# Patient Record
Sex: FEMALE | Race: WHITE | NOT HISPANIC OR LATINO | Employment: UNEMPLOYED | URBAN - METROPOLITAN AREA
[De-identification: names, ages, dates, MRNs, and addresses within clinical notes are randomized per-mention and may not be internally consistent; named-entity substitution may affect disease eponyms.]

---

## 2017-04-11 ENCOUNTER — LAB CONVERSION - ENCOUNTER (OUTPATIENT)
Dept: OTHER | Facility: OTHER | Age: 57
End: 2017-04-11

## 2017-04-11 LAB
CHOLEST SERPL-MCNC: 133 MG/DL (ref 125–200)
CHOLEST/HDLC SERPL: 3.2 (CALC)
HDLC SERPL-MCNC: 42 MG/DL
LDL CHOLESTEROL (HISTORICAL): 64 MG/DL (CALC)
NON-HDL-CHOL (CHOL-HDL) (HISTORICAL): 91 MG/DL (CALC)
TRIGL SERPL-MCNC: 133 MG/DL

## 2017-04-12 ENCOUNTER — ALLSCRIPTS OFFICE VISIT (OUTPATIENT)
Dept: OTHER | Facility: OTHER | Age: 57
End: 2017-04-12

## 2017-05-23 ENCOUNTER — ALLSCRIPTS OFFICE VISIT (OUTPATIENT)
Dept: OTHER | Facility: OTHER | Age: 57
End: 2017-05-23

## 2017-05-23 DIAGNOSIS — I10 ESSENTIAL (PRIMARY) HYPERTENSION: ICD-10-CM

## 2017-05-23 DIAGNOSIS — E78.5 HYPERLIPIDEMIA: ICD-10-CM

## 2017-05-23 DIAGNOSIS — Z86.79 PERSONAL HISTORY OF OTHER DISEASES OF THE CIRCULATORY SYSTEM: ICD-10-CM

## 2017-06-06 ENCOUNTER — ALLSCRIPTS OFFICE VISIT (OUTPATIENT)
Dept: OTHER | Facility: OTHER | Age: 57
End: 2017-06-06

## 2017-06-06 LAB — S PYO AG THROAT QL: POSITIVE

## 2017-06-10 ENCOUNTER — LAB CONVERSION - ENCOUNTER (OUTPATIENT)
Dept: OTHER | Facility: OTHER | Age: 57
End: 2017-06-10

## 2017-06-10 LAB — CULTURE RESULT (HISTORICAL): NORMAL

## 2017-06-12 ENCOUNTER — GENERIC CONVERSION - ENCOUNTER (OUTPATIENT)
Dept: OTHER | Facility: OTHER | Age: 57
End: 2017-06-12

## 2017-06-13 ENCOUNTER — LAB CONVERSION - ENCOUNTER (OUTPATIENT)
Dept: OTHER | Facility: OTHER | Age: 57
End: 2017-06-13

## 2017-10-03 DIAGNOSIS — I25.10 ATHEROSCLEROTIC HEART DISEASE OF NATIVE CORONARY ARTERY WITHOUT ANGINA PECTORIS: ICD-10-CM

## 2017-10-03 DIAGNOSIS — Z12.31 ENCOUNTER FOR SCREENING MAMMOGRAM FOR MALIGNANT NEOPLASM OF BREAST: ICD-10-CM

## 2017-10-03 DIAGNOSIS — E78.5 HYPERLIPIDEMIA: ICD-10-CM

## 2017-10-03 DIAGNOSIS — J44.9 CHRONIC OBSTRUCTIVE PULMONARY DISEASE (HCC): ICD-10-CM

## 2017-10-03 DIAGNOSIS — K82.4 CHOLESTEROLOSIS OF GALLBLADDER: ICD-10-CM

## 2017-10-11 ENCOUNTER — TRANSCRIBE ORDERS (OUTPATIENT)
Dept: ADMINISTRATIVE | Facility: HOSPITAL | Age: 57
End: 2017-10-11

## 2017-10-11 DIAGNOSIS — J44.9 OBSTRUCTIVE CHRONIC BRONCHITIS WITHOUT EXACERBATION (HCC): Primary | ICD-10-CM

## 2017-10-17 ENCOUNTER — HOSPITAL ENCOUNTER (OUTPATIENT)
Dept: RADIOLOGY | Facility: HOSPITAL | Age: 57
Discharge: HOME | End: 2017-10-17
Payer: COMMERCIAL

## 2017-10-17 DIAGNOSIS — J44.9 CHRONIC OBSTRUCTIVE PULMONARY DISEASE (HCC): ICD-10-CM

## 2017-10-17 PROCEDURE — 71250 CT THORAX DX C-: CPT

## 2017-11-01 ENCOUNTER — LAB CONVERSION - ENCOUNTER (OUTPATIENT)
Dept: OTHER | Facility: OTHER | Age: 57
End: 2017-11-01

## 2017-11-01 LAB
A/G RATIO (HISTORICAL): 1.6 (CALC) (ref 1–2.5)
ALBUMIN SERPL BCP-MCNC: 3.9 G/DL (ref 3.6–5.1)
ALP SERPL-CCNC: 142 U/L (ref 33–130)
ALT SERPL W P-5'-P-CCNC: 43 U/L (ref 6–29)
AST SERPL W P-5'-P-CCNC: 31 U/L (ref 10–35)
BASOPHILS # BLD AUTO: 0.7 %
BASOPHILS # BLD AUTO: 51 CELLS/UL (ref 0–200)
BILIRUB SERPL-MCNC: 0.4 MG/DL (ref 0.2–1.2)
BUN SERPL-MCNC: 14 MG/DL (ref 7–25)
BUN/CREA RATIO (HISTORICAL): ABNORMAL (CALC) (ref 6–22)
CALCIUM SERPL-MCNC: 8.8 MG/DL (ref 8.6–10.4)
CHLORIDE SERPL-SCNC: 103 MMOL/L (ref 98–110)
CHOLEST SERPL-MCNC: 163 MG/DL
CHOLEST/HDLC SERPL: 4.2 (CALC)
CO2 SERPL-SCNC: 28 MMOL/L (ref 20–31)
CREAT SERPL-MCNC: 0.92 MG/DL (ref 0.5–1.05)
DEPRECATED RDW RBC AUTO: 12.6 % (ref 11–15)
EGFR AFRICAN AMERICAN (HISTORICAL): 80 ML/MIN/1.73M2
EGFR-AMERICAN CALC (HISTORICAL): 69 ML/MIN/1.73M2
EOSINOPHIL # BLD AUTO: 183 CELLS/UL (ref 15–500)
EOSINOPHIL # BLD AUTO: 2.5 %
GAMMA GLOBULIN (HISTORICAL): 2.5 G/DL (CALC) (ref 1.9–3.7)
GLUCOSE (HISTORICAL): 94 MG/DL (ref 65–99)
HCT VFR BLD AUTO: 46.8 % (ref 35–45)
HDLC SERPL-MCNC: 39 MG/DL
HGB BLD-MCNC: 15.9 G/DL (ref 11.7–15.5)
LDL CHOLESTEROL (HISTORICAL): 97 MG/DL (CALC)
LYMPHOCYTES # BLD AUTO: 1993 CELLS/UL (ref 850–3900)
LYMPHOCYTES # BLD AUTO: 27.3 %
MCH RBC QN AUTO: 30.1 PG (ref 27–33)
MCHC RBC AUTO-ENTMCNC: 34 G/DL (ref 32–36)
MCV RBC AUTO: 88.5 FL (ref 80–100)
MONOCYTES # BLD AUTO: 621 CELLS/UL (ref 200–950)
MONOCYTES (HISTORICAL): 8.5 %
NEUTROPHILS # BLD AUTO: 4453 CELLS/UL (ref 1500–7800)
NEUTROPHILS # BLD AUTO: 61 %
NON-HDL-CHOL (CHOL-HDL) (HISTORICAL): 124 MG/DL (CALC)
PLATELET # BLD AUTO: 177 THOUSAND/UL (ref 140–400)
PMV BLD AUTO: 13.1 FL (ref 7.5–12.5)
POTASSIUM SERPL-SCNC: 4 MMOL/L (ref 3.5–5.3)
RBC # BLD AUTO: 5.29 MILLION/UL (ref 3.8–5.1)
SODIUM SERPL-SCNC: 138 MMOL/L (ref 135–146)
TOTAL PROTEIN (HISTORICAL): 6.4 G/DL (ref 6.1–8.1)
TRIGL SERPL-MCNC: 167 MG/DL
TSH SERPL DL<=0.05 MIU/L-ACNC: 3.09 MIU/L (ref 0.4–4.5)
WBC # BLD AUTO: 7.3 THOUSAND/UL (ref 3.8–10.8)

## 2017-11-02 ENCOUNTER — GENERIC CONVERSION - ENCOUNTER (OUTPATIENT)
Dept: OTHER | Facility: OTHER | Age: 57
End: 2017-11-02

## 2017-11-07 ENCOUNTER — HOSPITAL ENCOUNTER (OUTPATIENT)
Dept: RADIOLOGY | Facility: HOSPITAL | Age: 57
Discharge: HOME | End: 2017-11-07
Payer: COMMERCIAL

## 2017-11-07 DIAGNOSIS — K82.4 CHOLESTEROLOSIS OF GALLBLADDER: ICD-10-CM

## 2017-11-07 PROCEDURE — 76705 ECHO EXAM OF ABDOMEN: CPT

## 2017-11-10 ENCOUNTER — GENERIC CONVERSION - ENCOUNTER (OUTPATIENT)
Dept: OTHER | Facility: OTHER | Age: 57
End: 2017-11-10

## 2017-11-15 ENCOUNTER — HOSPITAL ENCOUNTER (OUTPATIENT)
Dept: RADIOLOGY | Facility: HOSPITAL | Age: 57
Discharge: HOME | End: 2017-11-15
Payer: COMMERCIAL

## 2017-11-15 DIAGNOSIS — Z12.31 ENCOUNTER FOR SCREENING MAMMOGRAM FOR MALIGNANT NEOPLASM OF BREAST: ICD-10-CM

## 2017-11-15 PROCEDURE — G0202 SCR MAMMO BI INCL CAD: HCPCS

## 2017-11-30 ENCOUNTER — GENERIC CONVERSION - ENCOUNTER (OUTPATIENT)
Dept: OTHER | Facility: OTHER | Age: 57
End: 2017-11-30

## 2017-12-06 ENCOUNTER — TRANSCRIBE ORDERS (OUTPATIENT)
Dept: ADMINISTRATIVE | Facility: HOSPITAL | Age: 57
End: 2017-12-06

## 2017-12-06 ENCOUNTER — ALLSCRIPTS OFFICE VISIT (OUTPATIENT)
Dept: OTHER | Facility: OTHER | Age: 57
End: 2017-12-06

## 2017-12-06 DIAGNOSIS — J43.9 EMPHYSEMATOUS BLEB (HCC): Primary | ICD-10-CM

## 2017-12-06 DIAGNOSIS — R91.1 COIN LESION: ICD-10-CM

## 2017-12-12 ENCOUNTER — GENERIC CONVERSION - ENCOUNTER (OUTPATIENT)
Dept: PULMONOLOGY | Facility: HOSPITAL | Age: 57
End: 2017-12-12

## 2017-12-12 ENCOUNTER — HOSPITAL ENCOUNTER (OUTPATIENT)
Dept: PULMONOLOGY | Facility: HOSPITAL | Age: 57
Discharge: HOME | End: 2017-12-12
Attending: INTERNAL MEDICINE
Payer: COMMERCIAL

## 2017-12-12 DIAGNOSIS — J43.9 EMPHYSEMATOUS BLEB (HCC): ICD-10-CM

## 2017-12-12 PROCEDURE — 94760 N-INVAS EAR/PLS OXIMETRY 1: CPT

## 2017-12-12 PROCEDURE — 94726 PLETHYSMOGRAPHY LUNG VOLUMES: CPT

## 2017-12-12 PROCEDURE — 94060 EVALUATION OF WHEEZING: CPT

## 2017-12-12 PROCEDURE — 94729 DIFFUSING CAPACITY: CPT

## 2017-12-12 RX ORDER — ALBUTEROL SULFATE 2.5 MG/3ML
2.5 SOLUTION RESPIRATORY (INHALATION) ONCE
Status: COMPLETED | OUTPATIENT
Start: 2017-12-12 | End: 2017-12-12

## 2017-12-12 RX ADMIN — ALBUTEROL SULFATE 2.5 MG: 2.5 SOLUTION RESPIRATORY (INHALATION) at 10:12

## 2017-12-15 DIAGNOSIS — E78.5 HYPERLIPIDEMIA: ICD-10-CM

## 2018-01-03 ENCOUNTER — GENERIC CONVERSION - ENCOUNTER (OUTPATIENT)
Dept: FAMILY MEDICINE CLINIC | Facility: CLINIC | Age: 58
End: 2018-01-03

## 2018-01-10 NOTE — RESULT NOTES
Discussion/Summary   throat culture was neg     Verified Results  (Q) CULTURE, AEROBIC AND ANAEROBIC W/GRAM STAIN [id number] 12:00AM Rajiv Erich     Test Name Result Flag Reference   CULTURE, AEROBIC BACTERIA      CULTURE, AEROBIC BACTERIA         MICRO NUMBER:      [id number]    TEST STATUS:       FINAL    SPECIMEN SOURCE:   THROAT    SPECIMEN QUALITY:  ADEQUATE    RESULT:            No oropharyngeal pathogens recovered  CULTURE, AEROBIC AND ANAEROBIC W/GRAM STAIN      CULTURE, ANAEROBIC BACTERIA W/GRAM STAIN         MICRO NUMBER:      [id number]    TEST STATUS:       PRELIMINARY    SPECIMEN SOURCE:   THROAT    SPECIMEN QUALITY:  ADEQUATE    GRAM STAIN:        No white blood cells seen                       Moderate Gram negative bacilli    RESULT:            No anaerobes isolated

## 2018-01-12 VITALS
SYSTOLIC BLOOD PRESSURE: 110 MMHG | DIASTOLIC BLOOD PRESSURE: 68 MMHG | HEART RATE: 77 BPM | BODY MASS INDEX: 28.95 KG/M2 | WEIGHT: 157.31 LBS | HEIGHT: 62 IN

## 2018-01-13 VITALS
WEIGHT: 154.13 LBS | BODY MASS INDEX: 28.36 KG/M2 | HEIGHT: 62 IN | HEART RATE: 76 BPM | DIASTOLIC BLOOD PRESSURE: 72 MMHG | SYSTOLIC BLOOD PRESSURE: 130 MMHG | TEMPERATURE: 97.3 F

## 2018-01-15 NOTE — RESULT NOTES
Discussion/Summary   Recent ultrasound showed no significant changes of gallbladder compared to testing from CT scan in 2015  No evidence of any gallstones or gallbladder disease     Verified Results  US RIGHT UPPER QUADRANT [id number] 09:14AM Marene Stairs Order Number: [id number]    - Patient Instructions: To schedule this appointment, please contact Central Scheduling at [phone]  Test Name Result Flag Reference   US RIGHT UPPER QUADRANT (Report)     RIGHT UPPER QUADRANT ULTRASOUND     INDICATION: Right upper quadrant pain  Follow-up gallbladder polyps  COMPARISON: Ultrasound 7/26/2012  CT scan 5/5/2015  TECHNIQUE:  Real-time ultrasound of the right upper quadrant was performed with a curvilinear transducer with both volumetric sweeps and still imaging techniques  FINDINGS:     PANCREAS: Visualized portions of the pancreas are within normal limits  AORTA AND IVC: Visualized portions are normal for patient age  LIVER:   Size: Within normal range  The liver measures 16 cm in the midclavicular line  Contour: Surface contour is smooth  Parenchyma: Echogenicity and echotexture are within normal limits  No evidence of suspicious mass  Limited imaging of the main portal vein shows it to be patent and hepatopetal       BILIARY:   The gallbladder is normal in caliber  No wall thickening or pericholecystic fluid  No stones or sludge identified  There are 2 unchanged tiny polyps measuring 7 mm and 3 mm  No sonographic Pinto's sign  No intrahepatic biliary dilatation  CBD measures 3 mm  No choledocholithiasis  KIDNEY:    Right kidney measures 10 6 cm  Within normal limits  ASCITES:  None  IMPRESSION:     Stable tiny subcentimeter gallbladder polyps         Workstation performed: [id number]     Signed by:   Evgeny Wright MD   11/10/17

## 2018-01-16 NOTE — PROGRESS NOTES
Assessment    1  Chronic obstructive pulmonary disease (496) (J44 9)   2  Vertigo (780 4) (R42)   3  Arteriosclerotic heart disease (414 00) (I25 10)   4  Hypertension (401 9) (I10)   5  DDD (degenerative disc disease), lumbar (722 52) (M51 36)   6  Depression with anxiety (300 4) (F41 8)    Plan  Arteriosclerotic heart disease    · 1 - Leia SPANN, Clara Frances (Cardiology) Co-Management  *  Status: Active  Requested for:  12Apr2017  Care Summary provided  : Yes  Arteriosclerotic heart disease, Chronic obstructive pulmonary disease    · (1) CBC/PLT/DIFF; Status:Active; Requested TGN:[id number];    · (1) COMPREHENSIVE METABOLIC PANEL; Status:Active; Requested for:03Oct2017;    · (1) LIPID PANEL FASTING W DIRECT LDL REFLEX; Status:Active; Requested  HER:[id number]; Arteriosclerotic heart disease, Chronic obstructive pulmonary disease, Hyperlipidemia    · (1) TSH WITH FT4 REFLEX; Status:Active; Requested for:03Oct2017;   Chronic obstructive pulmonary disease    · Nicotine 14 MG/24HR Transdermal Patch 24 Hour; APPLY 1 PATCH DAILY AS  DIRECTED   · Nicotine 21 MG/24HR Transdermal Patch 24 Hour; APPLY 1 PATCH DAILY AS  DIRECTED   · Nicotine 7 MG/24HR Transdermal Patch 24 Hour; APPLY 1 PATCH DAILY AS  DIRECTED   · * CT CHEST WO CONTRAST; Status:Need Information - Financial Authorization; Requested for:12Apr2017;    · Complete PFT; Status:Hold For - Scheduling; Requested for:12Apr2017;   Vertigo    · 3 - Anaid Sales DO  (Otolaryngology) Co-Management  *  Status: Hold For -  Scheduling  Requested for: 12Apr2017  are Referring to a non- Preferred Provider : Quality  Care Summary provided  : Yes    Discussion/Summary    Follow-up chronic medical conditions  1  Coronary artery disease  Hypertension  Hyperlipidemia  Patient feels well, no chest pain or dyspnea on exertion  She is past due cardiology follow-up  I advised her to schedule was [address] [facility]   Blood pressure is well controlled, LDL is at goal, continue Lipitor, Zetia, flaxseed oil capsules  2  COPD  Tobacco use  Patient is motivated to quit  Wellbutrin works well  She is interested in starting nicotinic replacement patches  Prescriptions provided  I strongly advised her to proceed with CT chest and PFTs, as ordered at last office visit  3  Chronic low back pain  Patient uses stretching exercises and Flexeril as needed  4  Chronic vertigo  Recurrent, transient  Prior workup is unremarkable  Patient admits to decreased hearing  I advised to schedule an evaluation with ENT  She is agreeable  5  Health maintenance-patient is past due colonoscopy, I advised her to contact office of Dr Jurado to set up 6  Depression  Anxiety  Well controlled, continue Wellbutrin  Blood work and follow-up in 6 months    continue   t/c wellbutrin to 300 mg if needed  micha - colonosocpy   The patient was counseled regarding instructions for management, risk factor reductions, impressions, risks and benefits of treatment options, importance of compliance with treatment  total time of encounter was 40 minutes and 25 minutes was spent counseling  Possible side effects of new medications were reviewed with the patient/guardian today  The treatment plan was reviewed with the patient/guardian  The patient/guardian understands and agrees with the treatment plan      Chief Complaint  Pt is here for a 4 month f/u appt  Pt offers no complaints  Pt also had recent BW done  All meds/allergies reviewed with pt  History of Present Illness  Follow-up chronic medical conditions  Patient remains on medications for coronary artery disease, hyperlipidemia, COPD, chronic low back pain  Results of recent blood work discussed  Significant improvement of LDL with addition of fatty 3 omega acids  Patient remains on Lipitor anxiety as well  She denies chest pain or palpitations  She still unfortunately smoking, approximately three quarters of a pack daily     Patient has been using Wellbutrin 150 mg daily  Medication works well, patient did notice decreased cravings to smoke, symptoms of depression have improved  NRT worked well in a past , patient used patches with success  She is motivated to quit now  Patient is experiencing recurrent episodes of lightheadedness regardless of position or activity  She had extensive workup including echo, Holter and carotid Dopplers in 2015, all unremarkable   Patient is admitting decreased hearing , but no headaches  Patient uses Flexeril for chronic low back pain  Review of Systems    Constitutional: No fever, no chills, feels well, no tiredness, no recent weight gain or weight loss  Eyes: No complaints of eye pain, no red eyes, no eyesight problems, no discharge, no dry eyes, no itching of eyes  ENT: no complaints of earache, no loss of hearing, no nose bleeds, no nasal discharge, no sore throat, no hoarseness  Cardiovascular: No complaints of slow heart rate, no fast heart rate, no chest pain, no palpitations, no leg claudication, no lower extremity edema  Respiratory: No complaints of shortness of breath, no wheezing, no cough, no SOB on exertion, no orthopnea, no PND  Gastrointestinal: No complaints of abdominal pain, no constipation, no nausea or vomiting, no diarrhea, no bloody stools  Genitourinary: No complaints of dysuria, no incontinence, no pelvic pain, no dysmenorrhea, no vaginal discharge or bleeding  Musculoskeletal: arthralgias, myalgias and lower back pain  Integumentary: No complaints of skin rash or lesions, no itching, no skin wounds, no breast pain or lump  Neurological: dizziness  Psychiatric: Not suicidal, no sleep disturbance, no anxiety or depression, no change in personality, no emotional problems  Endocrine: No complaints of proptosis, no hot flashes, no muscle weakness, no deepening of the voice, no feelings of weakness     Hematologic/Lymphatic: No complaints of swollen glands, no swollen glands in the neck, does not bleed easily, does not bruise easily  Active Problems    1  Abdominal pain (789 00) (R10 9)   2  Acute upper respiratory infection (465 9) (J06 9)   3  Arteriosclerotic heart disease (414 00) (I25 10)   4  Chronic obstructive pulmonary disease (496) (J44 9)   5  DDD (degenerative disc disease), lumbar (722 52) (M51 36)   6  Depression with anxiety (300 4) (F41 8)   7  Encounter for cervical Pap smear with pelvic exam (V76 2,V72 31) (Z01 419)   8  Encounter for screening mammogram for malignant neoplasm of breast (V76 12)   (Z12 31)   9  Esophageal reflux (530 81) (K21 9)   10  Fatigue (780 79) (R53 83)   11  Gallbladder polyp (575 6) (K82 4)   12  History of diverticulitis of colon (V12 79) (Z87 19)   13  Hyperlipidemia (272 4) (E78 5)   14  Hypertension (401 9) (I10)   15  Iliotibial band tendonitis (728 89) (M76 30)   16  Impaired fasting glucose (790 21) (R73 01)   17  Leg weakness, bilateral (729 89) (R29 898)   18  Low back pain (724 2) (M54 5)   19  Lower abdominal pain (789 09) (R10 30)   20  Myofascial pain syndrome, cervical (729 1) (M79 1)   21  Neck pain (723 1) (M54 2)   22  Need for influenza vaccination (V04 81) (Z23)   23  Osteopenia (733 90) (M85 80)   24  Piriformis syndrome of right side (355 0) (G57 01)   25  Sciatica (724 3) (M54 30)   26  Syncope, near (780 2) (R55)   27  Upper respiratory infection (465 9) (J06 9)   28  Vitamin D deficiency (268 9) (E55 9)    Past Medical History    1  History of chest pain (V13 89) (Z87 898)   2  History of constipation (V12 79) (Z87 19)   3  History of diverticulitis of colon (V12 79) (Z87 19)   4  History of hypercholesterolemia (V12 29) (Z86 39)   5  History of migraine (V12 49) (Z86 69)   6  History of pulmonary emphysema (V12 69) (Z87 09)    The active problems and past medical history were reviewed and updated today  Surgical History    1  History of Complete Colonoscopy   2   History of Diagnostic Esophagogastroduodenoscopy   3  History of Shoulder Surgery   4  History of Total Abdominal Hysterectomy With Removal Of Both Ovaries    The surgical history was reviewed and updated today  Family History  Father    1  Family history of Dyslipidemia   2  Family history of Lung Cancer (V16 1)  Sister    3  Family history of Diabetes Mellitus (V18 0)  Family History    4  Family history of hypertension (V17 49) (Z82 49)    The family history was reviewed and updated today  Social History    · Current every day smoker (305 1) (F17 200)   · Marital History - Currently    · No alcohol use   · No drug use   · Working Full Time  The social history was reviewed and updated today  Current Meds   1  Advil TABS; Therapy: (Recorded:50Njw2870) to Recorded   2  Albuterol Sulfate (2 5 MG/3ML) 0 083% Inhalation Nebulization Solution; USE ONE VIAL   IN NEBULIZER EVERY 4 TO 6 HOURS AS NEEDED FOR  COUGH    ANDWHEEZE;   Therapy: 89KYI3864 to (Evaluate:54Ohg3439)  Requested for: 47FYK9194; Last   Rx:10Nov2014 Ordered   3  Anoro Ellipta 62 5-25 MCG/INH Inhalation Aerosol Powder Breath Activated; USE ONE (1)   PUFFS ONCE DAILY; Therapy: 49MIR1402 to (Evaluate:12Jun2017)  Requested for: 11UYC6593; Last   Rx:13Cyo4260 Ordered   4  Aspirin 81 MG TABS; Therapy: (Recorded:71Orv1095) to Recorded   5  Atorvastatin Calcium 40 MG Oral Tablet; take 1 tablet every day; Therapy: 91IGZ6851 to (Evaluate:52Okd0343)  Requested for: 01TDF9104; Last   Rx:31Mar2017 Ordered   6  BuPROPion HCl ER (XL) 150 MG Oral Tablet Extended Release 24 Hour; take 1 tablet   every day; Therapy: 76TNR9324 to (Evaluate:34Gqo8315)  Requested for: 76FRK3362; Last   Rx:23Nop9976 Ordered   7  Cyclobenzaprine HCl - 10 MG Oral Tablet; TAKE 1 TABLET Bedtime PRN muscle   spasms; Therapy: 94DZK1824 to (Evaluate:09Rpx6155)  Requested for: 11BWN9630; Last   Rx:07Mar2016 Ordered   8   Ecotrin Low Strength 81 MG Oral Tablet Delayed Release; TAKE 1 TABLET DAILY; Therapy: (Recorded:37Xwc6264) to Recorded   9  Ezetimibe 10 MG Oral Tablet; take 1 tablet every day; Therapy: 86Yer8963 to (Evaluate:17Mar2018)  Requested for: 22Mar2017; Last   Rx:22Mar2017 Ordered   10  Nitrostat 0 4 MG Sublingual Tablet Sublingual; PLACE 1 TABLET UNDER THE TONGUE    EVERY 5 MINUTES UP TO 3 DOSES AS NEEDED FOR CHEST PAIN     Requested for: 17Aug2016; Last Rx:17Aug2016 Ordered   11  ProAir RespiClick 676 (90 Base) MCG/ACT Inhalation Aerosol Powder Breath Activated;    INHALE 2 PUFFS Every 4 hours PRN Cough/wheeze; Therapy: 33IYC8314 to (Last Rx:17Aug2016) Ordered   12  Verapamil HCl  MG Oral Tablet Extended Release; Take 1 tablet daily; Therapy: 02QCM1947 to (Evaluate:55Cjz3349)  Requested for: 01HZA9534; Last    Rx:31Mar2017 Ordered    The medication list was reviewed and updated today  Allergies    1  Darvocet A500 TABS    Vitals  Vital Signs    Recorded: 12Apr2017 08:43AM   Temperature 97 7 F   Heart Rate 80   Systolic 505   Diastolic 72   Height 5 ft 2 in   Weight 157 lb    BMI Calculated 28 72   BSA Calculated 1 73     Physical Exam    Constitutional   General appearance: No acute distress, well appearing and well nourished  Pulmonary   Respiratory effort: No increased work of breathing or signs of respiratory distress  Auscultation of lungs: Clear to auscultation  Cardiovascular   Auscultation of heart: Normal rate and rhythm, normal S1 and S2, without murmurs  Examination of extremities for edema and/or varicosities: Normal     Carotid pulses: Normal     Musculoskeletal   Gait and station: Normal     Neurologic   Cranial nerves: Cranial nerves 2-12 intact  Psychiatric   Orientation to person, place, and time: Normal     Mood and affect: Normal          Health Management  Encounter for cervical Pap smear with pelvic exam   (every) THINPREP PAP; every 1 year; Last 81Zri5331; Next Due: 05Sep2007;  Overdue  Health Maintenance   COLONOSCOPY; every 5 years; Last 31WCD1088; Next Due: 01Jun2016; Overdue    Future Appointments    Date/Time Provider Specialty Site   10/11/2017 08:30 AM JOSE Hurst  [address]   05/23/2017 01:40 PM Sanjeev Mora MD Cardiology [address]     Signatures   Electronically signed by :  JOSE Coelho ; Apr 15 2017  8:42AM EST                       (Author)

## 2018-01-18 ENCOUNTER — ALLSCRIPTS OFFICE VISIT (OUTPATIENT)
Dept: OTHER | Facility: OTHER | Age: 58
End: 2018-01-18

## 2018-01-18 DIAGNOSIS — I25.10 ATHEROSCLEROTIC HEART DISEASE OF NATIVE CORONARY ARTERY WITHOUT ANGINA PECTORIS: ICD-10-CM

## 2018-01-19 ENCOUNTER — GENERIC CONVERSION - ENCOUNTER (OUTPATIENT)
Dept: OTHER | Facility: OTHER | Age: 58
End: 2018-01-19

## 2018-01-19 LAB
A/G RATIO (HISTORICAL): 1.7 (CALC) (ref 1–2.5)
ALBUMIN SERPL BCP-MCNC: 4.4 G/DL (ref 3.6–5.1)
ALP SERPL-CCNC: 116 U/L (ref 33–130)
ALT SERPL W P-5'-P-CCNC: 19 U/L (ref 6–29)
AST SERPL W P-5'-P-CCNC: 18 U/L (ref 10–35)
BILIRUB SERPL-MCNC: 0.4 MG/DL (ref 0.2–1.2)
BUN SERPL-MCNC: 13 MG/DL (ref 7–25)
BUN/CREA RATIO (HISTORICAL): NORMAL (CALC) (ref 6–22)
CALCIUM SERPL-MCNC: 9.3 MG/DL (ref 8.6–10.4)
CHLORIDE SERPL-SCNC: 106 MMOL/L (ref 98–110)
CO2 SERPL-SCNC: 24 MMOL/L (ref 20–31)
CREAT SERPL-MCNC: 0.81 MG/DL (ref 0.5–1.05)
EGFR AFRICAN AMERICAN (HISTORICAL): 93 ML/MIN/1.73M2
EGFR-AMERICAN CALC (HISTORICAL): 80 ML/MIN/1.73M2
GAMMA GLOBULIN (HISTORICAL): 2.6 G/DL (CALC) (ref 1.9–3.7)
GLUCOSE (HISTORICAL): 80 MG/DL (ref 65–99)
POTASSIUM SERPL-SCNC: 3.8 MMOL/L (ref 3.5–5.3)
SODIUM SERPL-SCNC: 140 MMOL/L (ref 135–146)
TOTAL PROTEIN (HISTORICAL): 7 G/DL (ref 6.1–8.1)

## 2018-01-20 NOTE — PROGRESS NOTES
Assessment   1  History of Complete Colonoscopy   · 6/2011- Dr Jurado - due in 5 years        COLONOSCOPY NOVEMBER 2017, DIVERTICULOSIS, 5 mm polyp; DUE IN 5 YEARS,        Dr Jewel Daniels   2  Arteriosclerotic heart disease (414 00) (I25 10)   3  Pulmonary emphysema, unspecified emphysema type (492 8) (J43 9)   4  Hyperlipidemia (272 4) (E78 5)   5  Hypertension (401 9) (I10)   6  Esophageal reflux (530 81) (K21 9)   · EGD 11/2017-ectopic gastric mucosa and upper esophagus, no other abnormalities;        Carlos Grant    Discussion/Summary      Patient presents for follow-up of chronic medical conditions  She has been feeling better overall  controlled, continue same medication regimen, verapamil 240 mg daily  Lipitor and Zetia  Will check CMP today  artery disease-patient is asymptomatic, she remains under care of [facility], she is on baby aspirin daily  Emphysema  She was evaluated by [facility]ology and will be following up with CT chest in April of 2018  She did successfully quit tobacco and I commended her on this significant lifestyle change  She remains on Wellbutrin  mg daily which is helping with cravings  reflux disease  Her symptoms have improved, she had extensive GI workup including right upper quadrant ultrasound revealing stable tiny gallbladder polyps, EGD and colonoscopy  Will continue Pepcid 40 mg daily  4-5 months  The patient was counseled regarding diagnostic results,-- instructions for management,-- patient and family education,-- impressions,-- risks and benefits of treatment options,-- importance of compliance with treatment  total time of encounter was 25 minutes-- and-- 15 minutes was spent counseling  Possible side effects of new medications were reviewed with the patient/guardian today  The treatment plan was reviewed with the patient/guardian   The patient/guardian understands and agrees with the treatment plan      Chief Complaint   Patient is here for a followup  All medications were reviewed and updated with the patient  History of Present Illness   Patient presents for follow-up of chronic medical conditions has successfully quit tobacco a month ago WORK UP     US RUQ : GB polyp, sub cm; ,tiny    GI follow-up on consult-patient had EGD and colonoscopy - small 5 mm: polyp    no other abnormal findings   Patient uses Pepcid 40 mg daily, symptoms of acid reflux disease and abdominal discomfort have improved significantly, no concerns at present time  was seen by pulmonary -follow-up CT chest will be done in 4/2018 and will follow up with DR Helder Mejiaaw   feels better after quitting smoking   improved energy and decreased symptoms of cough and dyspnea has discontinued on or as it reportedly has caused myalgias and spasms  She is currently using Spiriva 2 puffs daily and tolerates this medication better  Patient remains on medications for hypertension, hyperlipidemia as directed  Wellbutrin  mg daily works well and did help with symptoms of cravings  Patient prefers to stay on the same dose of medication as it works well  Review of Systems        Constitutional: No fever, no chills, feels well, no tiredness, no recent weight gain or weight loss  Eyes: No complaints of eye pain, no red eyes, no eyesight problems, no discharge, no dry eyes, no itching of eyes  ENT: no complaints of earache, no loss of hearing, no nose bleeds, no nasal discharge, no sore throat, no hoarseness  Cardiovascular: No complaints of slow heart rate, no fast heart rate, no chest pain, no palpitations, no leg claudication, no lower extremity edema  Respiratory: No complaints of shortness of breath, no wheezing, no cough, no SOB on exertion, no orthopnea, no PND  Gastrointestinal: No complaints of abdominal pain, no constipation, no nausea or vomiting, no diarrhea, no bloody stools        Genitourinary: No complaints of dysuria, no incontinence, no pelvic pain, no dysmenorrhea, no vaginal discharge or bleeding  Musculoskeletal: as noted in HPI  Integumentary: No complaints of skin rash or lesions, no itching, no skin wounds, no breast pain or lump  Neurological: No complaints of headache, no confusion, no convulsions, no numbness, no dizziness or fainting, no tingling, no limb weakness, no difficulty walking  Psychiatric: Not suicidal, no sleep disturbance, no anxiety or depression, no change in personality, no emotional problems  Endocrine: No complaints of proptosis, no hot flashes, no muscle weakness, no deepening of the voice, no feelings of weakness  Hematologic/Lymphatic: No complaints of swollen glands, no swollen glands in the neck, does not bleed easily, does not bruise easily  Active Problems   1  Abdominal pain (789 00) (R10 9)   2  Arteriosclerotic heart disease (414 00) (I25 10)   3  DDD (degenerative disc disease), lumbar (722 52) (M51 36)   4  Depression with anxiety (300 4) (F41 8)   5  Diverticulosis of colon (562 10) (K57 30)   6  Esophageal reflux (530 81) (K21 9)   7  Gallbladder polyp (575 6) (K82 4)   8  History of Prinzmetal angina (V12 59) (Z86 79)   9  Hyperlipidemia (272 4) (E78 5)   10  Hypertension (401 9) (I10)   11  Low back pain (724 2) (M54 5)   12  Myofascial pain syndrome, cervical (729 1) (M79 1)   13  Osteopenia (733 90) (M85 80)   14  Pulmonary emphysema, unspecified emphysema type (492 8) (J43 9)   15  Solitary pulmonary nodule (793 11) (R91 1)   16  Vitamin D deficiency (268 9) (E55 9)    Past Medical History   1  History of constipation (V12 79) (Z87 19)   2  History of hypercholesterolemia (V12 29) (Z86 39)   3  History of migraine (V12 49) (Z86 69)   4  History of Prinzmetal angina (V12 59) (Z86 79)   5  History of sciatica (V12 49) (Z86 69)   6  History of vertigo (V12 49) (Z87 898)   7   History of Iliotibial band tendonitis (728 89) (M76 30)     The active problems and past medical history were reviewed and updated today  Surgical History   1  History of Complete Colonoscopy   2  History of Diagnostic Esophagogastroduodenoscopy   3  History of Flexor Tendon Repair (Each)   4  History of Shoulder Surgery   5  History of Total Abdominal Hysterectomy With Removal Of Both Ovaries     The surgical history was reviewed and updated today  Family History   Mother    1  Family history of lung cancer (V16 1) (Z80 1)  Father    2  Family history of Dyslipidemia  Sister    3  Family history of Diabetes Mellitus (V18 0)  Family History    4  Family history of hypertension (V17 49) (Z82 49)     The family history was reviewed and updated today  Social History    · Current every day smoker (305 1) (F17 200)   · Marital History - Currently    · No alcohol use   · No drug use   · Working Full Time  The social history was reviewed and updated today  Current Meds    1  Albuterol Sulfate (2 5 MG/3ML) 0 083% Inhalation Nebulization Solution; USE ONE VIAL     IN NEBULIZER EVERY 4 TO 6 HOURS AS NEEDED FOR  COUGH      ANDWHEEZE;     Therapy: 26UTV3847 to (Evaluate:39Isd4671)  Requested for: 86JQM5720; Last     Rx:10Nov2014 Ordered   2  BuPROPion HCl ER (XL) 150 MG Oral Tablet Extended Release 24 Hour; take 1 tablet     every day; Therapy: 82IYD6847 to (22 131634)  Requested for: 41PDU8299; Last     Rx:02Nov2017 Ordered   3  Cyclobenzaprine HCl - 10 MG Oral Tablet; TAKE 1 TABLET Bedtime PRN muscle     spasms; Therapy: 63YUH7489 to (Cecil Wong)  Requested for: 46PRW7477; Last     Rx:02Nov2017 Ordered   4  Ecotrin Low Strength 81 MG Oral Tablet Delayed Release; TAKE 1 TABLET DAILY; Therapy: (Recorded:18Apr2015) to Recorded   5  Ezetimibe 10 MG Oral Tablet; take 1 tablet every day; Therapy: 62Eov0880 to (22 131634)  Requested for: 21WPV9979; Last     Rx:02Nov2017 Ordered   6  Famotidine 40 MG Oral Tablet; TAKE 1 TABLET AT BEDTIME;      Therapy: 74KJG8688 to (Vaishnavi Madrid)  Requested for: 42NCJ6512; Last     Rx:02Nov2017 Ordered   7  Flax Seed Oil CAPS; Therapy: (Recorded:84Jeo8103) to Recorded   8  Nitrostat 0 4 MG Sublingual Tablet Sublingual; PLACE 1 TABLET UNDER THE TONGUE     EVERY 5 MINUTES UP TO 3 DOSES AS NEEDED FOR CHEST PAIN      Requested for: 17Aug2016; Last Rx:17Aug2016 Ordered   9  ProAir RespiClick 372 (90 Base) MCG/ACT Inhalation Aerosol Powder Breath Activated;     INHALE 2 PUFFS Every 4 hours PRN Cough/wheeze; Therapy: 40DJB0391 to (Last Rx:17Aug2016) Ordered   10  Rosuvastatin Calcium 20 MG Oral Tablet; Take 1 tablet daily; Therapy: 18FUC8367 to (Evaluate:28Oct2018)  Requested for: 08BWM7094; Last      Rx:02Nov2017 Ordered   11  Spiriva Respimat 2 5 MCG/ACT Inhalation Aerosol Solution; INHALE 2 PUFFS ONCE      DAILY; Therapy: 78PZP2298 to (Last Rx:70Ndx7790)  Requested for: 26QUT2559 Ordered   12  Verapamil HCl  MG Oral Tablet Extended Release; Take 1 tablet daily; Therapy: 68BTI8257 to (03 17 74 30 53)  Requested for: 45ROQ0298; Last      Rx:02Nov2017 Ordered   13  Vitamin D3 2000 UNIT Oral Capsule; Therapy: (Recorded:45Pxx9847) to Recorded     The medication list was reviewed and updated today  Allergies   1  Darvocet A500 TABS    Vitals   Vital Signs    Recorded: 38PMO8488 09:30AM   Temperature 96 8 F   Heart Rate 72   Respiration 16   Systolic 596   Diastolic 74   Height 5 ft 2 in   Weight 160 lb 2 oz   BMI Calculated 29 29   BSA Calculated 1 74     Physical Exam        Constitutional      General appearance: No acute distress, well appearing and well nourished  Pulmonary      Respiratory effort: No increased work of breathing or signs of respiratory distress  Auscultation of lungs: Clear to auscultation  Cardiovascular      Auscultation of heart: Normal rate and rhythm, normal S1 and S2, without murmurs         Examination of extremities for edema and/or varicosities: Normal  Carotid pulses: Normal        Musculoskeletal      Gait and station: Normal        Neurologic      Cranial nerves: Cranial nerves 2-12 intact  Psychiatric      Orientation to person, place, and time: Normal        Mood and affect: Normal           Results/Data   * MAMMO SCREENING BILATERAL W CAD 81DDK1108 01:00PM Yann Bautista Order Number: [id number]       - Patient Instructions: To schedule this appointment, please contact Central Scheduling at [phone]  Do not wear any perfume, powder, lotion or deodorant on breast or underarm area  Please bring your doctors order, referral (if needed) and insurance information with you on the day of the test  Failure to bring this information may result in this test being rescheduled  Arrive 15 minutes prior to your appointment time to register  On the day of your test, please bring any prior mammogram or breast studies with you that were not performed at a [facility]  Failure to bring prior exams may result in your test needing to be rescheduled  Test Name Result Flag Reference   MAMMO SCREENING BILATERAL W CAD (Report)     Patient History:      Patient is postmenopausal       Family history of breast cancer at age 48 in maternal       grandmother  Took estrogen beginning at age 33  Patient is an every day smoker  Patient's BMI is 26 9  Reason for exam: screening, asymptomatic  Screening           Mammo Screening Bilateral W CAD: November 15, 2017 - Check In #:       [de-identified]      Bilateral MLO and CC view(s) were taken  XCCL view(s) were taken      of the right breast            Technologist: RT Leslie(ANIBAL)(M)      Prior study comparison: August 9, 2016, mammo screening bilateral      W CAD performed at [address] [place] [address]  April 23, 2015, bilateral digital screening mammogram performed at [address]            There are scattered fibroglandular densities  Bilateral digital       mammography is performed  No dominant soft tissue mass,       architectural distortion or suspicious calcifications are noted  The skin and nipple contours are within normal limits  Bilateral nodules are unchanged  No evidence of malignancy  No       significant changes when compared to prior studies  1  No evidence of malignancy  2  No significant change when compared with the prior study  ACR BI-RADSï¾® Assessments: BiRad:2 - Benign           Recommendation:      Routine screening mammogram of both breasts in 1 year  Analyzed by CAD           The patient is scheduled in a reminder system for screening       mammography  8-10% of cancers will be missed on mammography  Management of a       palpable abnormality must be based on clinical grounds  Patients      will be notified of their results via letter from our facility  Accredited by [employer] of Radiology and FDA  Transcription Location: [address] [place] [address]: [id number]           Risk Value(s):      Tyrer-Cuzick 10 Year: 1 800%, Tyrer-Cuzick Lifetime: 4 900%,       Myriad Table: 1 5%, SARAH 5 Year: 0 9%, NCI Lifetime: 5 1%      Signed by:      Bharath Sawant MD      11/16/17      US RIGHT UPPER QUADRANT 82IJN6262 09:14AM Demetrio Moran Order Number: [id number]       - Patient Instructions: To schedule this appointment, please contact Central Scheduling at [phone]  Test Name Result Flag Reference   US RIGHT UPPER QUADRANT (Report)     RIGHT UPPER QUADRANT ULTRASOUND           INDICATION: Right upper quadrant pain  Follow-up gallbladder polyps  COMPARISON: Ultrasound 7/26/2012  CT scan 5/5/2015  TECHNIQUE:  Real-time ultrasound of the right upper quadrant was performed with a curvilinear transducer with both volumetric sweeps and still imaging techniques             FINDINGS:           PANCREAS: Visualized portions of the pancreas are within normal limits  AORTA AND IVC: Visualized portions are normal for patient age  LIVER:      Size: Within normal range  The liver measures 16 cm in the midclavicular line  Contour: Surface contour is smooth  Parenchyma: Echogenicity and echotexture are within normal limits  No evidence of suspicious mass  Limited imaging of the main portal vein shows it to be patent and hepatopetal             BILIARY:      The gallbladder is normal in caliber  No wall thickening or pericholecystic fluid  No stones or sludge identified  There are 2 unchanged tiny polyps measuring 7 mm and 3 mm  No sonographic Pinto's sign  No intrahepatic biliary dilatation  CBD measures 3 mm  No choledocholithiasis  KIDNEY:       Right kidney measures 10 6 cm  Within normal limits  ASCITES:  None  IMPRESSION:           Stable tiny subcentimeter gallbladder polyps  Workstation performed: [id number]           Signed by:      Magi Guadalupe MD      11/10/17      Health Management   History of Encounter for cervical Pap smear with pelvic exam   (every) THINPREP PAP; every 1 year; Last 13Zqt4229; Next Due: 99Bvx2182; Overdue  Health Maintenance   COLONOSCOPY; every 5 years; Last 99XRR8938; Next Due: 45AJE9328; Active    Future Appointments      Date/Time Provider Specialty Site   04/19/2018 10:40 AM Brenda Benitez DO Pulmonary Medicine [facility] Marck Watters     Signatures    Electronically signed by :  JOSE Myrick ; Jan 19 2018  8:56PM EST                       (Author)

## 2018-01-22 VITALS
BODY MASS INDEX: 28.89 KG/M2 | TEMPERATURE: 97.7 F | HEART RATE: 80 BPM | WEIGHT: 157 LBS | DIASTOLIC BLOOD PRESSURE: 72 MMHG | SYSTOLIC BLOOD PRESSURE: 124 MMHG | HEIGHT: 62 IN

## 2018-01-22 VITALS
HEIGHT: 62 IN | TEMPERATURE: 97.6 F | HEART RATE: 72 BPM | DIASTOLIC BLOOD PRESSURE: 70 MMHG | SYSTOLIC BLOOD PRESSURE: 112 MMHG | RESPIRATION RATE: 16 BRPM | WEIGHT: 152 LBS | BODY MASS INDEX: 27.97 KG/M2

## 2018-01-23 VITALS
RESPIRATION RATE: 16 BRPM | HEART RATE: 78 BPM | TEMPERATURE: 97.2 F | DIASTOLIC BLOOD PRESSURE: 88 MMHG | HEIGHT: 62 IN | OXYGEN SATURATION: 92 % | BODY MASS INDEX: 27.97 KG/M2 | WEIGHT: 152 LBS | SYSTOLIC BLOOD PRESSURE: 120 MMHG

## 2018-01-23 VITALS
SYSTOLIC BLOOD PRESSURE: 122 MMHG | WEIGHT: 160.13 LBS | DIASTOLIC BLOOD PRESSURE: 74 MMHG | HEIGHT: 62 IN | BODY MASS INDEX: 29.47 KG/M2 | TEMPERATURE: 96.8 F | HEART RATE: 72 BPM | RESPIRATION RATE: 16 BRPM

## 2018-01-23 NOTE — RESULT NOTES
Verified Results  (1) COMPREHENSIVE METABOLIC PANEL 29BGA5436 17:52WU Maximo Tidwell   REPORT COMMENT:  FASTING:YES     Test Name Result Flag Reference   GLUCOSE 80 mg/dL  65-99   Fasting reference interval   UREA NITROGEN (BUN) 13 mg/dL  7-25   CREATININE 0 81 mg/dL  0 50-1 05   For patients >52years of age, the reference limit  for Creatinine is approximately 13% higher for people  identified as -American  eGFR NON-AFR  AMERICAN 80 mL/min/1 73m2  > OR = 60   eGFR AFRICAN AMERICAN 93 mL/min/1 73m2  > OR = 60   BUN/CREATININE RATIO   3-82   NOT APPLICABLE (calc)   SODIUM 140 mmol/L  135-146   POTASSIUM 3 8 mmol/L  3 5-5 3   CHLORIDE 106 mmol/L     CARBON DIOXIDE 24 mmol/L  20-31   CALCIUM 9 3 mg/dL  8 6-10 4   PROTEIN, TOTAL 7 0 g/dL  6 1-8 1   ALBUMIN 4 4 g/dL  3 6-5 1   GLOBULIN 2 6 g/dL (calc)  1 9-3 7   ALBUMIN/GLOBULIN RATIO 1 7 (calc)  1 0-2 5   BILIRUBIN, TOTAL 0 4 mg/dL  0 2-1 2   ALKALINE PHOSPHATASE 116 U/L     AST 18 U/L  10-35   ALT 19 U/L  6-29       Signatures   Electronically signed by :  JOSE Hebert ; Jan 19 2018  1:47PM EST                       (Author)

## 2018-04-04 ENCOUNTER — HOSPITAL ENCOUNTER (OUTPATIENT)
Dept: RADIOLOGY | Facility: HOSPITAL | Age: 58
Discharge: HOME | End: 2018-04-04
Attending: INTERNAL MEDICINE
Payer: COMMERCIAL

## 2018-04-04 DIAGNOSIS — R91.1 COIN LESION: ICD-10-CM

## 2018-04-04 PROCEDURE — 71250 CT THORAX DX C-: CPT

## 2018-04-18 RX ORDER — ROSUVASTATIN CALCIUM 20 MG/1
1 TABLET, COATED ORAL DAILY
COMMUNITY
Start: 2017-11-02 | End: 2018-10-14 | Stop reason: SDUPTHER

## 2018-04-18 RX ORDER — VERAPAMIL HYDROCHLORIDE 240 MG/1
1 TABLET, FILM COATED, EXTENDED RELEASE ORAL DAILY
COMMUNITY
Start: 2011-12-23 | End: 2018-11-16 | Stop reason: SDUPTHER

## 2018-04-18 RX ORDER — EZETIMIBE 10 MG/1
1 TABLET ORAL DAILY
COMMUNITY
Start: 2013-09-19 | End: 2018-11-16 | Stop reason: SDUPTHER

## 2018-04-18 RX ORDER — ASPIRIN 81 MG/1
1 TABLET ORAL DAILY
COMMUNITY

## 2018-04-18 RX ORDER — FAMOTIDINE 40 MG/1
1 TABLET, FILM COATED ORAL
COMMUNITY
Start: 2017-11-02 | End: 2021-07-15 | Stop reason: ALTCHOICE

## 2018-04-18 RX ORDER — BUPROPION HYDROCHLORIDE 150 MG/1
1 TABLET ORAL DAILY
COMMUNITY
Start: 2016-08-17 | End: 2018-11-16 | Stop reason: SDUPTHER

## 2018-04-18 RX ORDER — ALBUTEROL SULFATE 2.5 MG/3ML
1 SOLUTION RESPIRATORY (INHALATION)
COMMUNITY
Start: 2014-11-10 | End: 2018-06-27

## 2018-04-18 RX ORDER — ACETAMINOPHEN 160 MG
TABLET,DISINTEGRATING ORAL
COMMUNITY
End: 2018-06-27

## 2018-04-18 RX ORDER — PERPHENAZINE/AMITRIPTYLINE HCL 2 MG-25 MG
TABLET ORAL
COMMUNITY
End: 2019-05-14

## 2018-04-18 RX ORDER — CYCLOBENZAPRINE HCL 10 MG
1 TABLET ORAL
COMMUNITY
Start: 2013-01-24 | End: 2019-03-04 | Stop reason: SDUPTHER

## 2018-04-18 RX ORDER — NITROGLYCERIN 0.4 MG/1
1 TABLET SUBLINGUAL
COMMUNITY
End: 2020-10-26 | Stop reason: SDUPTHER

## 2018-04-19 ENCOUNTER — OFFICE VISIT (OUTPATIENT)
Dept: PULMONOLOGY | Facility: CLINIC | Age: 58
End: 2018-04-19
Payer: COMMERCIAL

## 2018-04-19 VITALS
DIASTOLIC BLOOD PRESSURE: 70 MMHG | HEART RATE: 76 BPM | SYSTOLIC BLOOD PRESSURE: 118 MMHG | RESPIRATION RATE: 16 BRPM | TEMPERATURE: 97.6 F | HEIGHT: 62 IN | OXYGEN SATURATION: 94 % | WEIGHT: 148 LBS | BODY MASS INDEX: 27.23 KG/M2

## 2018-04-19 DIAGNOSIS — J44.9 MODERATE COPD (CHRONIC OBSTRUCTIVE PULMONARY DISEASE) (HCC): Primary | ICD-10-CM

## 2018-04-19 DIAGNOSIS — R91.1 SOLITARY PULMONARY NODULE: ICD-10-CM

## 2018-04-19 DIAGNOSIS — Z72.0 TOBACCO ABUSE: ICD-10-CM

## 2018-04-19 PROCEDURE — 99214 OFFICE O/P EST MOD 30 MIN: CPT | Performed by: INTERNAL MEDICINE

## 2018-04-19 NOTE — ASSESSMENT & PLAN NOTE
Repeat CT chest shows stable 8 mm left upper lobe nodule  This is likely a hamartoma  However due to her extensive smoking history she will need a repeat CT in 12 months

## 2018-04-19 NOTE — ASSESSMENT & PLAN NOTE
She clearly needs to stop smoking  Unfortunately she is intolerant to Chantix  She will consider restarting the patches  Her  is agreeable to smoke outside

## 2018-04-19 NOTE — PROGRESS NOTES
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)  Overall the patient appears to be doing well  She will remain on Spiriva 2 puffs once daily  I did ask her to call her insurance company as it is telling me that this medication is covered however her pharmacy is telling her it will cost 400 dollars a month  She should continue to use ProAir/albuterol nebulizer as needed  Most importantly she needs to stop smoking  She is intolerant to Chantix  She is agreeable to enroll in pulmonary rehabilitation  She is up-to-date on her immunizations  She does not require supplemental oxygen  Solitary pulmonary nodule  Repeat CT chest shows stable 8 mm left upper lobe nodule  This is likely a hamartoma  However due to her extensive smoking history she will need a repeat CT in 12 months  Tobacco abuse  She clearly needs to stop smoking  Unfortunately she is intolerant to Chantix  She will consider restarting the patches  Her  is agreeable to smoke outside  Plan:    Diagnoses and all orders for this visit:    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  ([facility] [address] )  -     Ambulatory Referral to Pulmonary Rehabilitation; Future    Solitary pulmonary nodule  -     CT chest without contrast; Future    Tobacco abuse    Other orders  -     albuterol (2 5 mg/3 mL) 0 083 % nebulizer solution; Inhale 1 vial  -     Discontinue: Umeclidinium-Vilanterol (ANORO ELLIPTA) 62 5-25 MCG/INH AEPB; Inhale  -     buPROPion (WELLBUTRIN XL) 150 mg 24 hr tablet; Take 1 tablet by mouth daily  -     cyclobenzaprine (FLEXERIL) 10 mg tablet; Take 1 tablet by mouth  -     aspirin (ECOTRIN LOW STRENGTH) 81 mg EC tablet; Take 1 tablet by mouth daily  -     ezetimibe (ZETIA) 10 mg tablet; Take 1 tablet by mouth daily  -     famotidine (PEPCID) 40 MG tablet; Take 1 tablet by mouth  -     Flaxseed, Linseed, (FLAX SEED OIL) 1300 MG CAPS;  Take by mouth  -     nitroglycerin (NITROSTAT) 0 4 mg SL tablet; Place 1 tablet under the tongue every 5 (five) minutes as needed  -     Albuterol Sulfate (PROAIR RESPICLICK) 900 (90 Base) MCG/ACT AEPB; Inhale 2 puffs every 4 (four) hours as needed  -     Tiotropium Bromide Monohydrate (SPIRIVA RESPIMAT) 2 5 MCG/ACT AERS; Inhale 2 puffs daily  -     rosuvastatin (CRESTOR) 20 MG tablet; Take 1 tablet by mouth daily  -     verapamil (CALAN-SR) 240 mg CR tablet; Take 1 tablet by mouth daily  -     Cholecalciferol (VITAMIN D3) 2000 units capsule; Take by mouth        Follow-up:   6 months      HPI:  She is using spiriva respimat 2 puffs daily - seems to help her  She has a nebulizer - using about 1/month  She rarely uses the [employer]  She was in [place] in March and got a URI that required antibiotics, steroids and nebulizer  She kita't complete recovered but 90% better    She is not getting short of breath unless a couple flights of staris  Some coughing, brings up clear phlegm  Some wheezing  No chest pain  She quit smoking for 1 month but then she went back to smoking Olvin   She is now smoking 1 pack per day  Review of Systems   Constitutional: Negative for unexpected weight change  HENT: Negative for congestion  Respiratory: Positive for shortness of breath  Cardiovascular: Negative for chest pain and leg swelling  Musculoskeletal: Negative for gait problem         The following portions of the patient's history were reviewed and updated as appropriate: allergies, current medications, past family history, past medical history, past social history, past surgical history and problem list     VITALS:  Vitals:    04/19/18 1102   BP: 118/70   Pulse: 76   Resp: 16   Temp: 97 6 °F (36 4 °C)   SpO2: 94%   Weight: 67 1 kg (148 lb)   Height: 5' 2" (1 575 m)       Physical Exam  General:  Patient is awake, alert, non-toxic and in no acute respiratory distress  Neck: No JVD  CV:  Regular, +S1 and S2, No murmurs, gallops or rubs appreciated  Lungs:   Decreased breath sound bilateral without wheeze, rales or rhonchi sLungs:  Abdomen: Soft, +BS, Non-tender, non-distended  Extremities: No clubbing, cyanosis or edema  Neuro: No focal deficits    Diagnostic Testing:    PFTs:  12/12/17:  Spirometry:  FEV1/FVC Ratio is 54%  FEV1 is 57% predicted  FVC is 82% predicted  No change with bronchodilators     Lung volumes: Total lung capacity is 127% predicted  Residual volume is 176% predicted      Diffusing capacity:  42% predicted     Flow volume loop:  Consistent with obstruction      CBC:  Lab Results   Component Value Date    WBC 7 3 10/31/2017    HGB 15 9 (H) 10/31/2017    HCT 46 8 (H) 10/31/2017    MCV 88 5 10/31/2017     10/31/2017         BMP:   Lab Results   Component Value Date     01/18/2018    K 3 8 01/18/2018     01/18/2018    CO2 24 01/18/2018    BUN 13 01/18/2018    CREATININE 0 81 01/18/2018    CALCIUM 9 3 01/18/2018    AST 18 01/18/2018    ALT 19 01/18/2018    ALKPHOS 116 01/18/2018    PROT 7 0 01/18/2018    BILITOT 0 4 01/18/2018         Imaging studies:  I have personally reviewed pertinent films in PACS  4/4/18: Ct Chest  IMPRESSION:     Stable 8 mm left upper lobe pulmonary nodule with central low density, favoring hamartoma  This pulmonary nodule has been stable for greater than 5 months  A repeat CT chest in 12 months is recommended to assess for continued stability      No new pulmonary nodules      Mild diffuse emphysematous lung changes        Claudis Cooks, DO

## 2018-04-19 NOTE — ASSESSMENT & PLAN NOTE
Overall the patient appears to be doing well  She will remain on Spiriva 2 puffs once daily  I did ask her to call her insurance company as it is telling me that this medication is covered however her pharmacy is telling her it will cost 400 dollars a month  She should continue to use ProAir/albuterol nebulizer as needed  Most importantly she needs to stop smoking  She is intolerant to Chantix  She is agreeable to enroll in pulmonary rehabilitation  She is up-to-date on her immunizations  She does not require supplemental oxygen

## 2018-04-24 ENCOUNTER — DOCUMENTATION (OUTPATIENT)
Dept: PULMONOLOGY | Facility: CLINIC | Age: 58
End: 2018-04-24

## 2018-05-25 ENCOUNTER — OFFICE VISIT (OUTPATIENT)
Dept: FAMILY MEDICINE CLINIC | Facility: CLINIC | Age: 58
End: 2018-05-25
Payer: COMMERCIAL

## 2018-05-25 VITALS
SYSTOLIC BLOOD PRESSURE: 126 MMHG | DIASTOLIC BLOOD PRESSURE: 82 MMHG | RESPIRATION RATE: 14 BRPM | WEIGHT: 157.4 LBS | HEART RATE: 76 BPM | BODY MASS INDEX: 28.97 KG/M2 | TEMPERATURE: 96.5 F | HEIGHT: 62 IN

## 2018-05-25 DIAGNOSIS — H61.21 IMPACTED CERUMEN OF RIGHT EAR: Primary | ICD-10-CM

## 2018-05-25 PROCEDURE — 99213 OFFICE O/P EST LOW 20 MIN: CPT | Performed by: NURSE PRACTITIONER

## 2018-05-25 PROCEDURE — 69210 REMOVE IMPACTED EAR WAX UNI: CPT | Performed by: NURSE PRACTITIONER

## 2018-05-25 NOTE — PROGRESS NOTES
FAMILY PRACTICE OFFICE VISIT       NAME: Christine Antonio  AGE: 60 y o  SEX: female       : 1960        MRN: [id number]    DATE: 2018  TIME: 10:23 AM    Assessment and Plan     Problem List Items Addressed This Visit     None      Visit Diagnoses     Impacted cerumen of right ear    -  Primary            1  Impacted cerumen of right ear       Right ear cerumen impaction successfully cleared with irrigation and curette  Patient tolerated well  Hearing improved  She will call if any further right ear pain  Chief Complaint     Chief Complaint   Patient presents with    Earache     Patient c/o right ear pain, nasal congestion and sinus pressure x's 4 days  History of Present Illness     Patient presents today with right ear pain and decreased hearing in right ear  She has been using OTC drops for removing ear wax and a heating pad with no relief  No fevers or chills  Some nasal congestion, no cough, sore throat, or fatigue  Review of Systems   Review of Systems   Constitutional: Negative for chills, fatigue and fever  HENT: Positive for congestion and ear pain  Negative for postnasal drip, rhinorrhea, sinus pressure, sneezing and sore throat  Respiratory: Negative for cough, chest tightness, shortness of breath and wheezing  Cardiovascular: Negative  Neurological: Negative for dizziness and headaches         Active Problem List     Patient Active Problem List   Diagnosis    Coronary atherosclerosis    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  ([address] )    DDD (degenerative disc disease), lumbar    Depression with anxiety    Esophageal reflux    Gallbladder polyp    Hyperlipidemia    Low back pain    Myofascial pain syndrome, cervical    Osteopenia    Solitary pulmonary nodule    Vitamin D deficiency    Tobacco abuse       Past Medical History:  Past Medical History:   Diagnosis Date    Hypercholesterolemia     Iliotibial band tendonitis     Last Assessed: 6/5/2015    Migraine     Prinzmetal angina (HCC)     Last Assessed: 5/23/2017    Sciatica     Last Assessed: 11/10/2014    Tobacco abuse 4/19/2018    Vertigo     Last Assessed: 4/12/2017       Past Surgical History:  Past Surgical History:   Procedure Laterality Date    COLONOSCOPY  06/2011    Complete: Dr Ebony Turcios - due in 5 years, Colonoscopy Nov 2017, Diverticulosis, 5 mm polyp, Due in 5 years    ESOPHAGOGASTRODUODENOSCOPY      Diagnostic; Last Assessed: 7/11/2014    FLEXOR TENDON REPAIR      left finger    SHOULDER SURGERY      TOTAL ABDOMINAL HYSTERECTOMY W/ BILATERAL SALPINGOOPHORECTOMY      endometriosis; Last Assessed: 5/4/2015       Family History:  Family History   Problem Relation Age of Onset    Lung cancer Mother     Other Father      Dyslipidemia    Diabetes Sister     Hypertension Family        Social History:  Social History     Social History    Marital status: /Civil Union     Spouse name: N/A    Number of children: N/A    Years of education: N/A     Occupational History    Not on file  Social History Main Topics    Smoking status: Current Every Day Smoker     Packs/day: 1 00     Years: 48 00    Smokeless tobacco: Never Used    Alcohol use No    Drug use: No    Sexual activity: Not on file     Other Topics Concern    Not on file     Social History Narrative    Working full time           I have reviewed the patient's medical history in detail; there are no changes to the history as noted in the electronic medical record  Objective     Vitals:    05/25/18 0913   BP: 126/82   Pulse: 76   Resp: 14   Temp: (!) 96 5 °F (35 8 °C)   TempSrc: Tympanic   Weight: 71 4 kg (157 lb 6 4 oz)   Height: 5' 2" (1 575 m)     Wt Readings from Last 3 Encounters:   05/25/18 71 4 kg (157 lb 6 4 oz)   04/19/18 67 1 kg (148 lb)   01/18/18 72 6 kg (160 lb 2 1 oz)     Body mass index is 28 79 kg/m²    Physical Exam   Constitutional: She appears well-developed and well-nourished  No distress  HENT:   Head: Normocephalic and atraumatic  Left Ear: Tympanic membrane and ear canal normal    Nose: Mucosal edema present  Mouth/Throat: Oropharynx is clear and moist    Unable to visualize Right TM due to cerumen impaction  Post successful removal of cerumen, right TM appears normal     Eyes: Conjunctivae are normal    Neck: Normal range of motion  Neck supple  Cardiovascular: Normal rate and regular rhythm  Pulmonary/Chest: Effort normal and breath sounds normal    Musculoskeletal: She exhibits no edema  Lymphadenopathy:     She has no cervical adenopathy  Nursing note and vitals reviewed  Ear cerumen removal  Date/Time: 5/25/2018 10:20 AM  Performed by: DORI Hatch  Authorized by: Boogie IRVIN     Patient location:  Clinic  Other Assisting Provider: No    Consent:     Consent obtained:  Verbal    Consent given by:  Patient    Risks discussed:  Bleeding, infection, TM perforation, incomplete removal, pain and dizziness    Alternatives discussed:  No treatment  Procedure details:     Local anesthetic:  None    Location:  R ear    Procedure type: curette      Procedure type comment:  And irrigation  Post-procedure details:     Complication:  None    Hearing quality:  Improved    Patient tolerance of procedure:   Tolerated well, no immediate complications        ALLERGIES:  Allergies   Allergen Reactions    Darvon [Propoxyphene] Itching       Current Medications     Current Outpatient Prescriptions   Medication Sig Dispense Refill    albuterol (2 5 mg/3 mL) 0 083 % nebulizer solution Inhale 1 vial      Albuterol Sulfate (PROAIR RESPICLICK) 740 (90 Base) MCG/ACT AEPB Inhale 2 puffs every 4 (four) hours as needed      aspirin (ECOTRIN LOW STRENGTH) 81 mg EC tablet Take 1 tablet by mouth daily      buPROPion (WELLBUTRIN XL) 150 mg 24 hr tablet Take 1 tablet by mouth daily      Cholecalciferol (VITAMIN D3) 2000 units capsule Take by mouth      cyclobenzaprine (FLEXERIL) 10 mg tablet Take 1 tablet by mouth      ezetimibe (ZETIA) 10 mg tablet Take 1 tablet by mouth daily      famotidine (PEPCID) 40 MG tablet Take 1 tablet by mouth      Flaxseed, Linseed, (FLAX SEED OIL) 1300 MG CAPS Take by mouth      nitroglycerin (NITROSTAT) 0 4 mg SL tablet Place 1 tablet under the tongue every 5 (five) minutes as needed      rosuvastatin (CRESTOR) 20 MG tablet Take 1 tablet by mouth daily      Tiotropium Bromide Monohydrate (SPIRIVA RESPIMAT) 2 5 MCG/ACT AERS Inhale 2 puffs daily      verapamil (CALAN-SR) 240 mg CR tablet Take 1 tablet by mouth daily       No current facility-administered medications for this visit            Health Maintenance     Health Maintenance   Topic Date Due    HIV SCREENING  1960    Hepatitis C Screening  1960    Depression Screening PHQ-9  1960    PAP SMEAR  1960    DTaP,Tdap,and Td Vaccines (1 - Tdap) 05/17/1980    Lung Cancer Screening  05/17/2014    INFLUENZA VACCINE  09/01/2018    COLONOSCOPY  11/30/2022    PNEUMOCOCCAL POLYSACCHARIDE VACCINE AGE 2-64 HIGH RISK  Completed     Immunization History   Administered Date(s) Administered    Influenza 08/20/2015, 11/02/2017    Influenza Quadrivalent Preservative Free 3 years and older IM 08/17/2016, 11/02/2017    Influenza TIV (IM) 1960, 09/01/2011, 01/24/2013    Pneumococcal Polysaccharide PPV23 11/15/2006, 11/02/2017       LUCIE Enriquez

## 2018-06-27 ENCOUNTER — OFFICE VISIT (OUTPATIENT)
Dept: FAMILY MEDICINE CLINIC | Facility: CLINIC | Age: 58
End: 2018-06-27
Payer: COMMERCIAL

## 2018-06-27 VITALS
BODY MASS INDEX: 28.93 KG/M2 | HEART RATE: 76 BPM | WEIGHT: 157.2 LBS | SYSTOLIC BLOOD PRESSURE: 122 MMHG | DIASTOLIC BLOOD PRESSURE: 68 MMHG | RESPIRATION RATE: 14 BRPM | TEMPERATURE: 96.6 F | HEIGHT: 62 IN

## 2018-06-27 DIAGNOSIS — R91.1 SOLITARY PULMONARY NODULE: ICD-10-CM

## 2018-06-27 DIAGNOSIS — J01.90 ACUTE SINUSITIS, RECURRENCE NOT SPECIFIED, UNSPECIFIED LOCATION: ICD-10-CM

## 2018-06-27 DIAGNOSIS — I25.10 ATHEROSCLEROSIS OF CORONARY ARTERY OF NATIVE HEART WITHOUT ANGINA PECTORIS, UNSPECIFIED VESSEL OR LESION TYPE: Primary | ICD-10-CM

## 2018-06-27 DIAGNOSIS — J44.9 MODERATE COPD (CHRONIC OBSTRUCTIVE PULMONARY DISEASE) (HCC): ICD-10-CM

## 2018-06-27 DIAGNOSIS — E78.5 HYPERLIPIDEMIA, UNSPECIFIED HYPERLIPIDEMIA TYPE: ICD-10-CM

## 2018-06-27 DIAGNOSIS — Z72.0 TOBACCO ABUSE: ICD-10-CM

## 2018-06-27 DIAGNOSIS — M77.8 RIGHT SHOULDER TENDINITIS: ICD-10-CM

## 2018-06-27 DIAGNOSIS — F41.8 DEPRESSION WITH ANXIETY: ICD-10-CM

## 2018-06-27 PROCEDURE — 99215 OFFICE O/P EST HI 40 MIN: CPT | Performed by: FAMILY MEDICINE

## 2018-06-27 RX ORDER — ALBUTEROL SULFATE 90 UG/1
2 AEROSOL, METERED RESPIRATORY (INHALATION) EVERY 4 HOURS PRN
Qty: 1 INHALER | Refills: 3 | Status: SHIPPED | OUTPATIENT
Start: 2018-06-27 | End: 2020-10-26 | Stop reason: SDUPTHER

## 2018-06-27 RX ORDER — METHYLPREDNISOLONE 4 MG/1
TABLET ORAL
Qty: 21 TABLET | Refills: 0 | Status: SHIPPED | OUTPATIENT
Start: 2018-06-27 | End: 2018-09-06 | Stop reason: ALTCHOICE

## 2018-06-27 RX ORDER — AMOXICILLIN 875 MG/1
875 TABLET, COATED ORAL 2 TIMES DAILY
Qty: 20 TABLET | Refills: 0 | Status: SHIPPED | OUTPATIENT
Start: 2018-06-27 | End: 2018-07-07

## 2018-06-27 NOTE — PROGRESS NOTES
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)    Relevant Medications    albuterol (PROVENTIL HFA,VENTOLIN HFA) 90 mcg/act inhaler    Methylprednisolone 4 MG TBPK    Depression with anxiety    Hyperlipidemia    Relevant Orders    Lipid panel    Solitary pulmonary nodule    Tobacco abuse      Other Visit Diagnoses     Right shoulder tendinitis        Relevant Medications    Methylprednisolone 4 MG TBPK    Acute sinusitis, recurrence not specified, unspecified location        Relevant Medications    Methylprednisolone 4 MG TBPK    amoxicillin (AMOXIL) 875 mg tablet       Patient presents for follow-up of chronic medical conditions  Hypertension  Hyperlipidemia  Non occlusive coronary artery disease by cardiac catherization in   Patient is asymptomatic  Patient remains on verapamil 240 mg once a day, Crestor 20 mg once a day, Zetia 10 mg daily  and aspirin  Will proceed with blood work as outlined above  She is under surveillance of [facility],Dr Dupree;  2 year follow-up  Acid reflux disease  Symptoms are well controlled on Pepcid 40 mg once a day  COPD  Left upper lobe 8 mm pulmonary nodule  Dr Rousseau follows  Recent CT 2018 :Stable 8 mm left upper lobe pulmonary nodule with central low density, favoring hamartoma  This pulmonary nodule has been stable for greater than 5 months    A repeat CT chest in 12 months is recommended to assess for continued stability    No new pulmonary nodules    Mild diffuse emphysematous lung changes  Tobacco use  I had long discussion with patient urging her to quit ASAP  She understands importance of tobacco cessation and will strongly consider  She remains on Wellbutrin 150 mg once a day which should facilitate with smoking cessation  Symptoms of COPD are well controlled on Spiriva  Sinusitis  Acute  Will start patient on amoxicillin 875 b i d  for 10 days along with Medrol Dosepak  Right shoulder tendinitis  Symptoms are rather mild  I am hopeful that patient will notice symptomatic improvement after 6 days of Medrol Dosepak  If her shoulder pain will be persisting-patient will contact me will consider ortho evaluation and PT  Health maintenance:  Patient is up-to-date with mammography and colonoscopy  Will follow up regarding blood work results over the phone  Routine follow-up 4 months  There are no Patient Instructions on file for this visit  Chief Complaint     Chief Complaint   Patient presents with    Follow-up     Patient is here for a followup   Headache     Patient c/o headache and sinus pressure x's 1 day  History of Present Illness     Patient presents for follow-up of chronic medical conditions  She is due for routine blood work  She remains on medications for hyperlipidemia, COPD, hypertension, depression  She is compliant with all medications and uses them as directed  She was recently evaluated by [facility] pulmonology for surveillance of pulmonary nodule of 8 mm  CT chest performed in April 2018 revealed no change in nodule size  Patient will be re-evaluated again in 1 year  She denies chest pain, palpitations or dyspnea  She is smoking, we had long discussion about importance of cessation  Patient is well aware of significant health risks    Patient is complaining of painful right shoulder for 3 weeks, no injury or fall, painful range of motion at times  Patient is also complaining of sinus headache, postnasal drip, sore throat and swollen right submandibular gland within past 24 hours  She took ibuprofen and Claritin early today in an effort to alleviate her discomfort  Headache    Associated symptoms include sinus pressure and a sore throat  Pertinent negatives include no dizziness  Review of Systems   Review of Systems   Constitutional: Negative  HENT: Positive for congestion, postnasal drip, sinus pressure and sore throat  Eyes: Negative  Respiratory: Negative  Cardiovascular: Negative  Gastrointestinal: Negative  Endocrine: Negative  Genitourinary: Negative  Musculoskeletal: Positive for arthralgias  Right shoulder pain for 3 weeks   Allergic/Immunologic: Positive for environmental allergies  Neurological: Positive for headaches (Right-sided sinus headache, started 1 day ago  )  Negative for dizziness  Hematological: Negative  Psychiatric/Behavioral: Negative          Active Problem List     Patient Active Problem List   Diagnosis    Coronary atherosclerosis    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  ([address] )    DDD (degenerative disc disease), lumbar    Depression with anxiety    Esophageal reflux    Gallbladder polyp    Hyperlipidemia    Low back pain    Myofascial pain syndrome, cervical    Osteopenia    Solitary pulmonary nodule    Vitamin D deficiency    Tobacco abuse       Past Medical History:  Past Medical History:   Diagnosis Date    Hypercholesterolemia     Iliotibial band tendonitis     Last Assessed: 6/5/2015    Migraine     Prinzmetal angina (HCC)     Last Assessed: 5/23/2017    Sciatica     Last Assessed: 11/10/2014    Tobacco abuse 4/19/2018    Vertigo     Last Assessed: 4/12/2017       Past Surgical History:  Past Surgical History:   Procedure Laterality Date    COLONOSCOPY  06/2011    Complete: Dr Hayes Mouse - due in 5 years, Colonoscopy Nov 2017, Diverticulosis, 5 mm polyp, Due in 5 years    ESOPHAGOGASTRODUODENOSCOPY      Diagnostic; Last Assessed: 7/11/2014    FLEXOR TENDON REPAIR      left finger    SHOULDER SURGERY      TOTAL ABDOMINAL HYSTERECTOMY W/ BILATERAL SALPINGOOPHORECTOMY      endometriosis; Last Assessed: 5/4/2015       Family History:  Family History   Problem Relation Age of Onset    Lung cancer Mother     Other Father         Dyslipidemia    Diabetes Sister     Hypertension Family        Social History:  Social History     Social History    Marital status: /Civil Union     Spouse name: N/A    Number of children: N/A    Years of education: N/A     Occupational History    Not on file  Social History Main Topics    Smoking status: Current Every Day Smoker     Packs/day: 1 00     Years: 48 00    Smokeless tobacco: Never Used    Alcohol use No    Drug use: No    Sexual activity: Not on file     Other Topics Concern    Not on file     Social History Narrative    Working full time           Objective     Vitals:    06/27/18 1146   BP: 122/68   Pulse:    Resp:    Temp:      Vitals:    06/27/18 1125 06/27/18 1146   BP: (!) 168/102 122/68   Pulse: 76    Resp: 14    Temp: (!) 96 6 °F (35 9 °C)    TempSrc: Tympanic    Weight: 71 3 kg (157 lb 3 2 oz)    Height: 5' 2" (1 575 m)        Wt Readings from Last 3 Encounters:   06/27/18 71 3 kg (157 lb 3 2 oz)   05/25/18 71 4 kg (157 lb 6 4 oz)   04/19/18 67 1 kg (148 lb)       Physical Exam   Constitutional: She is oriented to person, place, and time  She appears well-developed and well-nourished  HENT:   Head: Normocephalic and atraumatic  Nasal congestion bilaterally  Tympanic membranes are clear bilaterally, right TM is slightly retracted  Oropharynx:  Erythema, no exudates, tonsils grade 1-2  Painful bilateral cervical submandibular lymphadenopathy right more than left    Right-sided maxillary and ethmoid sinus pressure on exam   Eyes: Conjunctivae are normal    Neck: Neck supple  Carotid bruit is not present  No thyromegaly present  Cardiovascular: Normal rate, regular rhythm and normal heart sounds  Blood pressure is normal on my recheck x2 122/68 and 122/78 left and right arm   Pulmonary/Chest: Effort normal and breath sounds normal  No respiratory distress  She has no wheezes  She has no rales  Musculoskeletal:   Right shoulder:  Painful range of motion with ex tension and abduction  Patient has pretty good range of motion up to 120-150 degrees  Neurological: She is alert and oriented to person, place, and time  No cranial nerve deficit  Psychiatric: She has a normal mood and affect  Her behavior is normal    Nursing note and vitals reviewed        Pertinent Laboratory/Diagnostic Studies:  Lab Results   Component Value Date    BUN 13 01/18/2018    CREATININE 0 81 01/18/2018    CALCIUM 9 3 01/18/2018     01/18/2018    K 3 8 01/18/2018    CO2 24 01/18/2018     01/18/2018     Lab Results   Component Value Date    ALT 19 01/18/2018    AST 18 01/18/2018    ALKPHOS 116 01/18/2018    BILITOT 0 4 01/18/2018       Lab Results   Component Value Date    WBC 7 3 10/31/2017    HGB 15 9 (H) 10/31/2017    HCT 46 8 (H) 10/31/2017    MCV 88 5 10/31/2017     10/31/2017       No results found for: TSH    Lab Results   Component Value Date    CHOL 163 10/31/2017     Lab Results   Component Value Date    TRIG 167 (H) 10/31/2017     Lab Results   Component Value Date    HDL 39 (L) 10/31/2017     No results found for: [facility]  Lab Results   Component Value Date    HGBA1C 5 7 (H) 11/05/2012       Results for orders placed or performed in visit on 01/18/18   COMPREHENSIVE METABOLIC PANEL (HISTORICAL)   Result Value Ref Range    Glucose 80 65 - 99 mg/dL    BUN 13 7 - 25 mg/dL    Creatinine 0 81 0 50 - 1 05 mg/dL    EGFR-AMERICAN CALC 80 > OR = 60 mL/min/1 73m2    eGFR  93 > OR = 60 mL/min/1 73m2    BUN/CREA Ratio NOT APPLICABLE 6 - 22 (calc)    Sodium 140 135 - 146 mmol/L    Potassium 3 8 3 5 - 5 3 mmol/L    Chloride 106 98 - 110 mmol/L    CO2 24 20 - 31 mmol/L    Calcium 9 3 8 6 - 10 4 mg/dL    Total Protein 7 0 6 1 - 8 1 g/dL    Albumin 4 4 3 6 - 5 1 g/dL    GAMMA GLOBULIN 2 6 1 9 - 3 7 g/dL (calc)    A/G RATIO 1 7 1 0 - 2 5 (calc)    Total Bilirubin 0 4 0 2 - 1 2 mg/dL    Alkaline Phosphatase 116 33 - 130 U/L    AST 18 10 - 35 U/L    ALT 19 6 - 29 U/L       Orders Placed This Encounter   Procedures    CBC    Comprehensive metabolic panel    Lipid panel    TSH, 3rd generation       ALLERGIES:  Allergies   Allergen Reactions    Darvon [Propoxyphene] Itching       Current Medications     Current Outpatient Prescriptions   Medication Sig Dispense Refill    aspirin (ECOTRIN LOW STRENGTH) 81 mg EC tablet Take 1 tablet by mouth daily      buPROPion (WELLBUTRIN XL) 150 mg 24 hr tablet Take 1 tablet by mouth daily      cyclobenzaprine (FLEXERIL) 10 mg tablet Take 1 tablet by mouth      ezetimibe (ZETIA) 10 mg tablet Take 1 tablet by mouth daily      famotidine (PEPCID) 40 MG tablet Take 1 tablet by mouth      Flaxseed, Linseed, (FLAX SEED OIL) 1300 MG CAPS Take by mouth      nitroglycerin (NITROSTAT) 0 4 mg SL tablet Place 1 tablet under the tongue every 5 (five) minutes as needed      rosuvastatin (CRESTOR) 20 MG tablet Take 1 tablet by mouth daily      Tiotropium Bromide Monohydrate (SPIRIVA RESPIMAT) 2 5 MCG/ACT AERS Inhale 2 puffs daily      verapamil (CALAN-SR) 240 mg CR tablet Take 1 tablet by mouth daily      albuterol (PROVENTIL HFA,VENTOLIN HFA) 90 mcg/act inhaler Inhale 2 puffs every 4 (four) hours as needed for wheezing or shortness of breath (cough) 1 Inhaler 3    amoxicillin (AMOXIL) 875 mg tablet Take 1 tablet (875 mg total) by mouth 2 (two) times a day for 10 days 20 tablet 0    Methylprednisolone 4 MG TBPK Use as directed on package 21 tablet 0     No current facility-administered medications for this visit  Health Maintenance     Health Maintenance   Topic Date Due    HIV SCREENING  1960    Hepatitis C Screening  1960    Depression Screening PHQ-9  1959    PAP SMEAR  1959    CRC Screening: Colonoscopy  1960    DTaP,Tdap,and Td Vaccines (1 - Tdap) 05/17/1980    Lung Cancer Screening  05/17/2014    INFLUENZA VACCINE  09/01/2018    PNEUMOCOCCAL POLYSACCHARIDE VACCINE AGE 2-64 HIGH RISK  Completed     Immunization History   Administered Date(s) Administered    Influenza 08/20/2015, 11/02/2017    Influenza Quadrivalent Preservative Free 3 years and older IM 08/17/2016, 11/02/2017    Influenza TIV (IM) 1960, 09/01/2011, 01/24/2013    Pneumococcal Polysaccharide PPV23 11/15/2006, 11/02/2017       Shelle Halsted, MD

## 2018-08-01 LAB
ALBUMIN SERPL-MCNC: 4.1 G/DL (ref 3.6–5.1)
ALBUMIN/GLOB SERPL: 1.7 (CALC) (ref 1–2.5)
ALP SERPL-CCNC: 103 U/L (ref 33–130)
ALT SERPL-CCNC: 21 U/L (ref 6–29)
AST SERPL-CCNC: 17 U/L (ref 10–35)
BASOPHILS # BLD AUTO: 59 CELLS/UL (ref 0–200)
BASOPHILS NFR BLD AUTO: 0.7 %
BILIRUB SERPL-MCNC: 0.3 MG/DL (ref 0.2–1.2)
BUN SERPL-MCNC: 14 MG/DL (ref 7–25)
BUN/CREAT SERPL: NORMAL (CALC) (ref 6–22)
CALCIUM SERPL-MCNC: 9.3 MG/DL (ref 8.6–10.4)
CHLORIDE SERPL-SCNC: 104 MMOL/L (ref 98–110)
CHOLEST SERPL-MCNC: 134 MG/DL
CHOLEST/HDLC SERPL: 3 (CALC)
CO2 SERPL-SCNC: 28 MMOL/L (ref 20–31)
CREAT SERPL-MCNC: 0.81 MG/DL (ref 0.5–1.05)
EOSINOPHIL # BLD AUTO: 168 CELLS/UL (ref 15–500)
EOSINOPHIL NFR BLD AUTO: 2 %
ERYTHROCYTE [DISTWIDTH] IN BLOOD BY AUTOMATED COUNT: 12.6 % (ref 11–15)
GLOBULIN SER CALC-MCNC: 2.4 G/DL (CALC) (ref 1.9–3.7)
GLUCOSE SERPL-MCNC: 98 MG/DL (ref 65–99)
HCT VFR BLD AUTO: 48.6 % (ref 35–45)
HDLC SERPL-MCNC: 45 MG/DL
HGB BLD-MCNC: 16.5 G/DL (ref 11.7–15.5)
LDLC SERPL CALC-MCNC: 68 MG/DL (CALC)
LYMPHOCYTES # BLD AUTO: 2512 CELLS/UL (ref 850–3900)
LYMPHOCYTES NFR BLD AUTO: 29.9 %
MCH RBC QN AUTO: 30.6 PG (ref 27–33)
MCHC RBC AUTO-ENTMCNC: 34 G/DL (ref 32–36)
MCV RBC AUTO: 90 FL (ref 80–100)
MONOCYTES # BLD AUTO: 680 CELLS/UL (ref 200–950)
MONOCYTES NFR BLD AUTO: 8.1 %
NEUTROPHILS # BLD AUTO: 4981 CELLS/UL (ref 1500–7800)
NEUTROPHILS NFR BLD AUTO: 59.3 %
NONHDLC SERPL-MCNC: 89 MG/DL (CALC)
PLATELET # BLD AUTO: 186 THOUSAND/UL (ref 140–400)
PMV BLD REES-ECKER: 12.7 FL (ref 7.5–12.5)
POTASSIUM SERPL-SCNC: 4 MMOL/L (ref 3.5–5.3)
PROT SERPL-MCNC: 6.5 G/DL (ref 6.1–8.1)
RBC # BLD AUTO: 5.4 MILLION/UL (ref 3.8–5.1)
SL AMB EGFR AFRICAN AMERICAN: 92 ML/MIN/1.73M2
SL AMB EGFR NON AFRICAN AMERICAN: 79 ML/MIN/1.73M2
SODIUM SERPL-SCNC: 140 MMOL/L (ref 135–146)
TRIGL SERPL-MCNC: 128 MG/DL
TSH SERPL-ACNC: 3.65 MIU/L (ref 0.4–4.5)
WBC # BLD AUTO: 8.4 THOUSAND/UL (ref 3.8–10.8)

## 2018-08-03 ENCOUNTER — TELEPHONE (OUTPATIENT)
Dept: FAMILY MEDICINE CLINIC | Facility: CLINIC | Age: 58
End: 2018-08-03

## 2018-09-06 ENCOUNTER — OFFICE VISIT (OUTPATIENT)
Dept: FAMILY MEDICINE CLINIC | Facility: CLINIC | Age: 58
End: 2018-09-06
Payer: COMMERCIAL

## 2018-09-06 ENCOUNTER — HOSPITAL ENCOUNTER (OUTPATIENT)
Dept: CT IMAGING | Facility: HOSPITAL | Age: 58
Discharge: HOME | End: 2018-09-06
Payer: COMMERCIAL

## 2018-09-06 VITALS
HEIGHT: 62 IN | SYSTOLIC BLOOD PRESSURE: 128 MMHG | RESPIRATION RATE: 14 BRPM | DIASTOLIC BLOOD PRESSURE: 78 MMHG | HEART RATE: 78 BPM | TEMPERATURE: 97.7 F | WEIGHT: 154.4 LBS | BODY MASS INDEX: 28.41 KG/M2

## 2018-09-06 DIAGNOSIS — K57.92 ACUTE DIVERTICULITIS: ICD-10-CM

## 2018-09-06 DIAGNOSIS — K57.92 ACUTE DIVERTICULITIS: Primary | ICD-10-CM

## 2018-09-06 LAB
SL AMB  POCT GLUCOSE, UA: ABNORMAL
SL AMB LEUKOCYTE ESTERASE,UA: ABNORMAL
SL AMB POCT BILIRUBIN,UA: ABNORMAL
SL AMB POCT BLOOD,UA: ABNORMAL
SL AMB POCT CLARITY,UA: CLEAR
SL AMB POCT COLOR,UA: YELLOW
SL AMB POCT KETONES,UA: ABNORMAL
SL AMB POCT NITRITE,UA: ABNORMAL
SL AMB POCT PH,UA: 5
SL AMB POCT SPECIFIC GRAVITY,UA: 1
SL AMB POCT URINE PROTEIN: ABNORMAL
SL AMB POCT UROBILINOGEN: 0.2

## 2018-09-06 PROCEDURE — 99214 OFFICE O/P EST MOD 30 MIN: CPT | Performed by: FAMILY MEDICINE

## 2018-09-06 PROCEDURE — 74177 CT ABD & PELVIS W/CONTRAST: CPT

## 2018-09-06 PROCEDURE — 3008F BODY MASS INDEX DOCD: CPT | Performed by: FAMILY MEDICINE

## 2018-09-06 PROCEDURE — 81002 URINALYSIS NONAUTO W/O SCOPE: CPT | Performed by: FAMILY MEDICINE

## 2018-09-06 RX ORDER — METRONIDAZOLE 500 MG/1
500 TABLET ORAL 3 TIMES DAILY
Qty: 21 TABLET | Refills: 0 | Status: SHIPPED | OUTPATIENT
Start: 2018-09-06 | End: 2018-09-13

## 2018-09-06 RX ORDER — CIPROFLOXACIN 500 MG/1
500 TABLET, FILM COATED ORAL EVERY 12 HOURS SCHEDULED
Qty: 14 TABLET | Refills: 0 | Status: SHIPPED | OUTPATIENT
Start: 2018-09-06 | End: 2018-09-13

## 2018-09-06 RX ADMIN — IOHEXOL 100 ML: 350 INJECTION, SOLUTION INTRAVENOUS at 14:40

## 2018-09-06 RX ADMIN — IOHEXOL 50 ML: 240 INJECTION, SOLUTION INTRATHECAL; INTRAVASCULAR; INTRAVENOUS; ORAL at 14:40

## 2018-09-06 NOTE — PROGRESS NOTES
FAMILY PRACTICE OFFICE VISIT       NAME: John Madison  AGE: 60 y o  SEX: female       : 1960        MRN: [id number]        Assessment and Plan     Problem List Items Addressed This Visit     None      Visit Diagnoses     Acute diverticulitis    -  Primary    Relevant Medications    ciprofloxacin (CIPRO) 500 mg tablet    metroNIDAZOLE (FLAGYL) 500 mg tablet    Other Relevant Orders    CT abdomen pelvis w contrast    POCT urine dip       Patient presents for evaluation of abdominal pain  Her symptoms started 4 days ago and associated with nausea, belching, diarrhea, and lower abdominal pain  Past medical history of gallbladder polyps and diverticulosis  Patient is up-to-date with colonoscopy with the last study performed in 2017  She appears ill and fatigued on exam today  She admits to persistent nausea and decreased appetite  Exam reveals significant left lower quadrant tenderness  I am concerned about possibility of diverticulitis  Will start patient off clear liquid diet and combination of Cipro and Flagyl  She will proceed with stat CT abdomen and pelvis for further detail in  Patient will contact me within next 1-2 days if her symptoms are not improving significantly  We discussed dietary advancements slowly as tolerated  Urinalysis is clear    Patient Instructions     Diverticulitis   AMBULATORY CARE:   Diverticulitis  is a condition that causes small pockets along your intestine called diverticula to become inflamed or infected  This is caused by hard bowel movement, food, or bacteria that get stuck in the pockets         Signs and symptoms include the following:   · Pain in the lower left side of your abdomen    · Fever and chills    · Nausea or vomiting     · Constipation or diarrhea     · An urge to urinate or have a bowel movement more often than usual     · Bloody bowel movements    · Bloating and gas  Seek care immediately if:   · You have bowel movement or foul-smelling discharge leaking from your vagina or in your urine  · You have severe diarrhea  · You urinate less than usual or not at all  · You are not able to have a bowel movement  · You cannot stop vomiting  · You have cramps or severe abdominal pain and a fever  · You have new or increased blood in your bowel movements  Contact your healthcare provider if:   · You have pain when you urinate  · Your symptoms get worse or do not go away  · You have questions or concerns about your condition or care  Treatment:  Mild diverticulitis can be treated at home  You may need to rest and follow a clear liquid diet until your diverticulitis gets better  You will be admitted to the hospital if you have severe diverticulitis  You may need any of the following:  · Antibiotics  help treat or prevent a bacterial infection  · Prescription pain medicine  may be given  Ask your healthcare provider how to take this medicine safely  Some prescription pain medicines contain acetaminophen  Do not take other medicines that contain acetaminophen without talking to your healthcare provider  Too much acetaminophen may cause liver damage  Prescription pain medicine may cause constipation  Ask your healthcare provider how to prevent or treat constipation  · An IV  may be used to give you liquids and nutrition  You may not be able to eat or drink anything until your healthcare provider says it is okay  · Drainage  may be done to reduce inflammation or treat infection  Your healthcare provider may insert a small tube through an incision in your abdomen to drain pus from infected diverticula  · Surgery  may be needed if there is a hole in your bowel or a large amount of swelling  A healthcare provider will remove the infected or inflamed areas of your colon  Clear liquid diet:  A clear liquid diet includes any liquids that you can see through   Examples include water, ginger-savita, cranberry or apple juice, frozen fruit ice, or broth  Stay on a clear liquid diet until your symptoms are gone, or as directed  Follow up with your healthcare provider as directed: You may need to return for a colonoscopy  When your symptoms are gone, you may need a low-fat, high-fiber diet to prevent diverticulitis from developing again  Your healthcare provider or dietitian can help you create meal plans  Write down your questions so you remember to ask them during your visits  © 2017 [employer]  Information is for End User's use only and may not be sold, redistributed or otherwise used for commercial purposes  All illustrations and images included in CareNotes® are the copyrighted property of [employer] D [employer] M [employer] Inc  or [employer]  The above information is an  only  It is not intended as medical advice for individual conditions or treatments  Talk to your doctor, nurse or pharmacist before following any medical regimen to see if it is safe and effective for you  Chief Complaint     Chief Complaint   Patient presents with    Abdominal Pain     Patient is here for abdominal pain and diarrhea x 4 days  History of Present Illness     Abdominal pain, no fever/ occ  Chills   Abdominal pain is localized in the lower abdomen and epigastric area  Patient is concerned about recurrences of gallstones  Her symptoms started on 9/3/18  Patient complains of nausea but denies vomiting  Lose BMs-  Diarrhea x once   gas, belching   No bright red blood per rectum, no melena, last bowel movement yesterday  Decreased appetite  Bladder / lower abdomina pressure, no dysuria    Occ  Lightheadedness  No recent travel  Symptoms started day after see food party on Sunday, September 2nd  Colonoscopy 11/2017 -  Moderate diverticulosis   Dr Taylor, due in 5 years          Abdominal Pain   This is a new problem  The current episode started in the past 7 days   The onset quality is gradual  The problem occurs constantly  The problem has been gradually worsening  The pain is located in the LLQ, epigastric region and suprapubic region  The pain is moderate  The quality of the pain is colicky and cramping  The abdominal pain does not radiate  Associated symptoms include anorexia, diarrhea and nausea  Pertinent negatives include no constipation, dysuria, fever, frequency, hematochezia, hematuria, melena or vomiting  Nothing aggravates the pain  The pain is relieved by nothing  Review of Systems   Review of Systems   Constitutional: Positive for activity change, appetite change, chills and fatigue  Negative for fever  HENT: Negative  Respiratory: Negative  Cardiovascular: Negative  Gastrointestinal: Positive for abdominal pain, anorexia, diarrhea and nausea  Negative for constipation, hematochezia, melena and vomiting  Endocrine: Negative  Genitourinary: Negative for dysuria, frequency and hematuria  Musculoskeletal: Negative  Neurological: Positive for light-headedness  Hematological: Negative  Psychiatric/Behavioral: Negative          Active Problem List     Patient Active Problem List   Diagnosis    Coronary atherosclerosis    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 [address]     Last Assessed: 5/23/2017    Sciatica     Last Assessed: 11/10/2014    Tobacco abuse 4/19/2018    Vertigo     Last Assessed: 4/12/2017       Past Surgical History:  Past Surgical History:   Procedure Laterality Date    COLONOSCOPY  06/2011    Complete: Dr Bee Bender - due in 5 years, Colonoscopy Nov 2017, Diverticulosis, 5 mm polyp, Due in 5 years    ESOPHAGOGASTRODUODENOSCOPY      Diagnostic; Last Assessed: 7/11/2014    FLEXOR TENDON REPAIR      left finger    SHOULDER SURGERY      TOTAL ABDOMINAL HYSTERECTOMY W/ BILATERAL SALPINGOOPHORECTOMY      endometriosis; Last Assessed: 5/4/2015       Family History:  Family History   Problem Relation Age of Onset    Lung cancer Mother     Other Father         Dyslipidemia    Diabetes Sister     Hypertension Family        Social History:  Social History     Social History    Marital status: /Civil Union     Spouse name: N/A    Number of children: N/A    Years of education: N/A     Occupational History    Not on file  Social History Main Topics    Smoking status: Current Every Day Smoker     Packs/day: 1 00     Years: 48 00    Smokeless tobacco: Never Used    Alcohol use No    Drug use: No    Sexual activity: Not on file     Other Topics Concern    Not on file     Social History Narrative    Working full time           Objective     Vitals:    09/06/18 1032   BP: 128/78   Pulse: 78   Resp: 14   Temp: 97 7 °F (36 5 °C)     Wt Readings from Last 3 Encounters:   09/06/18 70 kg (154 lb 6 4 oz)   06/27/18 71 3 kg (157 lb 3 2 oz)   05/25/18 71 4 kg (157 lb 6 4 oz)       Physical Exam   Constitutional: She is oriented to person, place, and time  She appears well-developed and well-nourished  HENT:   Head: Normocephalic and atraumatic  Mucous membranes are dry   Eyes: Conjunctivae are normal    Neck: Neck supple  Carotid bruit is not present  No thyromegaly present  Cardiovascular: Normal rate, regular rhythm and normal heart sounds  No murmur heard  Pulmonary/Chest: Effort normal and breath sounds normal  No respiratory distress  She has no wheezes  Abdominal: Normal appearance and bowel sounds are normal  She exhibits no abdominal bruit   There is tenderness in the right lower quadrant, suprapubic area and left lower quadrant  There is rebound (Left lower quadrant area)  There is no rigidity, no guarding and no CVA tenderness  Musculoskeletal: Normal range of motion  Neurological: She is alert and oriented to person, place, and time  No cranial nerve deficit  Psychiatric: She has a normal mood and affect  Her behavior is normal    Nursing note and vitals reviewed        Pertinent Laboratory/Diagnostic Studies:  Lab Results   Component Value Date    BUN 14 07/31/2018    CREATININE 0 81 07/31/2018    CALCIUM 9 3 07/31/2018     01/18/2018    K 3 8 01/18/2018    CO2 28 07/31/2018     07/31/2018     Lab Results   Component Value Date    ALT 19 01/18/2018    AST 18 01/18/2018    ALKPHOS 103 07/31/2018    BILITOT 0 4 01/18/2018       Lab Results   Component Value Date    WBC 8 4 07/31/2018    HGB 16 5 (H) 07/31/2018    HCT 48 6 (H) 07/31/2018    MCV 90 0 07/31/2018     07/31/2018       Lab Results   Component Value Date    TSH 3 65 07/31/2018       Lab Results   Component Value Date    CHOL 163 10/31/2017     Lab Results   Component Value Date    TRIG 128 07/31/2018     Lab Results   Component Value Date    HDL 45 (L) 07/31/2018     No results found for: [facility]  Lab Results   Component Value Date    HGBA1C 5 7 (H) 11/05/2012       Results for orders placed or performed in visit on 07/31/18   Lipid panel   Result Value Ref Range    Total Cholesterol 134 <200 mg/dL    HDL 45 (L) >50 mg/dL    Triglycerides 128 <150 mg/dL    LDL Direct 68 mg/dL (calc)    Chol HDLC Ratio 3 0 <5 0 (calc)    Non-HDL Cholesterol 89 <130 mg/dL (calc)   Comprehensive metabolic panel   Result Value Ref Range    Glucose 98 65 - 99 mg/dL    BUN 14 7 - 25 mg/dL    Creatinine 0 81 0 50 - 1 05 mg/dL    eGFR Non  79 > OR = 60 mL/min/1 73m2    SL AMB EGFR  92 > OR = 60 mL/min/1 73m2    SL AMB BUN/CREATININE RATIO NOT APPLICABLE 6 - 22 (calc) Sodium 140 135 - 146 mmol/L    SL AMB POTASSIUM 4 0 3 5 - 5 3 mmol/L    Chloride 104 98 - 110 mmol/L    CO2 28 20 - 31 mmol/L    SL AMB CALCIUM 9 3 8 6 - 10 4 mg/dL    SL AMB PROTEIN, TOTAL 6 5 6 1 - 8 1 g/dL    Albumin 4 1 3 6 - 5 1 g/dL    Globulin 2 4 1 9 - 3 7 g/dL (calc)    SL AMB ALBUMIN/GLOBULIN RATIO 1 7 1 0 - 2 5 (calc)    TOTAL BILIRUBIN 0 3 0 2 - 1 2 mg/dL    Alkaline Phosphatase 103 33 - 130 U/L    SL AMB AST 17 10 - 35 U/L    SL AMB ALT 21 6 - 29 U/L   CBC and differential   Result Value Ref Range    White Blood Cell Count 8 4 3 8 - 10 8 Thousand/uL    Red Blood Cell Count 5 40 (H) 3 80 - 5 10 Million/uL    Hemoglobin 16 5 (H) 11 7 - 15 5 g/dL    HCT 48 6 (H) 35 0 - 45 0 %    MCV 90 0 80 0 - 100 0 fL    MCH 30 6 27 0 - 33 0 pg    MCHC 34 0 32 0 - 36 0 g/dL    RDW 12 6 11 0 - 15 0 %    Platelet Count 196 077 - 400 Thousand/uL    SL AMB MPV 12 7 (H) 7 5 - 12 5 fL    Neutrophils (Absolute) 4,981 1,500 - 7,800 cells/uL    Lymphocytes (Absolute) 2,512 850 - 3,900 cells/uL    Monocytes (Absolute) 680 200 - 950 cells/uL    Eosinophils (Absolute) 168 15 - 500 cells/uL    Basophils (Absolute) 59 0 - 200 cells/uL    Neutrophils 59 3 %    Lymphocytes 29 9 %    Monocytes 8 1 %    Eosinophils 2 0 %    Basophils Relative 0 7 %   TSH, 3rd generation   Result Value Ref Range    TSH 3 65 0 40 - 4 50 mIU/L       Orders Placed This Encounter   Procedures    CT abdomen pelvis w contrast    POCT urine dip       ALLERGIES:  Allergies   Allergen Reactions    Darvon [Propoxyphene] Itching       Current Medications     Current Outpatient Prescriptions   Medication Sig Dispense Refill    albuterol (PROVENTIL HFA,VENTOLIN HFA) 90 mcg/act inhaler Inhale 2 puffs every 4 (four) hours as needed for wheezing or shortness of breath (cough) 1 Inhaler 3    aspirin (ECOTRIN LOW STRENGTH) 81 mg EC tablet Take 1 tablet by mouth daily      buPROPion (WELLBUTRIN XL) 150 mg 24 hr tablet Take 1 tablet by mouth daily      cyclobenzaprine (FLEXERIL) 10 mg tablet Take 1 tablet by mouth      ezetimibe (ZETIA) 10 mg tablet Take 1 tablet by mouth daily      famotidine (PEPCID) 40 MG tablet Take 1 tablet by mouth      Flaxseed, Linseed, (FLAX SEED OIL) 1300 MG CAPS Take by mouth      nitroglycerin (NITROSTAT) 0 4 mg SL tablet Place 1 tablet under the tongue every 5 (five) minutes as needed      rosuvastatin (CRESTOR) 20 MG tablet Take 1 tablet by mouth daily      Tiotropium Bromide Monohydrate (SPIRIVA RESPIMAT) 2 5 MCG/ACT AERS Inhale 2 puffs daily      verapamil (CALAN-SR) 240 mg CR tablet Take 1 tablet by mouth daily      ciprofloxacin (CIPRO) 500 mg tablet Take 1 tablet (500 mg total) by mouth every 12 (twelve) hours for 7 days 14 tablet 0    metroNIDAZOLE (FLAGYL) 500 mg tablet Take 1 tablet (500 mg total) by mouth 3 (three) times a day for 7 days 21 tablet 0     No current facility-administered medications for this visit            Health Maintenance     Health Maintenance   Topic Date Due    PAP SMEAR  05/17/1980    Lung Cancer Screening  05/17/2014    INFLUENZA VACCINE  09/05/2019 (Originally 9/1/2018)    DTaP,Tdap,and Td Vaccines (1 - Tdap) 09/05/2019 (Originally 5/17/1980)    MAMMOGRAM  11/15/2018    Depression Screening PHQ  09/06/2019    CRC Screening: Colonoscopy  11/30/2022    Pneumococcal PPSV23 Medium Risk Adult  Completed     Immunization History   Administered Date(s) Administered    Influenza 08/20/2015, 11/02/2017    Influenza Quadrivalent Preservative Free 3 years and older IM 08/17/2016, 11/02/2017    Influenza TIV (IM) 1960, 09/01/2011, 01/24/2013    Pneumococcal Polysaccharide PPV23 11/15/2006, 11/02/2017       Scarlet Cunningham MD

## 2018-09-06 NOTE — PATIENT INSTRUCTIONS
Diverticulitis   AMBULATORY CARE:   Diverticulitis  is a condition that causes small pockets along your intestine called diverticula to become inflamed or infected  This is caused by hard bowel movement, food, or bacteria that get stuck in the pockets  Signs and symptoms include the following:   · Pain in the lower left side of your abdomen    · Fever and chills    · Nausea or vomiting     · Constipation or diarrhea     · An urge to urinate or have a bowel movement more often than usual     · Bloody bowel movements    · Bloating and gas  Seek care immediately if:   · You have bowel movement or foul-smelling discharge leaking from your vagina or in your urine  · You have severe diarrhea  · You urinate less than usual or not at all  · You are not able to have a bowel movement  · You cannot stop vomiting  · You have cramps or severe abdominal pain and a fever  · You have new or increased blood in your bowel movements  Contact your healthcare provider if:   · You have pain when you urinate  · Your symptoms get worse or do not go away  · You have questions or concerns about your condition or care  Treatment:  Mild diverticulitis can be treated at home  You may need to rest and follow a clear liquid diet until your diverticulitis gets better  You will be admitted to the hospital if you have severe diverticulitis  You may need any of the following:  · Antibiotics  help treat or prevent a bacterial infection  · Prescription pain medicine  may be given  Ask your healthcare provider how to take this medicine safely  Some prescription pain medicines contain acetaminophen  Do not take other medicines that contain acetaminophen without talking to your healthcare provider  Too much acetaminophen may cause liver damage  Prescription pain medicine may cause constipation  Ask your healthcare provider how to prevent or treat constipation       · An IV  may be used to give you liquids and nutrition  You may not be able to eat or drink anything until your healthcare provider says it is okay  · Drainage  may be done to reduce inflammation or treat infection  Your healthcare provider may insert a small tube through an incision in your abdomen to drain pus from infected diverticula  · Surgery  may be needed if there is a hole in your bowel or a large amount of swelling  A healthcare provider will remove the infected or inflamed areas of your colon  Clear liquid diet:  A clear liquid diet includes any liquids that you can see through  Examples include water, ginger-savita, cranberry or apple juice, frozen fruit ice, or broth  Stay on a clear liquid diet until your symptoms are gone, or as directed  Follow up with your healthcare provider as directed: You may need to return for a colonoscopy  When your symptoms are gone, you may need a low-fat, high-fiber diet to prevent diverticulitis from developing again  Your healthcare provider or dietitian can help you create meal plans  Write down your questions so you remember to ask them during your visits  © 2017 [address] [facility] Information is for End User's use only and may not be sold, redistributed or otherwise used for commercial purposes  All illustrations and images included in CareNotes® are the copyrighted property of [employer] M [employer] Inc  or [employer]  The above information is an  only  It is not intended as medical advice for individual conditions or treatments  Talk to your doctor, nurse or pharmacist before following any medical regimen to see if it is safe and effective for you

## 2018-09-08 ENCOUNTER — TELEPHONE (OUTPATIENT)
Dept: FAMILY MEDICINE CLINIC | Facility: CLINIC | Age: 58
End: 2018-09-08

## 2018-10-14 DIAGNOSIS — I25.10 CORONARY ARTERY DISEASE INVOLVING NATIVE HEART WITHOUT ANGINA PECTORIS, UNSPECIFIED VESSEL OR LESION TYPE: Primary | ICD-10-CM

## 2018-10-14 RX ORDER — ROSUVASTATIN CALCIUM 20 MG/1
TABLET, COATED ORAL
Qty: 90 TABLET | Refills: 3 | Status: SHIPPED | OUTPATIENT
Start: 2018-10-14 | End: 2019-05-14

## 2018-10-30 ENCOUNTER — OFFICE VISIT (OUTPATIENT)
Dept: FAMILY MEDICINE CLINIC | Facility: CLINIC | Age: 58
End: 2018-10-30
Payer: COMMERCIAL

## 2018-10-30 VITALS
HEART RATE: 80 BPM | BODY MASS INDEX: 27.9 KG/M2 | SYSTOLIC BLOOD PRESSURE: 130 MMHG | TEMPERATURE: 96.9 F | DIASTOLIC BLOOD PRESSURE: 70 MMHG | HEIGHT: 62 IN | WEIGHT: 151.6 LBS | RESPIRATION RATE: 14 BRPM

## 2018-10-30 DIAGNOSIS — Z23 NEED FOR INFLUENZA VACCINATION: ICD-10-CM

## 2018-10-30 DIAGNOSIS — F41.8 DEPRESSION WITH ANXIETY: ICD-10-CM

## 2018-10-30 DIAGNOSIS — M75.41 IMPINGEMENT SYNDROME OF RIGHT SHOULDER: ICD-10-CM

## 2018-10-30 DIAGNOSIS — R13.12 OROPHARYNGEAL DYSPHAGIA: ICD-10-CM

## 2018-10-30 DIAGNOSIS — I25.10 ATHEROSCLEROSIS OF CORONARY ARTERY OF NATIVE HEART WITHOUT ANGINA PECTORIS, UNSPECIFIED VESSEL OR LESION TYPE: ICD-10-CM

## 2018-10-30 DIAGNOSIS — E78.5 HYPERLIPIDEMIA, UNSPECIFIED HYPERLIPIDEMIA TYPE: ICD-10-CM

## 2018-10-30 DIAGNOSIS — K21.00 GASTROESOPHAGEAL REFLUX DISEASE WITH ESOPHAGITIS: ICD-10-CM

## 2018-10-30 DIAGNOSIS — J44.9 MODERATE COPD (CHRONIC OBSTRUCTIVE PULMONARY DISEASE) (HCC): Primary | ICD-10-CM

## 2018-10-30 DIAGNOSIS — Z12.39 SCREENING FOR BREAST CANCER: ICD-10-CM

## 2018-10-30 PROCEDURE — 3008F BODY MASS INDEX DOCD: CPT | Performed by: FAMILY MEDICINE

## 2018-10-30 PROCEDURE — 99215 OFFICE O/P EST HI 40 MIN: CPT | Performed by: FAMILY MEDICINE

## 2018-10-30 PROCEDURE — 90682 RIV4 VACC RECOMBINANT DNA IM: CPT

## 2018-10-30 PROCEDURE — 90471 IMMUNIZATION ADMIN: CPT

## 2018-10-30 RX ORDER — PANTOPRAZOLE SODIUM 40 MG/1
40 TABLET, DELAYED RELEASE ORAL DAILY
Qty: 30 TABLET | Refills: 5 | Status: SHIPPED | OUTPATIENT
Start: 2018-10-30 | End: 2019-02-21 | Stop reason: SDUPTHER

## 2018-10-30 RX ORDER — METHYLPREDNISOLONE 4 MG/1
TABLET ORAL
Qty: 21 TABLET | Refills: 0 | Status: SHIPPED | OUTPATIENT
Start: 2018-10-30 | End: 2019-03-04

## 2018-10-30 NOTE — PROGRESS NOTES
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) - Primary     PFTs:17:FEV1/FVC Ratio is 54%  FEV1 is 57% predicted  FVC is 82% predicted  No change with bronchodilators  Total lung capacity is 127% predicted  Residual volume is 176% predicted  Diffusing capacity:42% predicted  CT chest 1018:  8 mm left upper lobe nodule, [facility]'s pulmonology follows, neck study is scheduled in 2019  I had long discussion with patient today about importance of tobacco cessation and significant health risks  Patient smokes approximately 12-14 cigarettes daily  Patient remains on Spiriva, symptoms of chronic dyspnea are well controlled  Relevant Medications    Methylprednisolone 4 MG TBPK    Depression with anxiety     Continue Wellbutrin  mg daily, medication works well         Esophageal reflux     EGD 2017-ectopic gastric mucosa and upper esophagus, no other abnormalities; Dr Christian  Patient currently uses Pepcid 40 mg daily and has been experiencing symptoms of oropharyngeal dysphagia  Will restart Protonix 40 mg once a day for 3-4 weeks  If symptoms will not resolve-patient will proceed with further evaluation by ENT           Relevant Medications    pantoprazole (PROTONIX) 40 mg tablet    Other Relevant Orders    CBC    Comprehensive metabolic panel    Hyperlipidemia     Continue Crestor 20 mg daily  LDL 68 2018         Relevant Orders    Lipid panel    TSH, 3rd generation      Other Visit Diagnoses     Screening for breast cancer        Relevant Orders    Mammo screening bilateral w cad    Oropharyngeal dysphagia        Relevant Orders    Ambulatory Referral to Otolaryngology    Impingement syndrome of right shoulder        Relevant Medications    Methylprednisolone 4 MG TBPK    Other Relevant Orders    XR shoulder 2+ vw right    XR acromioclavicular joints bilateral w wo weights    Need for influenza vaccination        Relevant Orders    influenza vaccine, [id number], quadrivalent, recombinant, PF, 0 5 mL, for patients 18-49 yr with comorbidities (FLUBLOK) (Completed)       Patient presents for follow-up of chronic medical conditions  Assessment and plan as outlined above  Flu vaccine was administered today  New onset of oropharyngeal dysphagia  Patient is up-to-date with GI endoscopy, symptoms of reflux are well controlled with Pepcid  Will add PPI, short-term, if patient will notice improvement-then will use Protonix for a few months and then wean off slowly  If patient does not notice improvement-she will need further evaluation by ENT  Right shoulder injury, pain  Exam is consistent with impingement syndrome  I am concerned about possibility of rotator cuff sprain/tear  Will start patient on Medrol Dosepak  I advised her to discontinue ibuprofen  X-ray orders as outlined above  If symptoms will persist-she will need further evaluation by Orthopedic surgery  Routine blood work orders as outlined above, due in January  Follow up pending labs, diagnostic results, updates and in 6 months  Emphasized importance of tobacco cessation in this patient with multiple risk factors including coronary artery disease, COPD and known pulmonary nodule      I have spent 40 minutes with Patient  today in which greater than 50% of this time was spent in counseling/coordination of care regarding Diagnostic results, Prognosis, Risks and benefits of tx options, Intructions for management, Patient and family education, Importance of tx compliance, Risk factor reductions and Impressions  Patient Instructions   Please start pantoprazole 1 tablet once a day for acid reflux symptoms  If you feeling better in 1 months then try to wean off this medication by using it every other day then every 2 days etc   Please continue on Pepcid at night  If his swallowing is not improving in a few weeks:  Please call me and I will refer you to [facility] [address] and Throat doctor for further evaluation  Routine blood work in January of 2019    Will start Medrol Dosepak for right shoulder pain, please discontinue Advil  Please proceed with x-rays, I will call you with results  If you pain will not improve in a few weeks-will need evaluation by Orthopedic surgery        Chief Complaint     Chief Complaint   Patient presents with    Follow-up     Patient is here for a follow up visit  History of Present Illness     Patient presents for follow-up of chronic medical conditions  She remains on medications for hypertension, hyperlipidemia, coronary artery disease,  D  Most recent blood work performed on July 31st discussed with patient, all normal, LDL is at goal at 76  Unfortunately, she is still smoking, approximately 2/3 of pack per day  Patient remains on Spiriva as directed  Patient is complaining of Intermittent s/o  Of oropharyngeal dysphagia    No neck pain    Throat feels dry all the time, patient uses Biotene spray   She denies symptoms of belching or burping  No chest pressure or pain  Patient has been using Pepcid 40 mg daily for symptoms of chronic acid reflux  last EGD : 11/2017-  Reflux esophagitis-  No Guevara's   last colonoscopy  11/30/2017;  Dr Colvin    COPD:  Patient remains under care of [facility] pulmonology, DR America Pineda- pending CT chest  4/2019   CAD:  Patient denies any exertional symptoms, no chest pain, palpitations or dizziness, [facility]- Dr Dupree-  Due in spring 2019      Patient fell  Few weeks ago at home  And hit right upper back and shoulder  She is complaining of significantly decreased range of motion and pain of right shoulder ever since  Patient is right handed, she has been using ibuprofen 200 mg every 4 hr on a as needed basis to alleviate her discomfort  She feels slightly better than few weeks ago but is still experiencing daily significant discomfort  Increased right shoulder pain at night if patient is trying to turn on the right side  Review of Systems   Review of Systems   Constitutional: Negative  HENT: Negative  Eyes: Negative  Respiratory: Positive for shortness of breath (Chronic) and wheezing  Cardiovascular: Negative  Gastrointestinal: Negative for abdominal pain, blood in stool and constipation  Oropharyngeal dysphagia, no abdominal pain   Endocrine: Negative  Genitourinary: Negative  Musculoskeletal: Positive for arthralgias  Neck pain:  right shoulder pain  Skin: Negative  Allergic/Immunologic: Negative  Neurological: Negative  Hematological: Negative  Psychiatric/Behavioral: Negative          Active Problem List     Patient Active Problem List   Diagnosis    Coronary atherosclerosis    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  ([address] )    DDD (degenerative disc disease), lumbar    Depression with anxiety    Esophageal reflux    Gallbladder polyp    Hyperlipidemia    Low back pain    Myofascial pain syndrome, cervical    Osteopenia    Solitary pulmonary nodule    Vitamin D deficiency    Tobacco abuse       Past Medical History:  Past Medical History:   Diagnosis Date    Hypercholesterolemia     Iliotibial band tendonitis     Last Assessed: 6/5/2015    Migraine     Prinzmetal angina ([facility] [address] )     Last Assessed: 5/23/2017    Sciatica     Last Assessed: 11/10/2014    Tobacco abuse 4/19/2018    Vertigo     Last Assessed: 4/12/2017       Past Surgical History:  Past Surgical History:   Procedure Laterality Date    COLONOSCOPY 06/2011    Complete: Dr Shannon Early - due in 5 years, Colonoscopy Nov 2017, Diverticulosis, 5 mm polyp, Due in 5 years    ESOPHAGOGASTRODUODENOSCOPY      Diagnostic; Last Assessed: 7/11/2014    FLEXOR TENDON REPAIR      left finger    SHOULDER SURGERY      TOTAL ABDOMINAL HYSTERECTOMY W/ BILATERAL SALPINGOOPHORECTOMY      endometriosis; Last Assessed: 5/4/2015       Family History:  Family History   Problem Relation Age of Onset    Lung cancer Mother     Other Father         Dyslipidemia    Diabetes Sister     Hypertension Family        Social History:  Social History     Social History    Marital status: /Civil Union     Spouse name: N/A    Number of children: N/A    Years of education: N/A     Occupational History    Not on file  Social History Main Topics    Smoking status: Current Every Day Smoker     Packs/day: 1 00     Years: 48 00    Smokeless tobacco: Never Used    Alcohol use No    Drug use: No    Sexual activity: Not on file     Other Topics Concern    Not on file     Social History Narrative    Working full time         Ready to quit: Yes  Counseling given: Yes        Objective     Vitals:    10/30/18 1059   BP: 130/70   Pulse: 80   Resp: 14   Temp: (!) 96 9 °F (36 1 °C)   TempSrc: Tympanic   Weight: 68 8 kg (151 lb 9 6 oz)   Height: 5' 2" (1 575 m)     Wt Readings from Last 3 Encounters:   10/30/18 68 8 kg (151 lb 9 6 oz)   09/06/18 70 kg (154 lb 6 4 oz)   06/27/18 71 3 kg (157 lb 3 2 oz)       Physical Exam   Constitutional: She is oriented to person, place, and time  She appears well-developed and well-nourished  HENT:   Head: Normocephalic and atraumatic  Eyes: Conjunctivae are normal    Neck: Neck supple  Carotid bruit is not present  No thyromegaly present  Cardiovascular: Normal rate, regular rhythm and normal heart sounds  No murmur heard  Pulmonary/Chest: Effort normal and breath sounds normal  No respiratory distress  She has no wheezes     Abdominal: Bowel sounds are normal  She exhibits no abdominal bruit  Musculoskeletal: She exhibits no edema  Right shoulder:  Significantly decreased range of motion with abduction and extension, limited at 90° with significant pain  Patient is not able to perform above head range of motion on the right side  Neck:  Full range of motion, no symptoms of upper extremity radiculopathy   Neurological: She is alert and oriented to person, place, and time  No cranial nerve deficit  Coordination normal    Psychiatric: She has a normal mood and affect  Her behavior is normal    Nursing note and vitals reviewed        Pertinent Laboratory/Diagnostic Studies:  Lab Results   Component Value Date    BUN 14 07/31/2018    CREATININE 0 81 01/18/2018    CALCIUM 9 3 07/31/2018     01/18/2018    K 4 0 07/31/2018    CO2 28 07/31/2018     07/31/2018     Lab Results   Component Value Date    ALT 19 01/18/2018    AST 18 01/18/2018    ALKPHOS 103 07/31/2018    BILITOT 0 4 01/18/2018       Lab Results   Component Value Date    WBC 8 4 07/31/2018    HGB 16 5 (H) 07/31/2018    HCT 48 6 (H) 07/31/2018    MCV 90 0 07/31/2018     07/31/2018       Lab Results   Component Value Date    TSH 3 65 07/31/2018       Lab Results   Component Value Date    CHOL 163 10/31/2017     Lab Results   Component Value Date    TRIG 128 07/31/2018     Lab Results   Component Value Date    HDL 45 (L) 07/31/2018     No results found for: [address]  Lab Results   Component Value Date    HGBA1C 5 7 (H) 11/05/2012       Results for orders placed or performed in visit on 09/06/18   POCT urine dip   Result Value Ref Range    LEUKOCYTE ESTERASE,UA -     NITRITE,UA -     SL AMB POCT UROBILINOGEN 0 2     SL AMB POCT URINE PROTEIN -      PH,UA 5 0      BLOOD,UA -     SPECIFIC GRAVITY,UA 1 005     KETONES,UA -      BILIRUBIN,UA -     GLUCOSE, UA -      COLOR,UA Yellow      CLARITY,UA Clear        Orders Placed This Encounter   Procedures    Mammo screening bilateral w cad    XR shoulder 2+ vw right    XR acromioclavicular joints bilateral w wo weights    influenza vaccine, [id number], quadrivalent, recombinant, PF, 0 5 mL, for patients 18-49 yr with comorbidities (FLUBLOK)    CBC    Comprehensive metabolic panel    Lipid panel    TSH, 3rd generation    Ambulatory Referral to Otolaryngology       ALLERGIES:  Allergies   Allergen Reactions    Darvon [Propoxyphene] Itching       Current Medications     Current Outpatient Prescriptions   Medication Sig Dispense Refill    albuterol (PROVENTIL HFA,VENTOLIN HFA) 90 mcg/act inhaler Inhale 2 puffs every 4 (four) hours as needed for wheezing or shortness of breath (cough) 1 Inhaler 3    aspirin (ECOTRIN LOW STRENGTH) 81 mg EC tablet Take 1 tablet by mouth daily      buPROPion (WELLBUTRIN XL) 150 mg 24 hr tablet Take 1 tablet by mouth daily      cyclobenzaprine (FLEXERIL) 10 mg tablet Take 1 tablet by mouth      ezetimibe (ZETIA) 10 mg tablet Take 1 tablet by mouth daily      famotidine (PEPCID) 40 MG tablet Take 1 tablet by mouth      Flaxseed, Linseed, (FLAX SEED OIL) 1300 MG CAPS Take by mouth      nitroglycerin (NITROSTAT) 0 4 mg SL tablet Place 1 tablet under the tongue every 5 (five) minutes as needed      rosuvastatin (CRESTOR) 20 MG tablet TAKE 1 TABLET DAILY 90 tablet 3    Tiotropium Bromide Monohydrate (SPIRIVA RESPIMAT) 2 5 MCG/ACT AERS Inhale 2 puffs daily      verapamil (CALAN-SR) 240 mg CR tablet Take 1 tablet by mouth daily      Methylprednisolone 4 MG TBPK Use as directed on package 21 tablet 0    pantoprazole (PROTONIX) 40 mg tablet Take 1 tablet (40 mg total) by mouth daily 30 tablet 5     No current facility-administered medications for this visit            Health Maintenance     Health Maintenance   Topic Date Due    MAMMOGRAM  11/15/2018    DTaP,Tdap,and Td Vaccines (1 - Tdap) 09/05/2019 (Originally 5/17/1980)    Lung Cancer Screening  04/04/2019    Depression Screening PHQ  09/06/2019    CRC Screening: Colonoscopy  11/30/2022    INFLUENZA VACCINE  Completed    Pneumococcal PPSV23 Medium Risk Adult  Completed     Immunization History   Administered Date(s) Administered    Influenza 08/20/2015, 11/02/2017    Influenza Quadrivalent Preservative Free 3 years and older IM 08/17/2016, 11/02/2017    Influenza TIV (IM) 1960, 09/01/2011, 01/24/2013    Influenza, recombinant, quadrivalent,injectable, preservative free 10/30/2018    Pneumococcal Polysaccharide PPV23 11/15/2006, 11/02/2017       Anton Landis MD

## 2018-10-30 NOTE — PATIENT INSTRUCTIONS
Please start pantoprazole 1 tablet once a day for acid reflux symptoms    If you feeling better in 1 months then try to wean off this medication by using it every other day then every 2 days etc   Please continue on Pepcid at night  If his swallowing is not improving in a few weeks:  Please call me and I will refer you to [facility] and Throat doctor for further evaluation  Routine blood work in January of 2019    Will start Medrol Dosepak for right shoulder pain, please discontinue Advil  Please proceed with x-rays, I will call you with results  If you pain will not improve in a few weeks-will need evaluation by Orthopedic surgery

## 2018-11-01 NOTE — ASSESSMENT & PLAN NOTE
EGD 11/2017-ectopic gastric mucosa and upper esophagus, no other abnormalities; Dr Christian  Patient currently uses Pepcid 40 mg daily and has been experiencing symptoms of oropharyngeal dysphagia  Will restart Protonix 40 mg once a day for 3-4 weeks  If symptoms will not resolve-patient will proceed with further evaluation by ENT

## 2018-11-01 NOTE — ASSESSMENT & PLAN NOTE
PFTs:12/12/17:FEV1/FVC Ratio is 54%  FEV1 is 57% predicted  FVC is 82% predicted  No change with bronchodilators  Total lung capacity is 127% predicted  Residual volume is 176% predicted  Diffusing capacity:42% predicted  CT chest April 2 1018:  8 mm left upper lobe nodule, [facility] pulmonology follows, neck study is scheduled in April 2019  I had long discussion with patient today about importance of tobacco cessation and significant health risks  Patient smokes approximately 12-14 cigarettes daily  Patient remains on Spiriva, symptoms of chronic dyspnea are well controlled

## 2018-11-01 NOTE — ASSESSMENT & PLAN NOTE
Non occlusive coronary artery disease by cardiac catherization in 1999    Daily regimen includes verapamil, aspirin 81 mg daily and Crestor 20 mg once a day

## 2018-11-16 DIAGNOSIS — I25.119 CORONARY ARTERY DISEASE INVOLVING NATIVE HEART WITH ANGINA PECTORIS, UNSPECIFIED VESSEL OR LESION TYPE (HCC): Primary | ICD-10-CM

## 2018-11-16 DIAGNOSIS — F33.9 RECURRENT MAJOR DEPRESSIVE DISORDER, REMISSION STATUS UNSPECIFIED (HCC): ICD-10-CM

## 2018-11-16 RX ORDER — BUPROPION HYDROCHLORIDE 150 MG/1
TABLET ORAL
Qty: 90 TABLET | Refills: 3 | Status: SHIPPED | OUTPATIENT
Start: 2018-11-16 | End: 2019-10-02 | Stop reason: SDUPTHER

## 2018-11-16 RX ORDER — EZETIMIBE 10 MG/1
TABLET ORAL
Qty: 90 TABLET | Refills: 3 | Status: SHIPPED | OUTPATIENT
Start: 2018-11-16 | End: 2019-10-02

## 2018-11-16 RX ORDER — VERAPAMIL HYDROCHLORIDE 240 MG/1
TABLET, FILM COATED, EXTENDED RELEASE ORAL
Qty: 90 TABLET | Refills: 3 | Status: SHIPPED | OUTPATIENT
Start: 2018-11-16 | End: 2019-11-21 | Stop reason: SDUPTHER

## 2018-12-27 ENCOUNTER — HOSPITAL ENCOUNTER (OUTPATIENT)
Dept: RADIOLOGY | Facility: HOSPITAL | Age: 58
Discharge: HOME | End: 2018-12-27
Payer: COMMERCIAL

## 2018-12-27 ENCOUNTER — TRANSCRIBE ORDERS (OUTPATIENT)
Dept: RADIOLOGY | Facility: HOSPITAL | Age: 58
End: 2018-12-27

## 2018-12-27 VITALS — HEIGHT: 62 IN | WEIGHT: 151 LBS | BODY MASS INDEX: 27.79 KG/M2

## 2018-12-27 DIAGNOSIS — M75.41 IMPINGEMENT SYNDROME OF RIGHT SHOULDER: ICD-10-CM

## 2018-12-27 DIAGNOSIS — Z12.39 SCREENING FOR BREAST CANCER: ICD-10-CM

## 2018-12-27 PROCEDURE — 73050 X-RAY EXAM OF SHOULDERS: CPT

## 2018-12-27 PROCEDURE — 77067 SCR MAMMO BI INCL CAD: CPT

## 2018-12-27 PROCEDURE — 73030 X-RAY EXAM OF SHOULDER: CPT

## 2018-12-28 ENCOUNTER — TELEPHONE (OUTPATIENT)
Dept: FAMILY MEDICINE CLINIC | Facility: CLINIC | Age: 58
End: 2018-12-28

## 2018-12-28 NOTE — TELEPHONE ENCOUNTER
----- Message from Javid Blancas MD sent at [id number]/95/7158 12:57 PM EST -----  Recent x-rays to his shows slight abnormality of acromioclavicular joint that may be related to injury    If patient is still having shoulder discomfort I would recommend consultation with [address] [place] [address]

## 2019-02-21 DIAGNOSIS — K21.00 GASTROESOPHAGEAL REFLUX DISEASE WITH ESOPHAGITIS: ICD-10-CM

## 2019-02-21 RX ORDER — PANTOPRAZOLE SODIUM 40 MG/1
40 TABLET, DELAYED RELEASE ORAL DAILY
Qty: 90 TABLET | Refills: 1 | Status: SHIPPED | OUTPATIENT
Start: 2019-02-21 | End: 2019-03-04

## 2019-03-04 ENCOUNTER — OFFICE VISIT (OUTPATIENT)
Dept: FAMILY MEDICINE CLINIC | Facility: CLINIC | Age: 59
End: 2019-03-04
Payer: COMMERCIAL

## 2019-03-04 VITALS
DIASTOLIC BLOOD PRESSURE: 70 MMHG | TEMPERATURE: 96.9 F | HEART RATE: 80 BPM | SYSTOLIC BLOOD PRESSURE: 110 MMHG | RESPIRATION RATE: 16 BRPM | BODY MASS INDEX: 29.96 KG/M2 | WEIGHT: 162.8 LBS | HEIGHT: 62 IN

## 2019-03-04 DIAGNOSIS — J01.90 ACUTE SINUSITIS, RECURRENCE NOT SPECIFIED, UNSPECIFIED LOCATION: Primary | ICD-10-CM

## 2019-03-04 DIAGNOSIS — M79.18 MYOFASCIAL PAIN SYNDROME, CERVICAL: ICD-10-CM

## 2019-03-04 PROCEDURE — 3008F BODY MASS INDEX DOCD: CPT | Performed by: FAMILY MEDICINE

## 2019-03-04 PROCEDURE — 99213 OFFICE O/P EST LOW 20 MIN: CPT | Performed by: FAMILY MEDICINE

## 2019-03-04 RX ORDER — AMOXICILLIN AND CLAVULANATE POTASSIUM 875; 125 MG/1; MG/1
1 TABLET, FILM COATED ORAL EVERY 12 HOURS SCHEDULED
Qty: 20 TABLET | Refills: 0 | Status: SHIPPED | OUTPATIENT
Start: 2019-03-04 | End: 2019-03-14

## 2019-03-04 RX ORDER — CYCLOBENZAPRINE HCL 10 MG
TABLET ORAL
Qty: 90 TABLET | Refills: 1 | Status: SHIPPED | OUTPATIENT
Start: 2019-03-04 | End: 2021-07-13 | Stop reason: HOSPADM

## 2019-03-04 RX ORDER — PREDNISONE 20 MG/1
40 TABLET ORAL DAILY
Qty: 10 TABLET | Refills: 0 | Status: SHIPPED | OUTPATIENT
Start: 2019-03-04 | End: 2019-03-09

## 2019-03-04 NOTE — PROGRESS NOTES
FAMILY PRACTICE OFFICE VISIT       NAME: Suen Navarro  AGE: 60 y o  SEX: female       : 1960        MRN: [id number]        Assessment and Plan     Problem List Items Addressed This Visit        Other    Myofascial pain syndrome, cervical    Relevant Medications    cyclobenzaprine (FLEXERIL) 10 mg tablet      Other Visit Diagnoses     Acute sinusitis, recurrence not specified, unspecified location    -  Primary    Relevant Medications    amoxicillin-clavulanate (AUGMENTIN) 875-125 mg per tablet    predniSONE 20 mg tablet       Patient presents for evaluation of acute sinusitis  Will start her on Augmentin 875 mg twice a day for 10 days along with prednisone 40 mg daily  She will use Flonase over-the-counter  I advised her to purchase Afrin nasal spray and use it 30-45 minutes prior to departure today  Patient will use saline nasal spray within next few days as well  Advised rest, fluids, patient will contact me if her symptoms are not improving  There are no Patient Instructions on file for this visit  M*Modal software was used to dictate this note  It may contain errors with dictating incorrect words/spelling  Please contact provider directly with any questions  Chief Complaint     Chief Complaint   Patient presents with   Maximino Shy Like Symptoms     Pt is here for rt earche, sinus and dizziness 3 = days       History of Present Illness     Patient presents for evaluation of persistent sinus pressure, postnasal drip, dry painful sore throat, right-sided facial pressure  Her symptoms started 3-4 days ago  She is complaining of persistent throat dryness and discomfort  Patient has been using lozenges and Biotene spray  Patient denies fever  She is experiencing persistent right-sided sinus maxillary and frontal pressure, right earache and swollen glands  No sick contacts     Intermittent symptoms of mild dizziness/lightheadedness with moving or turning around, symptoms started 3-4 days ago as well, no headaches  Patient is flying to [place] later today, direct flight  Review of Systems   Review of Systems   Constitutional: Positive for fatigue  Negative for fever  HENT: Positive for congestion, ear pain, postnasal drip, sinus pressure, sinus pain and sore throat  Eyes: Negative  Respiratory: Negative  Cardiovascular: Negative  Gastrointestinal: Negative  Musculoskeletal: Positive for neck pain (Intermittent chronic neck spasms, patient is requesting p r n  Prescription for Flexeril that helps)  Negative for back pain  Neurological: Positive for dizziness  Negative for headaches  Hematological: Negative  Psychiatric/Behavioral: Negative  Active Problem List     Patient Active Problem List   Diagnosis    Coronary atherosclerosis    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  ([facility] [address] )    DDD (degenerative disc disease), lumbar    Depression with anxiety    Esophageal reflux    Gallbladder polyp    Hyperlipidemia    Low back pain    Myofascial pain syndrome, cervical    Osteopenia    Solitary pulmonary nodule    Vitamin D deficiency    Tobacco abuse       Past Medical History:  Past Medical History:   Diagnosis Date    Hypercholesterolemia     Iliotibial band tendonitis     Last Assessed: 6/5/2015    Migraine     Prinzmetal angina ([facility] )     Last Assessed: 5/23/2017    Sciatica     Last Assessed: 11/10/2014    Tobacco abuse 4/19/2018    Vertigo     Last Assessed: 4/12/2017       Past Surgical History:  Past Surgical History:   Procedure Laterality Date    COLONOSCOPY  06/2011    Complete: Dr Wojciech Link - due in 5 years, Colonoscopy Nov 2017, Diverticulosis, 5 mm polyp, Due in 5 years    ESOPHAGOGASTRODUODENOSCOPY      Diagnostic;  Last Assessed: 7/11/2014    FLEXOR TENDON REPAIR      left finger    HYSTERECTOMY      @ [address] 28    SHOULDER SURGERY      TOTAL ABDOMINAL HYSTERECTOMY W/ BILATERAL SALPINGOOPHORECTOMY endometriosis;  Last Assessed: 5/4/2015       Family History:  Family History   Problem Relation Age of Onset    Lung cancer Mother     Other Father         Dyslipidemia    Diabetes Sister     Hypertension Family     Breast cancer Maternal Grandmother 48       Social History:  Social History     Socioeconomic History    Marital status: /Civil Union     Spouse name: Not on file    Number of children: Not on file    Years of education: Not on file    Highest education level: Not on file   Occupational History    Not on file   Social Needs    Financial resource strain: Not on file    Food insecurity:     Worry: Not on file     Inability: Not on file    Transportation needs:     Medical: Not on file     Non-medical: Not on file   Tobacco Use    Smoking status: Current Every Day Smoker     Packs/day: 1 00     Years: 48 00     Pack years: 48 00    Smokeless tobacco: Never Used   Substance and Sexual Activity    Alcohol use: No    Drug use: No    Sexual activity: Not on file   Lifestyle    Physical activity:     Days per week: Not on file     Minutes per session: Not on file    Stress: Not on file   Relationships    Social connections:     Talks on phone: Not on file     Gets together: Not on file     Attends [employer] service: Not on file     Active member of club or organization: Not on file     Attends meetings of clubs or organizations: Not on file     Relationship status: Not on file    Intimate partner violence:     Fear of current or ex partner: Not on file     Emotionally abused: Not on file     Physically abused: Not on file     Forced sexual activity: Not on file   Other Topics Concern    Not on file   Social History Narrative    Working full time       Objective     Vitals:    03/04/19 1324   BP: 110/70   Pulse: 80   Resp: 16   Temp: (!) 96 9 °F (36 1 °C)   TempSrc: Tympanic   Weight: 73 8 kg (162 lb 12 8 oz)   Height: 5' 2 01" (1 575 m)     Wt Readings from Last 3 Encounters: 03/04/19 73 8 kg (162 lb 12 8 oz)   12/27/18 68 5 kg (151 lb)   10/30/18 68 8 kg (151 lb 9 6 oz)       Physical Exam   Constitutional: She is oriented to person, place, and time  She appears well-developed and well-nourished  HENT:   Head: Normocephalic and atraumatic  Right Ear: Tympanic membrane normal    Left Ear: Tympanic membrane normal    Nose: Mucosal edema present  Mouth/Throat: Posterior oropharyngeal erythema present  No oropharyngeal exudate (Bright oropharyngeal erythema)  Reproducible right frontal and right maxillary pressure on exam   Eyes: Conjunctivae are normal    Neck: Normal range of motion  Neck supple  Cardiovascular: Normal rate, regular rhythm and normal heart sounds  No murmur heard  Pulmonary/Chest: Effort normal and breath sounds normal  No respiratory distress  She has no wheezes  She has no rales  Lymphadenopathy:     She has cervical adenopathy ( bilateral submandibular, tender)  Neurological: She is alert and oriented to person, place, and time  She has normal reflexes  No cranial nerve deficit  Psychiatric: She has a normal mood and affect  Her behavior is normal    Nursing note and vitals reviewed        Pertinent Laboratory/Diagnostic Studies:  Lab Results   Component Value Date    BUN 14 07/31/2018    CREATININE 0 81 01/18/2018    CALCIUM 9 3 07/31/2018     01/18/2018    K 4 0 07/31/2018    CO2 28 07/31/2018     07/31/2018     Lab Results   Component Value Date    ALT 21 07/31/2018    AST 17 07/31/2018    ALKPHOS 103 07/31/2018    BILITOT 0 4 01/18/2018       Lab Results   Component Value Date    WBC 8 4 07/31/2018    HGB 16 5 (H) 07/31/2018    HCT 48 6 (H) 07/31/2018    MCV 90 0 07/31/2018     07/31/2018       Lab Results   Component Value Date    TSH 3 65 07/31/2018       Lab Results   Component Value Date    CHOL 163 10/31/2017     Lab Results   Component Value Date    TRIG 128 07/31/2018     Lab Results   Component Value Date    HDL 45 (L) 07/31/2018     No results found for: Rita Devries  Lab Results   Component Value Date    HGBA1C 5 7 (H) 11/05/2012       Results for orders placed or performed in visit on 09/06/18   POCT urine dip   Result Value Ref Range    LEUKOCYTE ESTERASE,UA -     NITRITE,UA -     SL AMB POCT UROBILINOGEN 0 2     POCT URINE PROTEIN -      PH,UA 5 0     BLOOD,UA -     SPECIFIC GRAVITY,UA 1 005     KETONES,UA -     BILIRUBIN,UA -     GLUCOSE, UA -      COLOR,UA Yellow     CLARITY,UA Clear        No orders of the defined types were placed in this encounter        ALLERGIES:  Allergies   Allergen Reactions    Darvon [Propoxyphene] Itching       Current Medications     Current Outpatient Medications   Medication Sig Dispense Refill    albuterol (PROVENTIL HFA,VENTOLIN HFA) 90 mcg/act inhaler Inhale 2 puffs every 4 (four) hours as needed for wheezing or shortness of breath (cough) 1 Inhaler 3    aspirin (ECOTRIN LOW STRENGTH) 81 mg EC tablet Take 1 tablet by mouth daily      buPROPion (WELLBUTRIN XL) 150 mg 24 hr tablet TAKE 1 TABLET EVERY DAY 90 tablet 3    cyclobenzaprine (FLEXERIL) 10 mg tablet Take 1 tablet at bedtime as needed for muscle spasm 90 tablet 1    ezetimibe (ZETIA) 10 mg tablet TAKE 1 TABLET EVERY DAY 90 tablet 3    famotidine (PEPCID) 40 MG tablet Take 1 tablet by mouth      nitroglycerin (NITROSTAT) 0 4 mg SL tablet Place 1 tablet under the tongue every 5 (five) minutes as needed      rosuvastatin (CRESTOR) 20 MG tablet TAKE 1 TABLET DAILY 90 tablet 3    Tiotropium Bromide Monohydrate (SPIRIVA RESPIMAT) 2 5 MCG/ACT AERS Inhale 2 puffs daily      verapamil (CALAN-SR) 240 mg CR tablet TAKE 1 TABLET DAILY 90 tablet 3    amoxicillin-clavulanate (AUGMENTIN) 875-125 mg per tablet Take 1 tablet by mouth every 12 (twelve) hours for 10 days 20 tablet 0    Flaxseed, Linseed, (FLAX SEED OIL) 1300 MG CAPS Take by mouth      predniSONE 20 mg tablet Take 2 tablets (40 mg total) by mouth daily for 5 days 10 tablet 0 No current facility-administered medications for this visit            [facility]     Health Maintenance   Topic Date Due    Hepatitis C Screening  1960    BMI: Followup Plan  05/17/1977    DTaP,Tdap,and Td Vaccines (1 - Tdap) 09/05/2019 (Originally 5/17/1980)    MAMMOGRAM  12/27/2019    BMI: Adult  03/04/2020    CRC Screening: Colonoscopy  11/30/2022    INFLUENZA VACCINE  Completed    Pneumococcal PPSV23 Medium Risk Adult  Completed    HEPATITIS B VACCINES  Aged Out     Immunization History   Administered Date(s) Administered    INFLUENZA 08/20/2015, 11/02/2017    Influenza Quadrivalent Preservative Free 3 years and older IM 08/17/2016, 11/02/2017    Influenza TIV (IM) 1960, 09/01/2011, 01/24/2013    Influenza, recombinant, quadrivalent,injectable, preservative free 10/30/2018    Pneumococcal Polysaccharide PPV23 11/15/2006, 11/02/2017       Darius Garcia MD

## 2019-03-21 LAB
ALBUMIN SERPL-MCNC: 4.1 G/DL (ref 3.6–5.1)
ALBUMIN/GLOB SERPL: 1.6 (CALC) (ref 1–2.5)
ALP SERPL-CCNC: 100 U/L (ref 33–130)
ALT SERPL-CCNC: 19 U/L (ref 6–29)
AST SERPL-CCNC: 16 U/L (ref 10–35)
BASOPHILS # BLD AUTO: 42 CELLS/UL (ref 0–200)
BASOPHILS NFR BLD AUTO: 0.5 %
BILIRUB SERPL-MCNC: 0.4 MG/DL (ref 0.2–1.2)
BUN SERPL-MCNC: 11 MG/DL (ref 7–25)
BUN/CREAT SERPL: ABNORMAL (CALC) (ref 6–22)
CALCIUM SERPL-MCNC: 9.2 MG/DL (ref 8.6–10.4)
CHLORIDE SERPL-SCNC: 105 MMOL/L (ref 98–110)
CHOLEST SERPL-MCNC: 139 MG/DL
CHOLEST/HDLC SERPL: 3.4 (CALC)
CO2 SERPL-SCNC: 28 MMOL/L (ref 20–32)
CREAT SERPL-MCNC: 0.85 MG/DL (ref 0.5–1.05)
EOSINOPHIL # BLD AUTO: 176 CELLS/UL (ref 15–500)
EOSINOPHIL NFR BLD AUTO: 2.1 %
ERYTHROCYTE [DISTWIDTH] IN BLOOD BY AUTOMATED COUNT: 12.6 % (ref 11–15)
GLOBULIN SER CALC-MCNC: 2.6 G/DL (CALC) (ref 1.9–3.7)
GLUCOSE SERPL-MCNC: 108 MG/DL (ref 65–99)
HCT VFR BLD AUTO: 47.3 % (ref 35–45)
HDLC SERPL-MCNC: 41 MG/DL
HGB BLD-MCNC: 16.1 G/DL (ref 11.7–15.5)
LDLC SERPL CALC-MCNC: 75 MG/DL (CALC)
LYMPHOCYTES # BLD AUTO: 1991 CELLS/UL (ref 850–3900)
LYMPHOCYTES NFR BLD AUTO: 23.7 %
MCH RBC QN AUTO: 30.3 PG (ref 27–33)
MCHC RBC AUTO-ENTMCNC: 34 G/DL (ref 32–36)
MCV RBC AUTO: 88.9 FL (ref 80–100)
MONOCYTES # BLD AUTO: 596 CELLS/UL (ref 200–950)
MONOCYTES NFR BLD AUTO: 7.1 %
NEUTROPHILS # BLD AUTO: 5594 CELLS/UL (ref 1500–7800)
NEUTROPHILS NFR BLD AUTO: 66.6 %
NONHDLC SERPL-MCNC: 98 MG/DL (CALC)
PLATELET # BLD AUTO: 173 THOUSAND/UL (ref 140–400)
PMV BLD REES-ECKER: 12.5 FL (ref 7.5–12.5)
POTASSIUM SERPL-SCNC: 4 MMOL/L (ref 3.5–5.3)
PROT SERPL-MCNC: 6.7 G/DL (ref 6.1–8.1)
RBC # BLD AUTO: 5.32 MILLION/UL (ref 3.8–5.1)
SL AMB EGFR AFRICAN AMERICAN: 87 ML/MIN/1.73M2
SL AMB EGFR NON AFRICAN AMERICAN: 75 ML/MIN/1.73M2
SODIUM SERPL-SCNC: 140 MMOL/L (ref 135–146)
TRIGL SERPL-MCNC: 148 MG/DL
TSH SERPL-ACNC: 1.6 MIU/L (ref 0.4–4.5)
WBC # BLD AUTO: 8.4 THOUSAND/UL (ref 3.8–10.8)

## 2019-03-29 ENCOUNTER — TELEPHONE (OUTPATIENT)
Dept: FAMILY MEDICINE CLINIC | Facility: CLINIC | Age: 59
End: 2019-03-29

## 2019-03-29 ENCOUNTER — OFFICE VISIT (OUTPATIENT)
Dept: FAMILY MEDICINE CLINIC | Facility: CLINIC | Age: 59
End: 2019-03-29
Payer: COMMERCIAL

## 2019-03-29 VITALS
BODY MASS INDEX: 30.18 KG/M2 | SYSTOLIC BLOOD PRESSURE: 130 MMHG | HEART RATE: 80 BPM | WEIGHT: 164 LBS | DIASTOLIC BLOOD PRESSURE: 70 MMHG | HEIGHT: 62 IN | RESPIRATION RATE: 16 BRPM | TEMPERATURE: 98.3 F

## 2019-03-29 DIAGNOSIS — J44.9 MODERATE COPD (CHRONIC OBSTRUCTIVE PULMONARY DISEASE) (HCC): ICD-10-CM

## 2019-03-29 DIAGNOSIS — K21.00 GASTROESOPHAGEAL REFLUX DISEASE WITH ESOPHAGITIS: ICD-10-CM

## 2019-03-29 DIAGNOSIS — M17.11 PRIMARY OSTEOARTHRITIS OF RIGHT KNEE: ICD-10-CM

## 2019-03-29 DIAGNOSIS — I25.10 ATHEROSCLEROSIS OF CORONARY ARTERY OF NATIVE HEART WITHOUT ANGINA PECTORIS, UNSPECIFIED VESSEL OR LESION TYPE: ICD-10-CM

## 2019-03-29 DIAGNOSIS — J32.9 RECURRENT SINUSITIS: Primary | ICD-10-CM

## 2019-03-29 DIAGNOSIS — F41.8 DEPRESSION WITH ANXIETY: ICD-10-CM

## 2019-03-29 PROCEDURE — 3008F BODY MASS INDEX DOCD: CPT | Performed by: FAMILY MEDICINE

## 2019-03-29 PROCEDURE — 99214 OFFICE O/P EST MOD 30 MIN: CPT | Performed by: FAMILY MEDICINE

## 2019-03-29 RX ORDER — CEFPROZIL 500 MG/1
500 TABLET, FILM COATED ORAL 2 TIMES DAILY
Qty: 14 TABLET | Refills: 0 | Status: SHIPPED | OUTPATIENT
Start: 2019-03-29 | End: 2019-04-05

## 2019-03-29 RX ORDER — METHYLPREDNISOLONE 4 MG/1
TABLET ORAL
Qty: 21 EACH | Refills: 0 | Status: SHIPPED | OUTPATIENT
Start: 2019-03-29 | End: 2019-05-14

## 2019-03-29 NOTE — PROGRESS NOTES
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  ([address] )     Patient remains on Spiriva  She denies symptoms of dyspnea or colored mucus production  Patient remains under care of [facility] pulmonology  Pending CT chest in 2019 for surveillance of 8 mm left upper lobe lung nodule         Relevant Medications    methylPREDNISolone 4 MG tablet therapy pack       Cardiovascular and Mediastinum    Coronary atherosclerosis     Continue regimen of aspirin, Zetia and Crestor  LDL is 75  Patient remains on verapamil  mg daily for hypertension  Other    Depression with anxiety     Continue Wellbutrin  mg daily           Other Visit Diagnoses     Recurrent sinusitis    -  Primary    Relevant Medications    cefprosil (CEFZIL) 500 MG tablet    methylPREDNISolone 4 MG tablet therapy pack    Other Relevant Orders    CT sinus wo contrast    Ambulatory Referral to Otolaryngology    Primary osteoarthritis of right knee        Relevant Orders    Ambulatory referral to Orthopedic Surgery    XR knee 3 vw right non injury       Patient presents for follow-up of chronic medical conditions and evaluation of recurrent sinusitis  Will treat current symptoms with Cefzil and Medrol Dosepak  I advised patient to proceed with CT sinuses and schedule evaluation with ENT  COPD:  Patient remains on Spiriva, pending CT chest in April  She is following up with [facility] pulmonology  Patient is still unfortunately smoking, trying to quit    She will continue same daily medications for chronic medical conditions as outlined above  Referral to [facility] Orthopedic surgery for evaluation of right knee DJD  Follow-up 3 months and pending diagnostic results  There are no Patient Instructions on file for this visit  [employer] software was used to dictate this note  It may contain errors with dictating incorrect words/spelling  Please contact provider directly with any questions  Chief Complaint     Chief Complaint   Patient presents with    Cough    Sinusitis    Sore Throat    Nasal Congestion    Dizziness       History of Present Illness     Patient presents for evaluation of recurrent sinus symptoms and follow-up of chronic medical conditions  Results of recent blood work discussed  Patient remains on medications for hypertension, hyperlipidemia, acid reflux disease  Recent blood work reveals normal CBC, CMP, TSH and lipid panel  LDL was 75, fasting blood sugar is 108, slightly above patient's baseline  She admits to poor dietary habits and weight gain within past few months  Patient was seen for evaluation of sinus infection few weeks ago  At that time she presented with significant sinus pressure  Patient was treated with Augmentin and and prednisone  She spent few weeks and [place] afterwards and report improvement/resolution of her symptoms  She returned back from [place] in mid March and has noted recurrence of sinus symptoms few days after arrival   Currently patient is complaining of dry throat, sinus pressure which is worse on the right side  Intermittent symptoms of lightheadedness, chronic coarseness  Cough is minimal   No chest tightness, fever or shortness of breath  Patient denies headaches  She is blowing some mucus which is white in color  No fever  Symptoms of acid reflux are well controlled on Protonix 40 mg daily   Intermittent pain and swelling in right knee  Pain is worse with ambulation and at night  No injury or fall        Cough   Associated symptoms include a sore throat  Sinusitis   Associated symptoms include congestion, coughing and a sore throat  Sore Throat    Associated symptoms include congestion and coughing  Dizziness   Associated symptoms include arthralgias ( right knee pain), congestion, coughing and a sore throat  Review of Systems   Review of Systems   Constitutional: Negative  HENT: Positive for congestion, sinus pain, sore throat and voice change  Eyes: Negative  Respiratory: Positive for cough  Cardiovascular: Negative  Gastrointestinal: Negative  Endocrine: Negative  Genitourinary: Negative  Musculoskeletal: Positive for arthralgias ( right knee pain) and back pain (Chronic back spasms, no new complaints today)  Skin: Negative  Allergic/Immunologic: Negative  Neurological: Positive for dizziness  Hematological: Negative  Psychiatric/Behavioral: Negative  Active Problem List     Patient Active Problem List   Diagnosis    Coronary atherosclerosis    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  [facility]    DDD (degenerative disc disease), lumbar    Depression with anxiety    Esophageal reflux    Gallbladder polyp    Hyperlipidemia    Low back pain    Myofascial pain syndrome, cervical    Osteopenia    Solitary pulmonary nodule    Vitamin D deficiency    Tobacco abuse       Past Medical History:  Past Medical History:   Diagnosis Date    Hypercholesterolemia     Iliotibial band tendonitis     Last Assessed: 6/5/2015    Migraine     Prinzmetal angina [facility]     Last Assessed: 5/23/2017    Sciatica     Last Assessed: 11/10/2014    Tobacco abuse 4/19/2018    Vertigo     Last Assessed: 4/12/2017       Past Surgical History:  Past Surgical History:   Procedure Laterality Date    COLONOSCOPY  06/2011    Complete: Dr Julian Faye - due in 5 years, Colonoscopy Nov 2017, Diverticulosis, 5 mm polyp, Due in 5 years    ESOPHAGOGASTRODUODENOSCOPY      Diagnostic;  Last Assessed: 7/11/2014    FLEXOR TENDON REPAIR      left finger    HYSTERECTOMY      @ [address] 33    SHOULDER SURGERY      TOTAL ABDOMINAL HYSTERECTOMY W/ BILATERAL SALPINGOOPHORECTOMY      endometriosis;  Last Assessed: 5/4/2015       Family History:  Family History   Problem Relation Age of Onset    Lung cancer Mother     Other Father         Dyslipidemia    Diabetes Sister     Hypertension Family     Breast cancer Maternal Grandmother 48       Social History:  Social History     Socioeconomic History    Marital status: /Civil Union     Spouse name: Not on file    Number of children: Not on file    Years of education: Not on file    Highest education level: Not on file   Occupational History    Not on file   Social Needs    Financial resource strain: Not on file    Food insecurity:     Worry: Not on file     Inability: Not on file    Transportation needs:     Medical: Not on file     Non-medical: Not on file   Tobacco Use    Smoking status: Current Every Day Smoker     Packs/day: 1 00     Years: 48 00     Pack years: 48 00    Smokeless tobacco: Never Used   Substance and Sexual Activity    Alcohol use: No    Drug use: No    Sexual activity: Not on file   Lifestyle    Physical activity:     Days per week: Not on file     Minutes per session: Not on file    Stress: Not on file   Relationships    Social connections:     Talks on phone: Not on file     Gets together: Not on file     Attends Buddhist service: Not on file     Active member of club or organization: Not on file     Attends meetings of clubs or organizations: Not on file     Relationship status: Not on file    Intimate partner violence:     Fear of current or ex partner: Not on file     Emotionally abused: Not on file     Physically abused: Not on file     Forced sexual activity: Not on file   Other Topics Concern    Not on file   Social History Narrative    Working full time       Objective     Vitals:    03/29/19 1437   BP: 130/70   BP Location: Left arm   Patient Position: Sitting   Cuff Size: Adult   Pulse: 80   Resp: 16   Temp: 98 3 °F (36 8 °C)   TempSrc: Tympanic   Weight: 74 4 kg (164 lb)   Height: 5' 2" (1 575 m)     Wt Readings from Last 3 Encounters:   03/29/19 74 4 kg (164 lb)   03/04/19 73 8 kg (162 lb 12 8 oz)   12/27/18 68 5 kg (151 lb)       Physical Exam   Constitutional: She is oriented to person, place, and time  She appears well-developed and well-nourished  HENT:   Head: Normocephalic and atraumatic  Right Ear: Tympanic membrane normal    Left Ear: Tympanic membrane normal    Nose: Mucosal edema present  Mouth/Throat: Posterior oropharyngeal erythema present  No oropharyngeal exudate  Bilateral ethmoid and maxillary sinus pressure  Erythema of oropharynx, copious postnasal drip   Eyes: Conjunctivae are normal    Neck: Neck supple  Cardiovascular: Normal rate, regular rhythm and normal heart sounds  Pulmonary/Chest: Effort normal and breath sounds normal  No respiratory distress  She has no wheezes  She has no rales  Musculoskeletal:   Right knee:  Mild edema, mild warmth, no discoloration  No calf tenderness or edema  Tenderness along lateral surface and with patellar manipulation  Lymphadenopathy:     She has cervical adenopathy (Submandibular)  Neurological: She is alert and oriented to person, place, and time  She has normal reflexes  No cranial nerve deficit  Psychiatric: She has a normal mood and affect  Her behavior is normal    Nursing note and vitals reviewed        Pertinent Laboratory/Diagnostic Studies:  Lab Results   Component Value Date    BUN 11 03/20/2019    CREATININE 0 81 01/18/2018    CALCIUM 9 2 03/20/2019     01/18/2018    K 4 0 03/20/2019    CO2 28 03/20/2019     03/20/2019     Lab Results   Component Value Date    ALT 19 03/20/2019    AST 16 03/20/2019    ALKPHOS 100 03/20/2019    BILITOT 0 4 01/18/2018       Lab Results   Component Value Date    WBC 8 4 03/20/2019    HGB 16 1 (H) 03/20/2019    HCT 47 3 (H) 03/20/2019    MCV 88 9 03/20/2019     03/20/2019       Lab Results   Component Value Date    TSH 1 60 03/20/2019       Lab Results   Component Value Date    CHOL 163 10/31/2017     Lab Results   Component Value Date    TRIG 148 03/20/2019     Lab Results   Component Value Date    HDL 41 (L) 03/20/2019     No results found for: [facility]  Lab Results   Component Value Date    HGBA1C 5 7 (H) 11/05/2012       Results for orders placed or performed in visit on 03/20/19   Lipid panel   Result Value Ref Range    Total Cholesterol 139 <200 mg/dL    HDL 41 (L) >50 mg/dL    Triglycerides 148 <150 mg/dL    LDL Direct 75 mg/dL (calc)    Chol HDLC Ratio 3 4 <5 0 (calc)    Non-HDL Cholesterol 98 <130 mg/dL (calc)   Comprehensive metabolic panel   Result Value Ref Range    Glucose, Random 108 (H) 65 - 99 mg/dL    BUN 11 7 - 25 mg/dL    Creatinine 0 85 0 50 - 1 05 mg/dL    eGFR Non  75 > OR = 60 mL/min/1 73m2    eGFR  87 > OR = 60 mL/min/1 73m2    SL AMB BUN/CREATININE RATIO NOT APPLICABLE 6 - 22 (calc)    Sodium 140 135 - 146 mmol/L    Potassium 4 0 3 5 - 5 3 mmol/L    Chloride 105 98 - 110 mmol/L    CO2 28 20 - 32 mmol/L    SL AMB CALCIUM 9 2 8 6 - 10 4 mg/dL    Protein, Total 6 7 6 1 - 8 1 g/dL    Albumin 4 1 3 6 - 5 1 g/dL    Globulin 2 6 1 9 - 3 7 g/dL (calc)    Albumin/Globulin Ratio 1 6 1 0 - 2 5 (calc)    TOTAL BILIRUBIN 0 4 0 2 - 1 2 mg/dL    Alkaline Phosphatase 100 33 - 130 U/L    AST 16 10 - 35 U/L    ALT 19 6 - 29 U/L   CBC and differential   Result Value Ref Range    White Blood Cell Count 8 4 3 8 - 10 8 Thousand/uL    Red Blood Cell Count 5 32 (H) 3 80 - 5 10 Million/uL    Hemoglobin 16 1 (H) 11 7 - 15 5 g/dL    HCT 47 3 (H) 35 0 - 45 0 %    MCV 88 9 80 0 - 100 0 fL    MCH 30 3 27 0 - 33 0 pg    MCHC 34 0 32 0 - 36 0 g/dL    RDW 12 6 11 0 - 15 0 %    Platelet Count 389 909 - 400 Thousand/uL    SL AMB MPV 12 5 7 5 - 12 5 fL    Neutrophils (Absolute) 5,594 1,500 - 7,800 cells/uL    Lymphocytes (Absolute) 1,991 850 - 3,900 cells/uL    Monocytes (Absolute) 596 200 - 950 cells/uL    Eosinophils (Absolute) 176 15 - 500 cells/uL    Basophils ABS 42 0 - 200 cells/uL    Neutrophils 66 6 %    Lymphocytes 23 7 %    Monocytes 7 1 %    Eosinophils 2 1 %    Basophils PCT 0 5 %   TSH, 3rd generation   Result Value Ref Range    TSH 1 60 0 40 - 4 50 mIU/L       Orders Placed This Encounter   Procedures    CT sinus wo contrast    XR knee 3 vw right non injury    Ambulatory Referral to Otolaryngology    Ambulatory referral to Orthopedic Surgery       ALLERGIES:  Allergies   Allergen Reactions    Darvon [Propoxyphene] Itching       Current Medications     Current Outpatient Medications   Medication Sig Dispense Refill    albuterol (PROVENTIL HFA,VENTOLIN HFA) 90 mcg/act inhaler Inhale 2 puffs every 4 (four) hours as needed for wheezing or shortness of breath (cough) 1 Inhaler 3    aspirin (ECOTRIN LOW STRENGTH) 81 mg EC tablet Take 1 tablet by mouth daily      buPROPion (WELLBUTRIN XL) 150 mg 24 hr tablet TAKE 1 TABLET EVERY DAY 90 tablet 3    cyclobenzaprine (FLEXERIL) 10 mg tablet Take 1 tablet at bedtime as needed for muscle spasm 90 tablet 1    ezetimibe (ZETIA) 10 mg tablet TAKE 1 TABLET EVERY DAY 90 tablet 3    famotidine (PEPCID) 40 MG tablet Take 1 tablet by mouth      Flaxseed, Linseed, (FLAX SEED OIL) 1300 MG CAPS Take by mouth      nitroglycerin (NITROSTAT) 0 4 mg SL tablet Place 1 tablet under the tongue every 5 (five) minutes as needed      pantoprazole (PROTONIX) 40 mg tablet Take 40 mg by mouth daily      rosuvastatin (CRESTOR) 20 MG tablet TAKE 1 TABLET DAILY 90 tablet 3    Tiotropium Bromide Monohydrate (SPIRIVA RESPIMAT) 2 5 MCG/ACT AERS Inhale 2 puffs daily      verapamil (CALAN-SR) 240 mg CR tablet TAKE 1 TABLET DAILY 90 tablet 3    cefprosil (CEFZIL) 500 MG tablet Take 1 tablet (500 mg total) by mouth 2 (two) times a day for 7 days 14 tablet 0    methylPREDNISolone 4 MG tablet therapy pack Use as directed on package 21 each 0     No current facility-administered medications for this visit            Health Maintenance     Health Maintenance   Topic Date Due    Hepatitis C Screening  1960    BMI: Followup Plan  05/17/1977    DTaP,Tdap,and Td Vaccines (1 - Tdap) 09/05/2019 (Originally 5/17/1980)    MAMMOGRAM  12/27/2019    BMI: Adult  03/29/2020    CRC Screening: Colonoscopy  11/30/2022    INFLUENZA VACCINE  Completed    Pneumococcal PPSV23 Medium Risk Adult  Completed    HEPATITIS B VACCINES  Aged Out     Immunization History   Administered Date(s) Administered    INFLUENZA 08/20/2015, 11/02/2017    Influenza Quadrivalent Preservative Free 3 years and older IM 08/17/2016, 11/02/2017    Influenza TIV (IM) 1960, 09/01/2011, 01/24/2013    Influenza, recombinant, quadrivalent,injectable, preservative free 10/30/2018    Pneumococcal Polysaccharide PPV23 11/15/2006, 11/02/2017       Clem Jones MD

## 2019-03-29 NOTE — TELEPHONE ENCOUNTER
Patient was to call Dr Lala Villeda with this information   The medication is Pantoprazole SOD DR 40 mg

## 2019-03-31 RX ORDER — PANTOPRAZOLE SODIUM 40 MG/1
40 TABLET, DELAYED RELEASE ORAL
COMMUNITY
End: 2020-01-09

## 2019-03-31 NOTE — ASSESSMENT & PLAN NOTE
Patient remains on Spiriva  She denies symptoms of dyspnea or colored mucus production  Patient remains under care of [facility] pulmonology    Pending CT chest in April 2019 for surveillance of 8 mm left upper lobe lung nodule

## 2019-03-31 NOTE — ASSESSMENT & PLAN NOTE
Continue regimen of aspirin, Zetia and Crestor  LDL is 75  Patient remains on verapamil  mg daily for hypertension

## 2019-04-01 ENCOUNTER — HOSPITAL ENCOUNTER (OUTPATIENT)
Dept: RADIOLOGY | Facility: HOSPITAL | Age: 59
Discharge: HOME | End: 2019-04-01
Attending: INTERNAL MEDICINE
Payer: COMMERCIAL

## 2019-04-01 DIAGNOSIS — R91.1 SOLITARY PULMONARY NODULE: ICD-10-CM

## 2019-04-01 PROCEDURE — 71250 CT THORAX DX C-: CPT

## 2019-04-15 ENCOUNTER — HOSPITAL ENCOUNTER (OUTPATIENT)
Dept: CT IMAGING | Facility: HOSPITAL | Age: 59
Discharge: HOME | End: 2019-04-15
Payer: COMMERCIAL

## 2019-04-15 DIAGNOSIS — J32.9 RECURRENT SINUSITIS: ICD-10-CM

## 2019-04-15 PROCEDURE — 70486 CT MAXILLOFACIAL W/O DYE: CPT

## 2019-04-18 ENCOUNTER — TELEPHONE (OUTPATIENT)
Dept: FAMILY MEDICINE CLINIC | Facility: CLINIC | Age: 59
End: 2019-04-18

## 2019-04-23 PROBLEM — R06.83 SNORING: Status: ACTIVE | Noted: 2019-04-23

## 2019-04-23 PROBLEM — J38.3 VOCAL CORD LEUKOPLAKIA: Status: ACTIVE | Noted: 2019-04-23

## 2019-04-23 PROBLEM — R44.8 FACIAL PRESSURE: Status: ACTIVE | Noted: 2019-04-23

## 2019-04-23 PROBLEM — R09.81 NASAL CONGESTION: Status: ACTIVE | Noted: 2019-04-23

## 2019-04-23 PROBLEM — H90.3 SENSORINEURAL HEARING LOSS (SNHL), BILATERAL: Status: ACTIVE | Noted: 2019-04-23

## 2019-04-23 PROBLEM — R13.12 OROPHARYNGEAL DYSPHAGIA: Status: ACTIVE | Noted: 2019-04-23

## 2019-04-23 PROBLEM — R49.0 HOARSENESS: Status: ACTIVE | Noted: 2019-04-23

## 2019-04-23 PROBLEM — J38.1 REINKE'S EDEMA OF VOCAL FOLDS: Status: ACTIVE | Noted: 2019-04-23

## 2019-05-08 ENCOUNTER — LAB (OUTPATIENT)
Dept: LAB | Facility: CLINIC | Age: 59
End: 2019-05-08
Payer: COMMERCIAL

## 2019-05-08 ENCOUNTER — TRANSCRIBE ORDERS (OUTPATIENT)
Dept: LAB | Facility: CLINIC | Age: 59
End: 2019-05-08

## 2019-05-08 DIAGNOSIS — R13.12 OROPHARYNGEAL DYSPHAGIA: Primary | ICD-10-CM

## 2019-05-08 DIAGNOSIS — R13.12 OROPHARYNGEAL DYSPHAGIA: ICD-10-CM

## 2019-05-08 LAB
ANION GAP SERPL CALCULATED.3IONS-SCNC: 9 MMOL/L (ref 4–13)
ATRIAL RATE: 75 BPM
BASOPHILS # BLD AUTO: 0.03 THOUSANDS/ΜL (ref 0–0.1)
BASOPHILS NFR BLD AUTO: 0 % (ref 0–1)
BUN SERPL-MCNC: 14 MG/DL (ref 5–25)
CALCIUM SERPL-MCNC: 9.4 MG/DL (ref 8.3–10.1)
CHLORIDE SERPL-SCNC: 105 MMOL/L (ref 100–108)
CO2 SERPL-SCNC: 27 MMOL/L (ref 21–32)
CREAT SERPL-MCNC: 0.84 MG/DL (ref 0.6–1.3)
EOSINOPHIL # BLD AUTO: 0.11 THOUSAND/ΜL (ref 0–0.61)
EOSINOPHIL NFR BLD AUTO: 2 % (ref 0–6)
ERYTHROCYTE [DISTWIDTH] IN BLOOD BY AUTOMATED COUNT: 12.6 % (ref 11.6–15.1)
GFR SERPL CREATININE-BSD FRML MDRD: 76 ML/MIN/1.73SQ M
GLUCOSE SERPL-MCNC: 94 MG/DL (ref 65–140)
HCT VFR BLD AUTO: 47.6 % (ref 34.8–46.1)
HGB BLD-MCNC: 16.1 G/DL (ref 11.5–15.4)
IMM GRANULOCYTES # BLD AUTO: 0.03 THOUSAND/UL (ref 0–0.2)
IMM GRANULOCYTES NFR BLD AUTO: 0 % (ref 0–2)
LYMPHOCYTES # BLD AUTO: 1.89 THOUSANDS/ΜL (ref 0.6–4.47)
LYMPHOCYTES NFR BLD AUTO: 25 % (ref 14–44)
MCH RBC QN AUTO: 30.1 PG (ref 26.8–34.3)
MCHC RBC AUTO-ENTMCNC: 33.8 G/DL (ref 31.4–37.4)
MCV RBC AUTO: 89 FL (ref 82–98)
MONOCYTES # BLD AUTO: 0.6 THOUSAND/ΜL (ref 0.17–1.22)
MONOCYTES NFR BLD AUTO: 8 % (ref 4–12)
NEUTROPHILS # BLD AUTO: 4.86 THOUSANDS/ΜL (ref 1.85–7.62)
NEUTS SEG NFR BLD AUTO: 65 % (ref 43–75)
NRBC BLD AUTO-RTO: 0 /100 WBCS
P AXIS: 60 DEGREES
PLATELET # BLD AUTO: 176 THOUSANDS/UL (ref 149–390)
PMV BLD AUTO: 13.4 FL (ref 8.9–12.7)
POTASSIUM SERPL-SCNC: 4.2 MMOL/L (ref 3.5–5.3)
PR INTERVAL: 162 MS
QRS AXIS: 17 DEGREES
QRSD INTERVAL: 94 MS
QT INTERVAL: 392 MS
QTC INTERVAL: 437 MS
RBC # BLD AUTO: 5.34 MILLION/UL (ref 3.81–5.12)
SODIUM SERPL-SCNC: 141 MMOL/L (ref 136–145)
T WAVE AXIS: 76 DEGREES
VENTRICULAR RATE: 75 BPM
WBC # BLD AUTO: 7.52 THOUSAND/UL (ref 4.31–10.16)

## 2019-05-08 PROCEDURE — 36415 COLL VENOUS BLD VENIPUNCTURE: CPT

## 2019-05-08 PROCEDURE — 93010 ELECTROCARDIOGRAM REPORT: CPT | Performed by: INTERNAL MEDICINE

## 2019-05-08 PROCEDURE — 85025 COMPLETE CBC W/AUTO DIFF WBC: CPT

## 2019-05-08 PROCEDURE — 93005 ELECTROCARDIOGRAM TRACING: CPT

## 2019-05-08 PROCEDURE — 80048 BASIC METABOLIC PNL TOTAL CA: CPT

## 2019-05-14 RX ORDER — ROSUVASTATIN CALCIUM 20 MG/1
20 TABLET, COATED ORAL DAILY
COMMUNITY
End: 2019-10-16 | Stop reason: SDUPTHER

## 2019-05-15 ENCOUNTER — ANESTHESIA EVENT (OUTPATIENT)
Dept: PERIOP | Facility: HOSPITAL | Age: 59
End: 2019-05-15
Payer: COMMERCIAL

## 2019-05-16 ENCOUNTER — ANESTHESIA (OUTPATIENT)
Dept: PERIOP | Facility: HOSPITAL | Age: 59
End: 2019-05-16
Payer: COMMERCIAL

## 2019-05-16 ENCOUNTER — HOSPITAL ENCOUNTER (OUTPATIENT)
Facility: HOSPITAL | Age: 59
Setting detail: OUTPATIENT SURGERY
Discharge: HOME | End: 2019-05-16
Attending: OTOLARYNGOLOGY | Admitting: OTOLARYNGOLOGY
Payer: COMMERCIAL

## 2019-05-16 VITALS
BODY MASS INDEX: 29.26 KG/M2 | RESPIRATION RATE: 18 BRPM | WEIGHT: 159 LBS | DIASTOLIC BLOOD PRESSURE: 73 MMHG | SYSTOLIC BLOOD PRESSURE: 115 MMHG | TEMPERATURE: 97.6 F | HEIGHT: 62 IN | HEART RATE: 80 BPM | OXYGEN SATURATION: 94 %

## 2019-05-16 DIAGNOSIS — R13.12 OROPHARYNGEAL DYSPHAGIA: ICD-10-CM

## 2019-05-16 PROCEDURE — 31622 DX BRONCHOSCOPE/WASH: CPT | Performed by: OTOLARYNGOLOGY

## 2019-05-16 PROCEDURE — 31535 LARYNGOSCOPY W/BIOPSY: CPT | Performed by: OTOLARYNGOLOGY

## 2019-05-16 PROCEDURE — 88305 TISSUE EXAM BY PATHOLOGIST: CPT | Performed by: PATHOLOGY

## 2019-05-16 RX ORDER — PROPOFOL 10 MG/ML
INJECTION, EMULSION INTRAVENOUS AS NEEDED
Status: DISCONTINUED | OUTPATIENT
Start: 2019-05-16 | End: 2019-05-16 | Stop reason: SURG

## 2019-05-16 RX ORDER — OXYCODONE HCL 5 MG/5 ML
5 SOLUTION, ORAL ORAL ONCE
Status: DISCONTINUED | OUTPATIENT
Start: 2019-05-16 | End: 2019-05-16 | Stop reason: HOSPADM

## 2019-05-16 RX ORDER — LIDOCAINE HYDROCHLORIDE 10 MG/ML
0.5 INJECTION, SOLUTION EPIDURAL; INFILTRATION; INTRACAUDAL; PERINEURAL ONCE AS NEEDED
Status: DISCONTINUED | OUTPATIENT
Start: 2019-05-16 | End: 2019-05-16 | Stop reason: HOSPADM

## 2019-05-16 RX ORDER — MAGNESIUM HYDROXIDE 1200 MG/15ML
LIQUID ORAL AS NEEDED
Status: DISCONTINUED | OUTPATIENT
Start: 2019-05-16 | End: 2019-05-16 | Stop reason: HOSPADM

## 2019-05-16 RX ORDER — SODIUM CHLORIDE, SODIUM LACTATE, POTASSIUM CHLORIDE, CALCIUM CHLORIDE 600; 310; 30; 20 MG/100ML; MG/100ML; MG/100ML; MG/100ML
125 INJECTION, SOLUTION INTRAVENOUS CONTINUOUS
Status: DISCONTINUED | OUTPATIENT
Start: 2019-05-16 | End: 2019-05-16 | Stop reason: HOSPADM

## 2019-05-16 RX ORDER — MIDAZOLAM HYDROCHLORIDE 1 MG/ML
INJECTION INTRAMUSCULAR; INTRAVENOUS AS NEEDED
Status: DISCONTINUED | OUTPATIENT
Start: 2019-05-16 | End: 2019-05-16 | Stop reason: SURG

## 2019-05-16 RX ORDER — NEOSTIGMINE METHYLSULFATE 1 MG/ML
INJECTION INTRAVENOUS AS NEEDED
Status: DISCONTINUED | OUTPATIENT
Start: 2019-05-16 | End: 2019-05-16 | Stop reason: SURG

## 2019-05-16 RX ORDER — GLYCOPYRROLATE 0.2 MG/ML
INJECTION INTRAMUSCULAR; INTRAVENOUS AS NEEDED
Status: DISCONTINUED | OUTPATIENT
Start: 2019-05-16 | End: 2019-05-16 | Stop reason: SURG

## 2019-05-16 RX ORDER — ONDANSETRON 2 MG/ML
4 INJECTION INTRAMUSCULAR; INTRAVENOUS EVERY 4 HOURS PRN
Status: DISCONTINUED | OUTPATIENT
Start: 2019-05-16 | End: 2019-05-16 | Stop reason: HOSPADM

## 2019-05-16 RX ORDER — MEPERIDINE HYDROCHLORIDE 25 MG/ML
12.5 INJECTION INTRAMUSCULAR; INTRAVENOUS; SUBCUTANEOUS
Status: DISCONTINUED | OUTPATIENT
Start: 2019-05-16 | End: 2019-05-16 | Stop reason: HOSPADM

## 2019-05-16 RX ORDER — FENTANYL CITRATE 50 UG/ML
INJECTION, SOLUTION INTRAMUSCULAR; INTRAVENOUS AS NEEDED
Status: DISCONTINUED | OUTPATIENT
Start: 2019-05-16 | End: 2019-05-16 | Stop reason: SURG

## 2019-05-16 RX ORDER — DEXAMETHASONE SODIUM PHOSPHATE 4 MG/ML
INJECTION, SOLUTION INTRA-ARTICULAR; INTRALESIONAL; INTRAMUSCULAR; INTRAVENOUS; SOFT TISSUE AS NEEDED
Status: DISCONTINUED | OUTPATIENT
Start: 2019-05-16 | End: 2019-05-16 | Stop reason: SURG

## 2019-05-16 RX ORDER — ACETAMINOPHEN 160 MG/5ML
650 SUSPENSION, ORAL (FINAL DOSE FORM) ORAL ONCE
Status: DISCONTINUED | OUTPATIENT
Start: 2019-05-16 | End: 2019-05-16 | Stop reason: HOSPADM

## 2019-05-16 RX ORDER — OXYMETAZOLINE HYDROCHLORIDE 0.05 G/100ML
SPRAY NASAL AS NEEDED
Status: DISCONTINUED | OUTPATIENT
Start: 2019-05-16 | End: 2019-05-16 | Stop reason: HOSPADM

## 2019-05-16 RX ORDER — FENTANYL CITRATE/PF 50 MCG/ML
25 SYRINGE (ML) INJECTION
Status: DISCONTINUED | OUTPATIENT
Start: 2019-05-16 | End: 2019-05-16 | Stop reason: HOSPADM

## 2019-05-16 RX ORDER — ONDANSETRON 2 MG/ML
INJECTION INTRAMUSCULAR; INTRAVENOUS AS NEEDED
Status: DISCONTINUED | OUTPATIENT
Start: 2019-05-16 | End: 2019-05-16 | Stop reason: SURG

## 2019-05-16 RX ORDER — ROCURONIUM BROMIDE 10 MG/ML
INJECTION, SOLUTION INTRAVENOUS AS NEEDED
Status: DISCONTINUED | OUTPATIENT
Start: 2019-05-16 | End: 2019-05-16 | Stop reason: SURG

## 2019-05-16 RX ORDER — HYDROMORPHONE HCL/PF 1 MG/ML
0.2 SYRINGE (ML) INJECTION
Status: DISCONTINUED | OUTPATIENT
Start: 2019-05-16 | End: 2019-05-16 | Stop reason: HOSPADM

## 2019-05-16 RX ADMIN — ROCURONIUM BROMIDE 30 MG: 10 INJECTION, SOLUTION INTRAVENOUS at 09:29

## 2019-05-16 RX ADMIN — FENTANYL CITRATE 50 MCG: 50 INJECTION INTRAMUSCULAR; INTRAVENOUS at 09:29

## 2019-05-16 RX ADMIN — SODIUM CHLORIDE, SODIUM LACTATE, POTASSIUM CHLORIDE, AND CALCIUM CHLORIDE: .6; .31; .03; .02 INJECTION, SOLUTION INTRAVENOUS at 09:16

## 2019-05-16 RX ADMIN — ONDANSETRON 4 MG: 2 INJECTION INTRAMUSCULAR; INTRAVENOUS at 09:19

## 2019-05-16 RX ADMIN — LIDOCAINE HYDROCHLORIDE 70 MG: 20 INJECTION, SOLUTION INTRAVENOUS at 09:29

## 2019-05-16 RX ADMIN — SUGAMMADEX 200 MG: 100 INJECTION, SOLUTION INTRAVENOUS at 10:06

## 2019-05-16 RX ADMIN — MIDAZOLAM HYDROCHLORIDE 2 MG: 1 INJECTION, SOLUTION INTRAMUSCULAR; INTRAVENOUS at 09:19

## 2019-05-16 RX ADMIN — FENTANYL CITRATE 25 MCG: 50 INJECTION, SOLUTION INTRAMUSCULAR; INTRAVENOUS at 10:25

## 2019-05-16 RX ADMIN — GLYCOPYRROLATE 0.6 MG: 0.2 INJECTION, SOLUTION INTRAMUSCULAR; INTRAVENOUS at 09:56

## 2019-05-16 RX ADMIN — DEXAMETHASONE SODIUM PHOSPHATE 8 MG: 4 INJECTION, SOLUTION INTRAMUSCULAR; INTRAVENOUS at 09:38

## 2019-05-16 RX ADMIN — NEOSTIGMINE METHYLSULFATE 4 MG: 1 INJECTION INTRAVENOUS at 09:56

## 2019-05-16 RX ADMIN — FENTANYL CITRATE 25 MCG: 50 INJECTION, SOLUTION INTRAMUSCULAR; INTRAVENOUS at 10:18

## 2019-05-16 RX ADMIN — PROPOFOL 180 MG: 10 INJECTION, EMULSION INTRAVENOUS at 09:29

## 2019-05-24 ENCOUNTER — OFFICE VISIT (OUTPATIENT)
Dept: PULMONOLOGY | Facility: CLINIC | Age: 59
End: 2019-05-24
Payer: COMMERCIAL

## 2019-05-24 VITALS
DIASTOLIC BLOOD PRESSURE: 76 MMHG | SYSTOLIC BLOOD PRESSURE: 138 MMHG | BODY MASS INDEX: 29.08 KG/M2 | HEIGHT: 62 IN | TEMPERATURE: 97.7 F | HEART RATE: 84 BPM | OXYGEN SATURATION: 92 % | WEIGHT: 158 LBS

## 2019-05-24 DIAGNOSIS — J44.9 MODERATE COPD (CHRONIC OBSTRUCTIVE PULMONARY DISEASE) (HCC): Primary | ICD-10-CM

## 2019-05-24 DIAGNOSIS — R91.1 SOLITARY PULMONARY NODULE: ICD-10-CM

## 2019-05-24 DIAGNOSIS — R09.81 NASAL CONGESTION: ICD-10-CM

## 2019-05-24 DIAGNOSIS — Z72.0 TOBACCO ABUSE: ICD-10-CM

## 2019-05-24 PROCEDURE — 99214 OFFICE O/P EST MOD 30 MIN: CPT | Performed by: NURSE PRACTITIONER

## 2019-05-24 RX ORDER — NICOTINE 21 MG/24HR
1 PATCH, TRANSDERMAL 24 HOURS TRANSDERMAL EVERY 24 HOURS
Qty: 28 PATCH | Refills: 0 | Status: SHIPPED | OUTPATIENT
Start: 2019-05-24 | End: 2021-04-30 | Stop reason: SDUPTHER

## 2019-05-24 RX ORDER — PREDNISONE 20 MG/1
40 TABLET ORAL DAILY
Qty: 10 TABLET | Refills: 0 | Status: SHIPPED | OUTPATIENT
Start: 2019-05-24 | End: 2019-07-01

## 2019-05-24 RX ORDER — AMOXICILLIN AND CLAVULANATE POTASSIUM 875; 125 MG/1; MG/1
1 TABLET, FILM COATED ORAL EVERY 12 HOURS SCHEDULED
Qty: 14 TABLET | Refills: 0 | Status: SHIPPED | OUTPATIENT
Start: 2019-05-24 | End: 2019-05-31

## 2019-07-01 ENCOUNTER — OFFICE VISIT (OUTPATIENT)
Dept: FAMILY MEDICINE CLINIC | Facility: CLINIC | Age: 59
End: 2019-07-01
Payer: COMMERCIAL

## 2019-07-01 VITALS
OXYGEN SATURATION: 92 % | WEIGHT: 158.8 LBS | SYSTOLIC BLOOD PRESSURE: 132 MMHG | HEART RATE: 77 BPM | TEMPERATURE: 97 F | BODY MASS INDEX: 29.22 KG/M2 | DIASTOLIC BLOOD PRESSURE: 70 MMHG | RESPIRATION RATE: 16 BRPM | HEIGHT: 62 IN

## 2019-07-01 DIAGNOSIS — K59.00 CONSTIPATION, UNSPECIFIED CONSTIPATION TYPE: ICD-10-CM

## 2019-07-01 DIAGNOSIS — E78.5 HYPERLIPIDEMIA, UNSPECIFIED HYPERLIPIDEMIA TYPE: ICD-10-CM

## 2019-07-01 DIAGNOSIS — F41.8 DEPRESSION WITH ANXIETY: ICD-10-CM

## 2019-07-01 DIAGNOSIS — J44.9 MODERATE COPD (CHRONIC OBSTRUCTIVE PULMONARY DISEASE) (HCC): Primary | ICD-10-CM

## 2019-07-01 DIAGNOSIS — J38.1 REINKE'S EDEMA OF VOCAL FOLDS: ICD-10-CM

## 2019-07-01 DIAGNOSIS — I25.10 ATHEROSCLEROSIS OF CORONARY ARTERY OF NATIVE HEART WITHOUT ANGINA PECTORIS, UNSPECIFIED VESSEL OR LESION TYPE: ICD-10-CM

## 2019-07-01 DIAGNOSIS — Z72.0 TOBACCO ABUSE: ICD-10-CM

## 2019-07-01 DIAGNOSIS — K21.00 GASTROESOPHAGEAL REFLUX DISEASE WITH ESOPHAGITIS: ICD-10-CM

## 2019-07-01 PROCEDURE — 99214 OFFICE O/P EST MOD 30 MIN: CPT | Performed by: FAMILY MEDICINE

## 2019-07-01 RX ORDER — NICOTINE 21 MG/24HR
1 PATCH, TRANSDERMAL 24 HOURS TRANSDERMAL EVERY 24 HOURS
Qty: 30 PATCH | Refills: 0 | Status: SHIPPED | OUTPATIENT
Start: 2019-07-01 | End: 2021-04-30 | Stop reason: SDUPTHER

## 2019-07-01 NOTE — PROGRESS NOTES
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  ([address] ) - Primary     Patient reports chronic dyspnea on exertion  She follows up with [facility] pulmonology  Current med regimen includes Spiriva and p r n  Albuterol  Recent evaluation for chronic hoarseness by ENT  Will discontinue Spiriva and switch to Trelegy 1 puff daily  Tobacco Cessation Counseling: Tobacco cessation counseling and education was provided  The patient is sincerely urged to quit consumption of tobacco  She is ready to quit tobacco  The numerous health risks of tobacco consumption were discussed  Prescribed the following medications: nicotine patch  Relevant Medications    fluticasone-umeclidinium-vilanterol (TRELEGY ELLIPTA) 100-62 5-25 MCG/INH inhaler       Cardiovascular and Mediastinum    Coronary atherosclerosis     Patient denies symptoms of chest pain, palpitations, dizziness or leg edema    Continue regimen of verapamil, aspirin, Crestor and Zetia         Relevant Orders    CBC    Comprehensive metabolic panel    Lipid Panel with Direct LDL reflex    TSH, 3rd generation       Other    Depression with anxiety     Continue Wellbutrin  mg daily         Hyperlipidemia    Relevant Orders    CBC    Comprehensive metabolic panel    Lipid Panel with Direct LDL reflex    TSH, 3rd generation    Tobacco abuse    Relevant Medications    nicotine (NICODERM CQ) 21 mg/24 hr TD 24 hr patch    Hoarseness      Other Visit Diagnoses     Constipation, unspecified constipation type        Relevant Medications    linaCLOtide (Harry Castillo) 67 MCG CAPS      Patient presents for follow-up of chronic medical conditions  She has been feeling stably  Recent extensive workup by ENT for chronic coarseness  Negative biopsy  Patient admits to chronic dyspnea on exertion, she is still unfortunately smoking, trying to quit  We had long discussion about importance of tobacco cessation given her multiple comorbidities including COPD, coronary atherosclerosis, chronic reflux disease, hoarseness  Patient will restart nicotine replacement therapy  Prescription for patch 21 mg daily prescribed  She will use 21 mg patches for 1 months then 14 mg patches for 1 month and then 7 mg patches for 1-2 months  I advised patient to continue on Wellbutrin  mg daily  Recent colonoscopy, Shawn Sanchez was recommended by Gastroenterology, patient is requesting refill  COPD:  Will try her on Trelegy in place of Spiriva to improve chronic dyspnea on exertion symptoms  Upper back pain/spasms:  I advised patient to start home PT and use Flexeril at night on a daily basis for the next few weeks  Patient denies symptoms of upper extremity radiculopathy  BMI Counseling: Body mass index is 29 04 kg/m²  Discussed the patient's BMI with her  The BMI is above average  BMI counseling and education was provided to the patient  Nutrition recommendations include reducing portion sizes, decreasing overall calorie intake, 3-5 servings of fruits/vegetables daily, reducing fast food intake, consuming healthier snacks, decreasing soda and/or juice intake, moderation in carbohydrate intake, increasing intake of lean protein, reducing intake of saturated fat and trans fat and reducing intake of cholesterol  Exercise recommendations include moderate aerobic physical activity for 150 minutes/week  Follow-up and blood work 3-4 months    There are no Patient Instructions on file for this visit  Discussed with the patient and all questioned fully answered   She will call me if any problems arise  M*Modal software was used to dictate this note  It may contain errors with dictating incorrect words/spelling  Please contact provider directly with any questions  Chief Complaint     Chief Complaint   Patient presents with    Follow-up     Pt is here for 3 mos f/u       History of Present Illness     Patient presents for follow-up of chronic medical conditions  She remains on medications for hyperlipidemia, hypertension, coronary artery disease, COPD  Recent evaluation with pulmonology and CT chest  2019 revealin  Stable 8 mm left upper lobe pulmonary nodule without additional nodule identified  2  Stable emphysema and pulmonary scarring  Chronic upper more than lower back spasms  Patient has Flexeril on hand but has not been using medication daily  She denies symptoms of chest pain, palpitations or dizziness  Mild chronic shortness of breath  Current medications for COPD include Spiriva  Chronic hoarseness, patient underwent extensive evaluation with ENT  Biopsies are negative  Patient remains on regimen of Protonix and Pepcid for chronic GERD symptoms  Denies abdominal pain  History of coronary artery disease:  Patient remains on regimen of Calan, aspirin, Crestor and Zetia  She uses Wellbutrin 150 mg once a day as directed  Patient is still smoking at least half a pack of cigarettes daily, she is well aware of significant risks and has been trying to quit  Chantix has caused nausea and vivid dreams  Review of Systems   Review of Systems   Constitutional: Negative  HENT: Positive for voice change (Chronic hoarseness)  Eyes: Negative  Respiratory: Positive for shortness of breath ( chronic dyspnea on exertion)  Negative for chest tightness and wheezing  Cardiovascular: Negative for chest pain, palpitations and leg swelling  Gastrointestinal: Negative  Endocrine: Negative  Genitourinary: Negative      Musculoskeletal: Positive for arthralgias, back pain, myalgias and neck pain  Skin: Negative  Allergic/Immunologic: Negative  Neurological: Negative  Hematological: Negative  Psychiatric/Behavioral: Negative  Active Problem List     Patient Active Problem List   Diagnosis    Coronary atherosclerosis    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)    DDD (degenerative disc disease), lumbar    Depression with anxiety    Esophageal reflux    Gallbladder polyp    Hyperlipidemia    Low back pain    Myofascial pain syndrome, cervical    Osteopenia    Solitary pulmonary nodule    Vitamin D deficiency    Tobacco abuse    Hoarseness    Vocal cord leukoplakia    Facial pressure    Nasal congestion    Snoring    Sensorineural hearing loss (SNHL), bilateral    Oropharyngeal dysphagia    Suri's edema of vocal folds       Past Medical History:  Past Medical History:   Diagnosis Date    COPD (chronic obstructive pulmonary disease) ([facility] )     GERD (gastroesophageal reflux disease)     Hypercholesterolemia     Iliotibial band tendonitis     Last Assessed: 6/5/2015    Migraine     Prinzmetal angina ([facility] )     Last Assessed: 5/23/2017    Sciatica     Last Assessed: 11/10/2014    Tobacco abuse 4/19/2018    Vertigo     Last Assessed: 4/12/2017       Past Surgical History:  Past Surgical History:   Procedure Laterality Date    COLONOSCOPY  06/2011    Complete: Dr Guzman Smoke - due in 5 years, Colonoscopy Nov 2017, Diverticulosis, 5 mm polyp, Due in 5 years    ESOPHAGOGASTRODUODENOSCOPY      Diagnostic;  Last Assessed: 7/11/2014    FLEXOR TENDON REPAIR      left finger    HYSTERECTOMY      @ [facility]    RI LARYNGOSCOPY,DIRECT,DIAGNOSTIC N/A 5/16/2019    Procedure: LARYNGOSCOPY DIRECT, FLEXIBLE BRONCHOSCOPY, FLEXIBLE ESOPHAGOSCOPY;  Surgeon: Jeff Metz MD;  Location: [facility];  Service: ENT    SHOULDER ARTHROSCOPY Right     SHOULDER SURGERY      TOTAL ABDOMINAL HYSTERECTOMY W/ BILATERAL SALPINGOOPHORECTOMY      endometriosis;  Last Assessed: 5/4/2015       Family History:  Family History   Problem Relation Age of Onset    Lung cancer Mother     Other Father         Dyslipidemia    Diabetes Sister     Hypertension Family     Breast cancer Maternal Grandmother 48       Social History:  Social History     Socioeconomic History    Marital status: /Civil Union     Spouse name: Not on file    Number of children: Not on file    Years of education: Not on file    Highest education level: Not on file   Occupational History    Not on file   Social Needs    Financial resource strain: Not on file    Food insecurity:     Worry: Not on file     Inability: Not on file    Transportation needs:     Medical: Not on file     Non-medical: Not on file   Tobacco Use    Smoking status: Current Every Day Smoker     Packs/day: 0 50     Years: 48 00     Pack years: 24 00     Types: Cigarettes    Smokeless tobacco: Never Used   Substance and Sexual Activity    Alcohol use: No    Drug use: No    Sexual activity: Not on file   Lifestyle    Physical activity:     Days per week: Not on file     Minutes per session: Not on file    Stress: Not on file   Relationships    Social connections:     Talks on phone: Not on file     Gets together: Not on file     Attends Protestant service: Not on file     Active member of club or organization: Not on file     Attends meetings of clubs or organizations: Not on file     Relationship status: Not on file    Intimate partner violence:     Fear of current or ex partner: Not on file     Emotionally abused: Not on file     Physically abused: Not on file     Forced sexual activity: Not on file   Other Topics Concern    Not on file   Social History Narrative    Working full time       Objective     Vitals:    07/01/19 0921 07/01/19 0954   BP: 138/80 132/70   Pulse: 77    Resp: 16    Temp: (!) 97 °F (36 1 °C)    TempSrc: Tympanic    SpO2: 92% Weight: 72 kg (158 lb 12 8 oz)    Height: 5' 2" (1 575 m)      Wt Readings from Last 3 Encounters:   07/01/19 72 kg (158 lb 12 8 oz)   05/28/19 71 7 kg (158 lb)   05/24/19 71 7 kg (158 lb)       Physical Exam   Constitutional: She is oriented to person, place, and time  She appears well-developed and well-nourished  HENT:   Head: Normocephalic and atraumatic  Eyes: Conjunctivae are normal    Neck: Neck supple  No JVD present  Carotid bruit is not present  No thyromegaly present  Cardiovascular: Normal rate, regular rhythm and normal heart sounds  No murmur heard  Pulmonary/Chest: Effort normal and breath sounds normal  No respiratory distress  She has no wheezes  Abdominal: She exhibits no abdominal bruit  Musculoskeletal: Normal range of motion  She exhibits no edema  Trapezius spasm bilaterally, right more than left   Neurological: She is alert and oriented to person, place, and time  No cranial nerve deficit  Coordination normal    Psychiatric: She has a normal mood and affect  Her behavior is normal    Nursing note and vitals reviewed        Pertinent Laboratory/Diagnostic Studies:  Lab Results   Component Value Date    BUN 14 05/08/2019    CREATININE 0 84 05/08/2019    CALCIUM 9 4 05/08/2019     01/18/2018    K 4 2 05/08/2019    CO2 27 05/08/2019     05/08/2019     Lab Results   Component Value Date    ALT 19 03/20/2019    AST 16 03/20/2019    ALKPHOS 100 03/20/2019    BILITOT 0 4 01/18/2018       Lab Results   Component Value Date    WBC 7 52 05/08/2019    HGB 16 1 (H) 05/08/2019    HCT 47 6 (H) 05/08/2019    MCV 89 05/08/2019     05/08/2019       Lab Results   Component Value Date    TSH 1 60 03/20/2019       Lab Results   Component Value Date    CHOL 163 10/31/2017     Lab Results   Component Value Date    TRIG 148 03/20/2019     Lab Results   Component Value Date    HDL 41 (L) 03/20/2019     No results found for: [facility]  Lab Results   Component Value Date    HGBA1C 5 7 (H) 11/05/2012       Results for orders placed or performed during the hospital encounter of 05/16/19   Tissue Exam   Result Value Ref Range    Case Report       Surgical Pathology Report                         Case: [id number]                                   Authorizing Provider:  Jessa Logan MD        Collected:           05/16/2019 0950              Ordering Location:     [facility]        Received:            05/16/2019 [address] Operating Room                                                      Pathologist:           Nighat Moreno MD                                                         Specimen:    Vocal cord, right true vocal cord                                                          Final Diagnosis       A  Right true vocal cord:  - Mild squamous dysplasia  - Edema, stromal degeneration & hemorrhage in polypoid squamous mucosa, suggestive of laryngeal nodule (aka nam's nodule)  - No evidence of malignancy  Additional Information       All controls performed with the immunohistochemical stains reported above reacted appropriately  These tests were developed and their performance characteristics determined by [facility] or [facility]  They may not be cleared or approved by the [place]  Food and Drug Administration  The FDA has determined that such clearance or approval is not necessary  These tests are used for clinical purposes  They should not be regarded as investigational or for research  This laboratory has been approved by CLIA 88, designated as a high-complexity laboratory and is qualified to perform these tests  Gross Description       A  The specimen is received in formalin, labeled with the patient's name and hospital number, and is designated "right true vocal cord  The specimen consists of 3 tan red soft tissue fragments each measuring 0 2-0 3 cm  Entirely submitted   Screened cassette  Note: The estimated total formalin fixation time based upon information provided by the submitting clinician and the standard processing schedule is under 72 hours  MCrites            Orders Placed This Encounter   Procedures    CBC    Comprehensive metabolic panel    Lipid Panel with Direct LDL reflex    TSH, 3rd generation       ALLERGIES:  Allergies   Allergen Reactions    Darvon [Propoxyphene] Itching       Current Medications     Current Outpatient Medications   Medication Sig Dispense Refill    albuterol (PROVENTIL HFA,VENTOLIN HFA) 90 mcg/act inhaler Inhale 2 puffs every 4 (four) hours as needed for wheezing or shortness of breath (cough) 1 Inhaler 3    aspirin (ECOTRIN LOW STRENGTH) 81 mg EC tablet Take 1 tablet by mouth daily      buPROPion (WELLBUTRIN XL) 150 mg 24 hr tablet TAKE 1 TABLET EVERY DAY (Patient taking differently: TAKE 1 TABLET EVERY DAY IN AM) 90 tablet 3    cyclobenzaprine (FLEXERIL) 10 mg tablet Take 1 tablet at bedtime as needed for muscle spasm 90 tablet 1    ezetimibe (ZETIA) 10 mg tablet TAKE 1 TABLET EVERY DAY (Patient taking differently: TAKE 1 TABLET EVERY DAY IN AM) 90 tablet 3    famotidine (PEPCID) 40 MG tablet Take 1 tablet by mouth      nicotine (NICODERM CQ) 14 mg/24hr TD 24 hr patch Place 1 patch on the skin every 24 hours 28 patch 0    nitroglycerin (NITROSTAT) 0 4 mg SL tablet Place 1 tablet under the tongue every 5 (five) minutes as needed      pantoprazole (PROTONIX) 40 mg tablet Take 40 mg by mouth daily after breakfast       rosuvastatin (CRESTOR) 20 MG tablet Take 20 mg by mouth daily      verapamil (CALAN-SR) 240 mg CR tablet TAKE 1 TABLET DAILY (Patient taking differently: TAKE 1 TABLET DAILY IN AM) 90 tablet 3    fluticasone-umeclidinium-vilanterol (TRELEGY ELLIPTA) 100-62 5-25 MCG/INH inhaler Inhale 1 puff daily Rinse mouth after use   1 Inhaler 5    linaCLOtide (LINZESS) 72 MCG CAPS Take 1 capsule by mouth daily as needed (Constipation) 30 capsule 2    nicotine (NICODERM CQ) 21 mg/24 hr TD 24 hr patch Place 1 patch on the skin every 24 hours 30 patch 0     No current facility-administered medications for this visit          Medications Discontinued During This Encounter   Medication Reason    predniSONE 20 mg tablet     Tiotropium Bromide Monohydrate (SPIRIVA RESPIMAT) 2 5 MCG/ACT AERS        Health Maintenance     Health Maintenance   Topic Date Due    Hepatitis C Screening  1960    BMI: Followup Plan  05/17/1977    INFLUENZA VACCINE  07/01/2019    DTaP,Tdap,and Td Vaccines (1 - Tdap) 09/05/2019 (Originally 5/17/1980)    MAMMOGRAM  12/27/2019    BMI: Adult  07/01/2020    CRC Screening: Colonoscopy  11/30/2022    Pneumococcal Vaccine: 65+ Years (1 of 2 - PCV13) 05/17/2024    Pneumococcal Vaccine: Pediatrics (0 to 5 Years) and At-Risk Patients (6 to 59 Years)  Completed    HEPATITIS B VACCINES  Aged Out       Immunization History   Administered Date(s) Administered    INFLUENZA 08/20/2015, 11/02/2017    Influenza Quadrivalent Preservative Free 3 years and older IM 08/17/2016, 11/02/2017    Influenza TIV (IM) 1960, 09/01/2011, 01/24/2013    Influenza, recombinant, quadrivalent,injectable, preservative free 10/30/2018    Pneumococcal Polysaccharide PPV23 11/15/2006, 11/02/2017       Hudson Sacks, MD

## 2019-07-02 ENCOUNTER — VBI (OUTPATIENT)
Dept: ADMINISTRATIVE | Facility: OTHER | Age: 59
End: 2019-07-02

## 2019-07-02 PROBLEM — R44.8 FACIAL PRESSURE: Status: RESOLVED | Noted: 2019-04-23 | Resolved: 2019-07-02

## 2019-07-02 PROBLEM — R06.83 SNORING: Status: RESOLVED | Noted: 2019-04-23 | Resolved: 2019-07-02

## 2019-07-02 PROBLEM — R09.81 NASAL CONGESTION: Status: RESOLVED | Noted: 2019-04-23 | Resolved: 2019-07-02

## 2019-07-02 NOTE — ASSESSMENT & PLAN NOTE
Stable left lower lobe 8 mm lung nodule, CT chest 2017, 2018 in 4/2019  [facility] pulmonology follows    Next study is due in 1 year

## 2019-07-02 NOTE — ASSESSMENT & PLAN NOTE
EGD November 2017  Patient remains under care of Gastroenterology    Symptoms are well controlled on regimen of Protonix and Pepcid

## 2019-07-02 NOTE — ASSESSMENT & PLAN NOTE
Patient reports chronic dyspnea on exertion  She follows up with [facility] pulmonology  Current med regimen includes Spiriva and p r n  Albuterol  Recent evaluation for chronic hoarseness by ENT  Will discontinue Spiriva and switch to Trelegy 1 puff daily  Tobacco Cessation Counseling: Tobacco cessation counseling and education was provided  The patient is sincerely urged to quit consumption of tobacco  She is ready to quit tobacco  The numerous health risks of tobacco consumption were discussed  Prescribed the following medications: nicotine patch

## 2019-07-02 NOTE — ASSESSMENT & PLAN NOTE
Patient denies symptoms of chest pain, palpitations, dizziness or leg edema    Continue regimen of verapamil, aspirin, Crestor and Zetia

## 2019-08-10 DIAGNOSIS — K21.00 GASTROESOPHAGEAL REFLUX DISEASE WITH ESOPHAGITIS: ICD-10-CM

## 2019-08-11 RX ORDER — PANTOPRAZOLE SODIUM 40 MG/1
TABLET, DELAYED RELEASE ORAL
Qty: 90 TABLET | Refills: 1 | Status: SHIPPED | OUTPATIENT
Start: 2019-08-11 | End: 2019-10-02

## 2019-09-03 DIAGNOSIS — K59.00 CONSTIPATION, UNSPECIFIED CONSTIPATION TYPE: ICD-10-CM

## 2019-09-17 LAB
ALBUMIN SERPL-MCNC: 4.1 G/DL (ref 3.6–5.1)
ALBUMIN/GLOB SERPL: 1.6 (CALC) (ref 1–2.5)
ALP SERPL-CCNC: 107 U/L (ref 33–130)
ALT SERPL-CCNC: 16 U/L (ref 6–29)
AST SERPL-CCNC: 17 U/L (ref 10–35)
BASOPHILS # BLD AUTO: 50 CELLS/UL (ref 0–200)
BASOPHILS NFR BLD AUTO: 0.7 %
BILIRUB SERPL-MCNC: 0.4 MG/DL (ref 0.2–1.2)
BUN SERPL-MCNC: 10 MG/DL (ref 7–25)
BUN/CREAT SERPL: ABNORMAL (CALC) (ref 6–22)
CALCIUM SERPL-MCNC: 9.3 MG/DL (ref 8.6–10.4)
CHLORIDE SERPL-SCNC: 106 MMOL/L (ref 98–110)
CHOLEST SERPL-MCNC: 127 MG/DL
CHOLEST/HDLC SERPL: 3 (CALC)
CO2 SERPL-SCNC: 27 MMOL/L (ref 20–32)
CREAT SERPL-MCNC: 0.78 MG/DL (ref 0.5–0.99)
EOSINOPHIL # BLD AUTO: 151 CELLS/UL (ref 15–500)
EOSINOPHIL NFR BLD AUTO: 2.1 %
ERYTHROCYTE [DISTWIDTH] IN BLOOD BY AUTOMATED COUNT: 12.5 % (ref 11–15)
GLOBULIN SER CALC-MCNC: 2.5 G/DL (CALC) (ref 1.9–3.7)
GLUCOSE SERPL-MCNC: 103 MG/DL (ref 65–99)
HCT VFR BLD AUTO: 46.8 % (ref 35–45)
HDLC SERPL-MCNC: 43 MG/DL
HGB BLD-MCNC: 15.9 G/DL (ref 11.7–15.5)
LDLC SERPL CALC-MCNC: 63 MG/DL (CALC)
LYMPHOCYTES # BLD AUTO: 2074 CELLS/UL (ref 850–3900)
LYMPHOCYTES NFR BLD AUTO: 28.8 %
MCH RBC QN AUTO: 29.7 PG (ref 27–33)
MCHC RBC AUTO-ENTMCNC: 34 G/DL (ref 32–36)
MCV RBC AUTO: 87.3 FL (ref 80–100)
MONOCYTES # BLD AUTO: 518 CELLS/UL (ref 200–950)
MONOCYTES NFR BLD AUTO: 7.2 %
NEUTROPHILS # BLD AUTO: 4406 CELLS/UL (ref 1500–7800)
NEUTROPHILS NFR BLD AUTO: 61.2 %
NONHDLC SERPL-MCNC: 84 MG/DL (CALC)
PLATELET # BLD AUTO: 166 THOUSAND/UL (ref 140–400)
PMV BLD REES-ECKER: 13.3 FL (ref 7.5–12.5)
POTASSIUM SERPL-SCNC: 3.9 MMOL/L (ref 3.5–5.3)
PROT SERPL-MCNC: 6.6 G/DL (ref 6.1–8.1)
RBC # BLD AUTO: 5.36 MILLION/UL (ref 3.8–5.1)
SL AMB EGFR AFRICAN AMERICAN: 96 ML/MIN/1.73M2
SL AMB EGFR NON AFRICAN AMERICAN: 83 ML/MIN/1.73M2
SODIUM SERPL-SCNC: 140 MMOL/L (ref 135–146)
TRIGL SERPL-MCNC: 129 MG/DL
TSH SERPL-ACNC: 1.4 MIU/L (ref 0.4–4.5)
WBC # BLD AUTO: 7.2 THOUSAND/UL (ref 3.8–10.8)

## 2019-10-02 ENCOUNTER — OFFICE VISIT (OUTPATIENT)
Dept: FAMILY MEDICINE CLINIC | Facility: CLINIC | Age: 59
End: 2019-10-02
Payer: COMMERCIAL

## 2019-10-02 VITALS
WEIGHT: 154.6 LBS | TEMPERATURE: 96.7 F | BODY MASS INDEX: 28.45 KG/M2 | SYSTOLIC BLOOD PRESSURE: 120 MMHG | OXYGEN SATURATION: 93 % | HEART RATE: 75 BPM | DIASTOLIC BLOOD PRESSURE: 82 MMHG | HEIGHT: 62 IN | RESPIRATION RATE: 18 BRPM

## 2019-10-02 DIAGNOSIS — I25.10 ATHEROSCLEROSIS OF CORONARY ARTERY OF NATIVE HEART WITHOUT ANGINA PECTORIS, UNSPECIFIED VESSEL OR LESION TYPE: Chronic | ICD-10-CM

## 2019-10-02 DIAGNOSIS — Z12.39 SCREENING FOR BREAST CANCER: ICD-10-CM

## 2019-10-02 DIAGNOSIS — Z23 INFLUENZA VACCINE NEEDED: ICD-10-CM

## 2019-10-02 DIAGNOSIS — J38.3 VOCAL CORD LEUKOPLAKIA: ICD-10-CM

## 2019-10-02 DIAGNOSIS — Z11.59 NEED FOR HEPATITIS C SCREENING TEST: ICD-10-CM

## 2019-10-02 DIAGNOSIS — K21.00 GASTROESOPHAGEAL REFLUX DISEASE WITH ESOPHAGITIS: ICD-10-CM

## 2019-10-02 DIAGNOSIS — G47.30 SLEEP DISORDER BREATHING: ICD-10-CM

## 2019-10-02 DIAGNOSIS — R91.1 SOLITARY PULMONARY NODULE: ICD-10-CM

## 2019-10-02 DIAGNOSIS — F33.9 RECURRENT MAJOR DEPRESSIVE DISORDER, REMISSION STATUS UNSPECIFIED (HCC): ICD-10-CM

## 2019-10-02 DIAGNOSIS — J44.9 MODERATE COPD (CHRONIC OBSTRUCTIVE PULMONARY DISEASE) (HCC): Primary | ICD-10-CM

## 2019-10-02 PROCEDURE — 90471 IMMUNIZATION ADMIN: CPT | Performed by: FAMILY MEDICINE

## 2019-10-02 PROCEDURE — 3008F BODY MASS INDEX DOCD: CPT | Performed by: FAMILY MEDICINE

## 2019-10-02 PROCEDURE — 90682 RIV4 VACC RECOMBINANT DNA IM: CPT | Performed by: FAMILY MEDICINE

## 2019-10-02 PROCEDURE — 99215 OFFICE O/P EST HI 40 MIN: CPT | Performed by: FAMILY MEDICINE

## 2019-10-02 RX ORDER — ALBUTEROL SULFATE 2.5 MG/3ML
2.5 SOLUTION RESPIRATORY (INHALATION) EVERY 4 HOURS PRN
Qty: 120 VIAL | Refills: 2 | Status: SHIPPED | OUTPATIENT
Start: 2019-10-02 | End: 2021-02-06 | Stop reason: SDUPTHER

## 2019-10-02 RX ORDER — BUPROPION HYDROCHLORIDE 300 MG/1
300 TABLET ORAL EVERY MORNING
Qty: 90 TABLET | Refills: 1 | Status: SHIPPED | OUTPATIENT
Start: 2019-10-02 | End: 2019-11-13

## 2019-10-02 NOTE — PATIENT INSTRUCTIONS
· Will increase dose of Wellbutrin from 150 mg once a day to 300 mg daily for the next few months in an effort to help you to quit tobacco  · Please stop Zetia  · Will repeat blood work in in 6 month   · Please schedule your mammogram after December 28

## 2019-10-02 NOTE — PROGRESS NOTES
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) - Primary     · Trelegy 1 puff daily  · Albuterol p r n    · Patient should quit tobacco         Relevant Medications    fluticasone-umeclidinium-vilanterol (TRELEGY) 100-62 5-25 MCG/INH inhaler    albuterol (2 5 mg/3 mL) 0 083 % nebulizer solution    Other Relevant Orders    Home Study    Vocal cord leukoplakia     Dr Hopper  follows            Cardiovascular and Mediastinum    Coronary atherosclerosis (Chronic)     · Patient denies symptoms of angina, unusual dyspnea, leg edema or palpitations  · Continue regimen of calcium channel blocker, aspirin and Crestor  · Will discontinue Zetia         Relevant Orders    CBC and differential    Comprehensive metabolic panel    Lipid Panel with Direct LDL reflex    TSH, 3rd generation       Other    Solitary pulmonary nodule     · CT chest 2019  · Stable 8 mm left upper lobe pulmonary nodule without additional nodule identified, stable emphysema and pulmonary scarring  · [facility] pulmonology follows, neck study is due in 2020           Other Visit Diagnoses     Sleep disorder breathing        Relevant Orders    Home Study    Screening for breast cancer        Relevant Orders    Mammo screening bilateral w 3d & cad    Influenza vaccine needed        Relevant Orders    FLUBLOK: influenza vaccine, quadrivalent, recombinant, PF, 0 5 mL (Completed)    Need for hepatitis C screening test        Relevant Orders    Hepatitis C antibody    Recurrent major depressive disorder, remission status unspecified ([facility] [address] )        Relevant Medications    buPROPion (WELLBUTRIN XL) 300 mg 24 hr tablet        Patient presents for follow-up of chronic medical conditions  Assessment and plan as outlined above  I commended her on healthy diet and weight loss  Will discontinue Zetia since her lipid panel is excellent  Patient will continue same daily medications for chronic medical conditions as outlined above  She has been trying to quit tobacco, unfortunately unsuccessfully so far  I advised patient to increase dose of Wellbutrin from 150-300 mg daily in an effort to facilitate complete tobacco cessation  Patient will use nicotine replacement patches along with bupropion  Patient remains under care of ENT and pulmonology for treatment of vocal cord leukoplakia, COPD and surveillance of pulmonary nodule  Will proceed with home study due to concerns of possible sleep apnea and daytime fatigue  Flu vaccine was administered today  Patient is up-to-date with Pneumovax  Follow-up and blood work in 6 months  Patient Instructions   · Will increase dose of Wellbutrin from 150 mg once a day to 300 mg daily for the next few months in an effort to help you to quit tobacco  · Please stop Zetia  · Will repeat blood work in in 6 month   · Please schedule your mammogram after December 28      Discussed with the patient and all questioned fully answered  She will call me if any problems arise  M*Modal software was used to dictate this note  It may contain errors with dictating incorrect words/spelling  Please contact provider directly with any questions  Chief Complaint     Chief Complaint   Patient presents with    Follow-up     Pt is here for 3 mos f/u blood work and flu shot       History of Present Illness     Patient presents for follow-up of chronic medical conditions  She remains under care of [facility] pulmonology in [facility]  Patient is still unfortunately smoking      Her last CT chest was performed in April of 2019, pulmonology recommended annual surveillance of known pulmonary nodules  Patient has been under ongoing care of Dr Kenneth Doyle for surveillance of vocal cord leukoplakia  Patient has significant difficulty in quitting smoking  She is well aware of serious risks of ongoing tobacco use given her history of COPD, pulmonary nodule and vocal cord problems  She is still smoking 12 cigarettes daily and would like to quit as soon as possible  Patient has been following healthy diet and lost 10 lb since last office visit 6 months ago  She has tried Trelegy for symptoms of COPD and noticed significant improvement  She would like to continue on this inhaler  She uses nebulized albuterol on a as needed basis only  Patient denies symptoms of chest pain, palpitations or leg edema  Chronic occasional dyspnea on exertion, occasional cough and wheezing  Patient remains on regimen of Crestor and Zetia for hyperlipidemia  Results of recent blood work performed on September 16th discussed with patient  Was all essentially normal aside from mild elevation of hemoglobin/hematocrit likely secondary to ongoing smoking and COPD  Patient's total cholesterol was 127 with LDL of 63  Patient's  reports snoring and breath holding spells at night  She is concerned about possibility of sleep apnea  She admits to daytime fatigue and somnolence at times      Review of Systems   Review of Systems   Constitutional: Positive for fatigue  HENT: Positive for voice change (Hoarseness)  Eyes: Negative  Respiratory: Positive for cough (Occasional), shortness of breath (Chronic on exertion) and wheezing ( occasional)  Cardiovascular: Negative  Negative for chest pain and palpitations  Gastrointestinal: Negative  Endocrine: Negative  Genitourinary: Negative  Musculoskeletal: Positive for arthralgias and back pain  Skin: Negative  Allergic/Immunologic: Negative  Neurological: Negative  Hematological: Negative  Psychiatric/Behavioral: Positive for sleep disturbance  Active Problem List     Patient Active Problem List   Diagnosis    Coronary atherosclerosis    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 [address] )    DDD (degenerative disc disease), lumbar    Depression with anxiety    Esophageal reflux    Gallbladder polyp    Hyperlipidemia    Low back pain    Myofascial pain syndrome, cervical    Osteopenia    Solitary pulmonary nodule    Vitamin D deficiency    Tobacco abuse    Hoarseness    Vocal cord leukoplakia    Sensorineural hearing loss (SNHL), bilateral    Oropharyngeal dysphagia    Suri's edema of vocal folds       Past Medical History:  Past Medical History:   Diagnosis Date    COPD (chronic obstructive pulmonary disease) ([facility] [address] )     GERD (gastroesophageal reflux disease)     Hypercholesterolemia     Iliotibial band tendonitis     Last Assessed: 6/5/2015    Migraine     Prinzmetal angina ([facility] )     Last Assessed: 5/23/2017    Sciatica     Last Assessed: 11/10/2014    Tobacco abuse 4/19/2018    Vertigo     Last Assessed: 4/12/2017       Past Surgical History:  Past Surgical History:   Procedure Laterality Date    COLONOSCOPY  06/2011    Complete: Dr Luis Bone - due in 5 years, Colonoscopy Nov 2017, Diverticulosis, 5 mm polyp, Due in 5 years    ESOPHAGOGASTRODUODENOSCOPY      Diagnostic; Last Assessed: 7/11/2014    FLEXOR TENDON REPAIR      left finger    HYSTERECTOMY      @ [address]    NE LARYNGOSCOPY,DIRECT,DIAGNOSTIC N/A 5/16/2019    Procedure: LARYNGOSCOPY DIRECT, FLEXIBLE BRONCHOSCOPY, FLEXIBLE ESOPHAGOSCOPY;  Surgeon: Hugo Villanueva MD;  Location: [facility];  Service: ENT    SHOULDER ARTHROSCOPY Right     SHOULDER SURGERY      TOTAL ABDOMINAL HYSTERECTOMY W/ BILATERAL SALPINGOOPHORECTOMY      endometriosis;  Last Assessed: 5/4/2015       Family History:  Family History   Problem Relation Age of Onset    Lung cancer Mother     Other Father         Dyslipidemia    Diabetes Sister     Hypertension Family     Breast cancer Maternal Grandmother 48       Social History:  Social History     Socioeconomic History    Marital status: /Civil Union     Spouse name: Not on file    Number of children: Not on file    Years of education: Not on file    Highest education level: Not on file   Occupational History    Not on file   Social Needs    Financial resource strain: Not on file    Food insecurity:     Worry: Not on file     Inability: Not on file    Transportation needs:     Medical: Not on file     Non-medical: Not on file   Tobacco Use    Smoking status: Current Every Day Smoker     Packs/day: 0 50     Years: 48 00     Pack years: 24 00     Types: Cigarettes    Smokeless tobacco: Never Used   Substance and Sexual Activity    Alcohol use: No    Drug use: No    Sexual activity: Not on file   Lifestyle    Physical activity:     Days per week: Not on file     Minutes per session: Not on file    Stress: Not on file   Relationships    Social connections:     Talks on phone: Not on file     Gets together: Not on file     Attends [employer] service: Not on file     Active member of club or organization: Not on file     Attends meetings of clubs or organizations: Not on file     Relationship status: Not on file    Intimate partner violence:     Fear of current or ex partner: Not on file     Emotionally abused: Not on file     Physically abused: Not on file     Forced sexual activity: Not on file   Other Topics Concern    Not on file   Social History Narrative    Working full time       Objective     Vitals:    10/02/19 0924   BP: 120/82   Pulse: 75   Resp: 18   Temp: (!) 96 7 °F (35 9 °C)   TempSrc: Tympanic   SpO2: 93%   Weight: 70 1 kg (154 lb 9 6 oz)   Height: 5' 2" (1 575 m)     Wt Readings from Last 3 Encounters:   10/02/19 70 1 kg (154 lb 9 6 oz)   10/01/19 70 3 kg (155 lb)   08/20/19 70 3 kg (155 lb) Physical Exam   Constitutional: She is oriented to person, place, and time  She appears well-developed and well-nourished  HENT:   Head: Normocephalic and atraumatic  Eyes: Conjunctivae are normal    Neck: Neck supple  Carotid bruit is not present  No thyromegaly present  Cardiovascular: Normal rate, regular rhythm and normal heart sounds  No murmur heard  Pulmonary/Chest: Effort normal and breath sounds normal  No respiratory distress  She has no wheezes  Abdominal: She exhibits no abdominal bruit  Musculoskeletal: Normal range of motion  She exhibits no edema  Neurological: She is alert and oriented to person, place, and time  No cranial nerve deficit  Coordination normal    Psychiatric: She has a normal mood and affect  Her behavior is normal    Nursing note and vitals reviewed        Pertinent Laboratory/Diagnostic Studies:  Lab Results   Component Value Date    BUN 10 09/16/2019    CREATININE 0 78 09/16/2019    CALCIUM 9 3 09/16/2019     01/18/2018    K 3 9 09/16/2019    CO2 27 09/16/2019     09/16/2019     Lab Results   Component Value Date    ALT 16 09/16/2019    AST 17 09/16/2019    ALKPHOS 107 09/16/2019    BILITOT 0 4 01/18/2018       Lab Results   Component Value Date    WBC 7 2 09/16/2019    HGB 15 9 (H) 09/16/2019    HCT 46 8 (H) 09/16/2019    MCV 87 3 09/16/2019     09/16/2019       Lab Results   Component Value Date    TSH 1 40 09/16/2019       Lab Results   Component Value Date    CHOL 163 10/31/2017     Lab Results   Component Value Date    TRIG 129 09/16/2019     Lab Results   Component Value Date    HDL 43 (L) 09/16/2019     No results found for: [facility]  Lab Results   Component Value Date    HGBA1C 5 7 (H) 11/05/2012       Results for orders placed or performed in visit on 09/16/19   Lipid Panel with Direct LDL reflex   Result Value Ref Range    Total Cholesterol 127 <200 mg/dL    HDL 43 (L) >50 mg/dL    Triglycerides 129 <150 mg/dL    LDL Direct 63 mg/dL (calc)    Chol HDLC Ratio 3 0 <5 0 (calc)    Non-HDL Cholesterol 84 <130 mg/dL (calc)   Comprehensive metabolic panel   Result Value Ref Range    Glucose, Random 103 (H) 65 - 99 mg/dL    BUN 10 7 - 25 mg/dL    Creatinine 0 78 0 50 - 0 99 mg/dL    eGFR Non  83 > OR = 60 mL/min/1 73m2    eGFR  96 > OR = 60 mL/min/1 73m2    SL AMB BUN/CREATININE RATIO NOT APPLICABLE 6 - 22 (calc)    Sodium 140 135 - 146 mmol/L    Potassium 3 9 3 5 - 5 3 mmol/L    Chloride 106 98 - 110 mmol/L    CO2 27 20 - 32 mmol/L    SL AMB CALCIUM 9 3 8 6 - 10 4 mg/dL    Protein, Total 6 6 6 1 - 8 1 g/dL    Albumin 4 1 3 6 - 5 1 g/dL    Globulin 2 5 1 9 - 3 7 g/dL (calc)    Albumin/Globulin Ratio 1 6 1 0 - 2 5 (calc)    TOTAL BILIRUBIN 0 4 0 2 - 1 2 mg/dL    Alkaline Phosphatase 107 33 - 130 U/L    AST 17 10 - 35 U/L    ALT 16 6 - 29 U/L   CBC and differential   Result Value Ref Range    White Blood Cell Count 7 2 3 8 - 10 8 Thousand/uL    Red Blood Cell Count 5 36 (H) 3 80 - 5 10 Million/uL    Hemoglobin 15 9 (H) 11 7 - 15 5 g/dL    HCT 46 8 (H) 35 0 - 45 0 %    MCV 87 3 80 0 - 100 0 fL    MCH 29 7 27 0 - 33 0 pg    MCHC 34 0 32 0 - 36 0 g/dL    RDW 12 5 11 0 - 15 0 %    Platelet Count 755 189 - 400 Thousand/uL    SL AMB MPV 13 3 (H) 7 5 - 12 5 fL    Neutrophils (Absolute) 4,406 1,500 - 7,800 cells/uL    Lymphocytes (Absolute) 2,074 850 - 3,900 cells/uL    Monocytes (Absolute) 518 200 - 950 cells/uL    Eosinophils (Absolute) 151 15 - 500 cells/uL    Basophils ABS 50 0 - 200 cells/uL    Neutrophils 61 2 %    Lymphocytes 28 8 %    Monocytes 7 2 %    Eosinophils 2 1 %    Basophils PCT 0 7 %   TSH, 3rd generation   Result Value Ref Range    TSH 1 40 0 40 - 4 50 mIU/L       Orders Placed This Encounter   Procedures    Mammo screening bilateral w 3d & cad    FLUBLOK: influenza vaccine, quadrivalent, recombinant, PF, 0 5 mL    CBC and differential    Comprehensive metabolic panel    Lipid Panel with Direct LDL reflex  TSH, 3rd generation    Hepatitis C antibody    Home Study       ALLERGIES:  Allergies   Allergen Reactions    Darvon [Propoxyphene] Itching       Current Medications     Current Outpatient Medications   Medication Sig Dispense Refill    albuterol (PROVENTIL HFA,VENTOLIN HFA) 90 mcg/act inhaler Inhale 2 puffs every 4 (four) hours as needed for wheezing or shortness of breath (cough) 1 Inhaler 3    aspirin (ECOTRIN LOW STRENGTH) 81 mg EC tablet Take 1 tablet by mouth daily      buPROPion (WELLBUTRIN XL) 300 mg 24 hr tablet Take 1 tablet (300 mg total) by mouth every morning 90 tablet 1    cyclobenzaprine (FLEXERIL) 10 mg tablet Take 1 tablet at bedtime as needed for muscle spasm 90 tablet 1    famotidine (PEPCID) 40 MG tablet Take 1 tablet by mouth      fluticasone-umeclidinium-vilanterol (TRELEGY) 100-62 5-25 MCG/INH inhaler Inhale 1 puff daily Rinse mouth after use  1 Inhaler 11    linaCLOtide (LINZESS) 72 MCG CAPS Take 1 capsule by mouth daily as needed (Constipation) 30 capsule 2    nicotine (NICODERM CQ) 14 mg/24hr TD 24 hr patch Place 1 patch on the skin every 24 hours 28 patch 0    nicotine (NICODERM CQ) 21 mg/24 hr TD 24 hr patch Place 1 patch on the skin every 24 hours 30 patch 0    nitroglycerin (NITROSTAT) 0 4 mg SL tablet Place 1 tablet under the tongue every 5 (five) minutes as needed      pantoprazole (PROTONIX) 40 mg tablet Take 40 mg by mouth daily after breakfast       rosuvastatin (CRESTOR) 20 MG tablet Take 20 mg by mouth daily      verapamil (CALAN-SR) 240 mg CR tablet TAKE 1 TABLET DAILY (Patient taking differently: TAKE 1 TABLET DAILY IN AM) 90 tablet 3    albuterol (2 5 mg/3 mL) 0 083 % nebulizer solution Take 1 vial (2 5 mg total) by nebulization every 4 (four) hours as needed for wheezing or shortness of breath 120 vial 2     No current facility-administered medications for this visit          Medications Discontinued During This Encounter   Medication Reason    pantoprazole (PROTONIX) 40 mg tablet     fluticasone-umeclidinium-vilanterol (TRELEGY ELLIPTA) 100-62 5-25 MCG/INH inhaler Reorder    ezetimibe (ZETIA) 10 mg tablet     buPROPion (WELLBUTRIN XL) 150 mg 24 hr tablet Reorder       Health Maintenance     Health Maintenance   Topic Date Due    Hepatitis C Screening  1960    DTaP,Tdap,and Td Vaccines (1 - Tdap) 05/17/1980    BMI: Followup Plan  07/02/2020    BMI: Adult  10/02/2020    MAMMOGRAM  12/27/2020    CRC Screening: Colonoscopy  11/30/2022    Pneumococcal Vaccine: 65+ Years (1 of 2 - PCV13) 05/17/2024    Pneumococcal Vaccine: Pediatrics (0 to 5 Years) and At-Risk Patients (6 to 59 Years)  Completed    INFLUENZA VACCINE  Completed    HEPATITIS B VACCINES  Aged Out       Immunization History   Administered Date(s) Administered    INFLUENZA 08/20/2015, 11/02/2017    Influenza Quadrivalent Preservative Free 3 years and older IM 08/17/2016, 11/02/2017    Influenza TIV (IM) 1960, 09/01/2011, 01/24/2013    Influenza, recombinant, quadrivalent,injectable, preservative free 10/30/2018, 10/02/2019    Pneumococcal Polysaccharide PPV23 11/15/2006, 11/02/2017       Braydon Lama MD

## 2019-10-06 NOTE — ASSESSMENT & PLAN NOTE
· CT chest April 2019  · Stable 8 mm left upper lobe pulmonary nodule without additional nodule identified, stable emphysema and pulmonary scarring  · [facility]'s pulmonology follows, neck study is due in April of 2020

## 2019-10-06 NOTE — ASSESSMENT & PLAN NOTE
· Patient denies symptoms of angina, unusual dyspnea, leg edema or palpitations  · Continue regimen of calcium channel blocker, aspirin and Crestor  · Will discontinue Zetia

## 2019-10-14 ENCOUNTER — TELEPHONE (OUTPATIENT)
Dept: SLEEP CENTER | Facility: CLINIC | Age: 59
End: 2019-10-14

## 2019-10-14 NOTE — TELEPHONE ENCOUNTER
----- Message from Kim Laughlin MD sent at 10/13/2019  9:10 PM EDT -----  Approved  ----- Message -----  From: Denise Rodríguez: 10/9/2019  10:09 AM EDT  To: Sleep [facility], #    PLEASE REVIEW FOR APPROVAL OR DENIAL AND WHY

## 2019-10-14 NOTE — TELEPHONE ENCOUNTER
----- Message from Leslie Campoverde MD sent at 10/13/2019  9:10 PM EDT -----  Approved  ----- Message -----  From: Aleena Baez: 10/9/2019  10:09 AM EDT  To: Sleep Medicine [facility] [facility], #    PLEASE REVIEW FOR APPROVAL OR DENIAL AND WHY

## 2019-10-16 DIAGNOSIS — I25.10 ATHEROSCLEROSIS OF CORONARY ARTERY OF NATIVE HEART WITHOUT ANGINA PECTORIS, UNSPECIFIED VESSEL OR LESION TYPE: Primary | Chronic | ICD-10-CM

## 2019-10-16 RX ORDER — ROSUVASTATIN CALCIUM 20 MG/1
TABLET, COATED ORAL
Qty: 90 TABLET | Refills: 3 | Status: SHIPPED | OUTPATIENT
Start: 2019-10-16 | End: 2020-10-03

## 2019-11-12 DIAGNOSIS — F41.8 DEPRESSION WITH ANXIETY: Primary | ICD-10-CM

## 2019-11-12 DIAGNOSIS — K59.00 CONSTIPATION, UNSPECIFIED CONSTIPATION TYPE: ICD-10-CM

## 2019-11-13 RX ORDER — BUPROPION HYDROCHLORIDE 300 MG/1
300 TABLET ORAL DAILY
Qty: 90 TABLET | Refills: 1 | Status: SHIPPED | OUTPATIENT
Start: 2019-11-13 | End: 2020-05-08

## 2019-11-13 RX ORDER — LINACLOTIDE 72 UG/1
CAPSULE, GELATIN COATED ORAL
Qty: 90 CAPSULE | Refills: 1 | Status: SHIPPED | OUTPATIENT
Start: 2019-11-13 | End: 2020-10-26

## 2019-11-21 DIAGNOSIS — I25.119 CORONARY ARTERY DISEASE INVOLVING NATIVE HEART WITH ANGINA PECTORIS, UNSPECIFIED VESSEL OR LESION TYPE (HCC): ICD-10-CM

## 2019-11-21 RX ORDER — VERAPAMIL HYDROCHLORIDE 240 MG/1
TABLET, FILM COATED, EXTENDED RELEASE ORAL
Qty: 90 TABLET | Refills: 3 | Status: SHIPPED | OUTPATIENT
Start: 2019-11-21 | End: 2020-11-07

## 2020-01-09 DIAGNOSIS — K21.00 GASTROESOPHAGEAL REFLUX DISEASE WITH ESOPHAGITIS: Primary | ICD-10-CM

## 2020-01-09 DIAGNOSIS — J44.9 MODERATE COPD (CHRONIC OBSTRUCTIVE PULMONARY DISEASE) (HCC): ICD-10-CM

## 2020-01-09 RX ORDER — PANTOPRAZOLE SODIUM 40 MG/1
TABLET, DELAYED RELEASE ORAL
Qty: 90 TABLET | Refills: 3 | Status: SHIPPED | OUTPATIENT
Start: 2020-01-09 | End: 2020-12-21

## 2020-01-22 ENCOUNTER — HOSPITAL ENCOUNTER (OUTPATIENT)
Dept: RADIOLOGY | Age: 60
Discharge: HOME | End: 2020-01-22
Payer: COMMERCIAL

## 2020-01-22 VITALS — BODY MASS INDEX: 27.42 KG/M2 | HEIGHT: 62 IN | WEIGHT: 149 LBS

## 2020-01-22 DIAGNOSIS — Z12.39 SCREENING FOR BREAST CANCER: ICD-10-CM

## 2020-01-22 PROCEDURE — 77067 SCR MAMMO BI INCL CAD: CPT

## 2020-01-22 PROCEDURE — 77063 BREAST TOMOSYNTHESIS BI: CPT

## 2020-05-07 DIAGNOSIS — F41.8 DEPRESSION WITH ANXIETY: ICD-10-CM

## 2020-05-08 RX ORDER — BUPROPION HYDROCHLORIDE 300 MG/1
TABLET ORAL
Qty: 90 TABLET | Refills: 1 | Status: SHIPPED | OUTPATIENT
Start: 2020-05-08 | End: 2020-05-24

## 2020-05-19 ENCOUNTER — OFFICE VISIT (OUTPATIENT)
Dept: FAMILY MEDICINE CLINIC | Facility: CLINIC | Age: 60
End: 2020-05-19
Payer: COMMERCIAL

## 2020-05-19 VITALS
OXYGEN SATURATION: 98 % | WEIGHT: 156.2 LBS | RESPIRATION RATE: 18 BRPM | HEART RATE: 78 BPM | TEMPERATURE: 96.8 F | DIASTOLIC BLOOD PRESSURE: 78 MMHG | BODY MASS INDEX: 28.74 KG/M2 | HEIGHT: 62 IN | SYSTOLIC BLOOD PRESSURE: 130 MMHG

## 2020-05-19 DIAGNOSIS — J44.9 MODERATE COPD (CHRONIC OBSTRUCTIVE PULMONARY DISEASE) (HCC): ICD-10-CM

## 2020-05-19 DIAGNOSIS — I25.10 ATHEROSCLEROSIS OF CORONARY ARTERY OF NATIVE HEART WITHOUT ANGINA PECTORIS, UNSPECIFIED VESSEL OR LESION TYPE: Chronic | ICD-10-CM

## 2020-05-19 DIAGNOSIS — F41.8 DEPRESSION WITH ANXIETY: ICD-10-CM

## 2020-05-19 DIAGNOSIS — Z00.00 ENCOUNTER FOR ANNUAL HEALTH EXAMINATION: Primary | ICD-10-CM

## 2020-05-19 DIAGNOSIS — R91.1 SOLITARY PULMONARY NODULE: ICD-10-CM

## 2020-05-19 DIAGNOSIS — E78.5 HYPERLIPIDEMIA, UNSPECIFIED HYPERLIPIDEMIA TYPE: ICD-10-CM

## 2020-05-19 DIAGNOSIS — J38.3 VOCAL CORD LEUKOPLAKIA: ICD-10-CM

## 2020-05-19 PROCEDURE — 3008F BODY MASS INDEX DOCD: CPT | Performed by: FAMILY MEDICINE

## 2020-05-19 PROCEDURE — 99396 PREV VISIT EST AGE 40-64: CPT | Performed by: FAMILY MEDICINE

## 2020-05-19 RX ORDER — FLUTICASONE FUROATE AND VILANTEROL 100; 25 UG/1; UG/1
1 POWDER RESPIRATORY (INHALATION) DAILY
Qty: 1 INHALER | Refills: 5 | Status: SHIPPED | OUTPATIENT
Start: 2020-05-19 | End: 2020-05-28

## 2020-05-24 ENCOUNTER — TELEPHONE (OUTPATIENT)
Dept: FAMILY MEDICINE CLINIC | Facility: CLINIC | Age: 60
End: 2020-05-24

## 2020-05-24 RX ORDER — BUPROPION HYDROCHLORIDE 150 MG/1
TABLET, EXTENDED RELEASE ORAL
Qty: 60 TABLET | Refills: 3 | Status: SHIPPED | OUTPATIENT
Start: 2020-05-24 | End: 2020-08-19

## 2020-05-28 ENCOUNTER — CLINICAL SUPPORT (OUTPATIENT)
Dept: FAMILY MEDICINE CLINIC | Facility: CLINIC | Age: 60
End: 2020-05-28

## 2020-05-28 ENCOUNTER — TELEPHONE (OUTPATIENT)
Dept: FAMILY MEDICINE CLINIC | Facility: CLINIC | Age: 60
End: 2020-05-28

## 2020-05-28 DIAGNOSIS — J44.9 MODERATE COPD (CHRONIC OBSTRUCTIVE PULMONARY DISEASE) (HCC): Primary | ICD-10-CM

## 2020-05-28 DIAGNOSIS — Z72.0 TOBACCO ABUSE: ICD-10-CM

## 2020-05-28 DIAGNOSIS — F41.8 DEPRESSION WITH ANXIETY: ICD-10-CM

## 2020-06-03 ENCOUNTER — DOCUMENTATION (OUTPATIENT)
Dept: FAMILY MEDICINE CLINIC | Facility: CLINIC | Age: 60
End: 2020-06-03

## 2020-08-19 DIAGNOSIS — F41.8 DEPRESSION WITH ANXIETY: ICD-10-CM

## 2020-08-19 RX ORDER — BUPROPION HYDROCHLORIDE 150 MG/1
TABLET, EXTENDED RELEASE ORAL
Qty: 180 TABLET | Refills: 1 | Status: SHIPPED | OUTPATIENT
Start: 2020-08-19 | End: 2021-03-10

## 2020-08-24 DIAGNOSIS — J44.9 MODERATE COPD (CHRONIC OBSTRUCTIVE PULMONARY DISEASE) (HCC): ICD-10-CM

## 2020-09-18 LAB
ALBUMIN SERPL-MCNC: 4.2 G/DL (ref 3.6–5.1)
ALBUMIN/GLOB SERPL: 1.7 (CALC) (ref 1–2.5)
ALP SERPL-CCNC: 112 U/L (ref 37–153)
ALT SERPL-CCNC: 18 U/L (ref 6–29)
AST SERPL-CCNC: 17 U/L (ref 10–35)
BASOPHILS # BLD AUTO: 78 CELLS/UL (ref 0–200)
BASOPHILS NFR BLD AUTO: 1 %
BILIRUB SERPL-MCNC: 0.4 MG/DL (ref 0.2–1.2)
BUN SERPL-MCNC: 14 MG/DL (ref 7–25)
BUN/CREAT SERPL: ABNORMAL (CALC) (ref 6–22)
CALCIUM SERPL-MCNC: 9.4 MG/DL (ref 8.6–10.4)
CHLORIDE SERPL-SCNC: 105 MMOL/L (ref 98–110)
CHOLEST SERPL-MCNC: 155 MG/DL
CHOLEST/HDLC SERPL: 3.3 (CALC)
CO2 SERPL-SCNC: 29 MMOL/L (ref 20–32)
CREAT SERPL-MCNC: 0.89 MG/DL (ref 0.5–0.99)
EOSINOPHIL # BLD AUTO: 203 CELLS/UL (ref 15–500)
EOSINOPHIL NFR BLD AUTO: 2.6 %
ERYTHROCYTE [DISTWIDTH] IN BLOOD BY AUTOMATED COUNT: 12.3 % (ref 11–15)
GLOBULIN SER CALC-MCNC: 2.5 G/DL (CALC) (ref 1.9–3.7)
GLUCOSE SERPL-MCNC: 114 MG/DL (ref 65–99)
HCT VFR BLD AUTO: 48.5 % (ref 35–45)
HCV AB S/CO SERPL IA: 0.01
HCV AB SERPL QL IA: NORMAL
HDLC SERPL-MCNC: 47 MG/DL
HGB BLD-MCNC: 16.4 G/DL (ref 11.7–15.5)
LDLC SERPL CALC-MCNC: 87 MG/DL (CALC)
LYMPHOCYTES # BLD AUTO: 2161 CELLS/UL (ref 850–3900)
LYMPHOCYTES NFR BLD AUTO: 27.7 %
MCH RBC QN AUTO: 30.1 PG (ref 27–33)
MCHC RBC AUTO-ENTMCNC: 33.8 G/DL (ref 32–36)
MCV RBC AUTO: 89.2 FL (ref 80–100)
MONOCYTES # BLD AUTO: 569 CELLS/UL (ref 200–950)
MONOCYTES NFR BLD AUTO: 7.3 %
NEUTROPHILS # BLD AUTO: 4789 CELLS/UL (ref 1500–7800)
NEUTROPHILS NFR BLD AUTO: 61.4 %
NONHDLC SERPL-MCNC: 108 MG/DL (CALC)
PLATELET # BLD AUTO: 158 THOUSAND/UL (ref 140–400)
PMV BLD REES-ECKER: 12.8 FL (ref 7.5–12.5)
POTASSIUM SERPL-SCNC: 4.7 MMOL/L (ref 3.5–5.3)
PROT SERPL-MCNC: 6.7 G/DL (ref 6.1–8.1)
RBC # BLD AUTO: 5.44 MILLION/UL (ref 3.8–5.1)
SL AMB EGFR AFRICAN AMERICAN: 81 ML/MIN/1.73M2
SL AMB EGFR NON AFRICAN AMERICAN: 70 ML/MIN/1.73M2
SODIUM SERPL-SCNC: 141 MMOL/L (ref 135–146)
TRIGL SERPL-MCNC: 113 MG/DL
TSH SERPL-ACNC: 2.25 MIU/L (ref 0.4–4.5)
WBC # BLD AUTO: 7.8 THOUSAND/UL (ref 3.8–10.8)

## 2020-09-21 ENCOUNTER — TELEPHONE (OUTPATIENT)
Dept: FAMILY MEDICINE CLINIC | Facility: CLINIC | Age: 60
End: 2020-09-21

## 2020-09-21 NOTE — TELEPHONE ENCOUNTER
Spoke with Pt and she is aware of her B/W results and has no further questions at this time  WE did schedule her for a F/U as requested

## 2020-09-21 NOTE — TELEPHONE ENCOUNTER
Please consider the patient  Blood work overall is normal/stable  Fasting blood sugar is slightly higher than before  Please advise patient to follow low carbohydrate diet and stay active  Please schedule follow-up/check up sometime in October/November    Thank you

## 2020-10-02 NOTE — TELEPHONE ENCOUNTER
----- Message from Liv Austin MD sent at 8/3/2018  2:31 PM EDT -----  Please contact patient  All her blood work is normal   Please continue same medications and keep follow-up as scheduled  Consult Summary     66 year old male with a PMH of bladder cancer s/p TURBT by Dr. Jo, recent hospital stay for C diff colitis discharged  on PO Dificin, Afib on Eliquis DM, CABG presents to ED for abdominal pain, nausea, vomiting and diarrhea. Patient was just discharged from the hospital 1 week ago after being her for about 1 month with C. Diff colitis. Patient was not improving so they started him on Fidaxomicin, and sent him home. Patient admitted with ileus , s/p SBR and then taken back to OR today abd entire bowel was found to be ischemic Medical update was provided to son by team. Patient had code blue and had ROSC in 2 mins. Code status changed to DNR by son.     _______16_____ minutes spent discussing Advance Care Planning.     Patient  soon after he was seen.     Please Call [phone] PRN I examined the patient and discussed my plan with the resident  the patient has ileus vs obstruction and has a ct demonstrating non-progression of contrast  he initially had elevated lactate likely from hypovolemia but this resolved after fluid admin  he has hyponatremia, non-critical, will trend and monitor neurologic status  he is hypertensive, and is limited for his home regimen by npo  abdomen remains ttp and wbc elevated in setting of recent cdiff, completed abx and does not have diarrhea.    dispo step down

## 2020-10-03 DIAGNOSIS — I25.10 ATHEROSCLEROSIS OF CORONARY ARTERY OF NATIVE HEART WITHOUT ANGINA PECTORIS, UNSPECIFIED VESSEL OR LESION TYPE: Chronic | ICD-10-CM

## 2020-10-03 RX ORDER — ROSUVASTATIN CALCIUM 20 MG/1
TABLET, COATED ORAL
Qty: 90 TABLET | Refills: 3 | Status: SHIPPED | OUTPATIENT
Start: 2020-10-03 | End: 2021-10-01

## 2020-10-26 ENCOUNTER — OFFICE VISIT (OUTPATIENT)
Dept: FAMILY MEDICINE CLINIC | Facility: CLINIC | Age: 60
End: 2020-10-26
Payer: COMMERCIAL

## 2020-10-26 VITALS
OXYGEN SATURATION: 95 % | BODY MASS INDEX: 27.33 KG/M2 | WEIGHT: 148.5 LBS | DIASTOLIC BLOOD PRESSURE: 70 MMHG | HEART RATE: 64 BPM | HEIGHT: 62 IN | TEMPERATURE: 97.5 F | SYSTOLIC BLOOD PRESSURE: 118 MMHG | RESPIRATION RATE: 18 BRPM

## 2020-10-26 DIAGNOSIS — R35.0 URINARY FREQUENCY: Primary | ICD-10-CM

## 2020-10-26 DIAGNOSIS — F33.41 RECURRENT MAJOR DEPRESSIVE DISORDER, IN PARTIAL REMISSION (HCC): ICD-10-CM

## 2020-10-26 DIAGNOSIS — I25.10 ATHEROSCLEROSIS OF CORONARY ARTERY OF NATIVE HEART WITHOUT ANGINA PECTORIS, UNSPECIFIED VESSEL OR LESION TYPE: Chronic | ICD-10-CM

## 2020-10-26 DIAGNOSIS — Z23 INFLUENZA VACCINE NEEDED: ICD-10-CM

## 2020-10-26 DIAGNOSIS — J44.9 MODERATE COPD (CHRONIC OBSTRUCTIVE PULMONARY DISEASE) (HCC): ICD-10-CM

## 2020-10-26 DIAGNOSIS — R73.02 IGT (IMPAIRED GLUCOSE TOLERANCE): ICD-10-CM

## 2020-10-26 DIAGNOSIS — R32 URINARY INCONTINENCE, UNSPECIFIED TYPE: ICD-10-CM

## 2020-10-26 DIAGNOSIS — E78.5 HYPERLIPIDEMIA, UNSPECIFIED HYPERLIPIDEMIA TYPE: ICD-10-CM

## 2020-10-26 DIAGNOSIS — K21.00 GASTROESOPHAGEAL REFLUX DISEASE WITH ESOPHAGITIS, UNSPECIFIED WHETHER HEMORRHAGE: ICD-10-CM

## 2020-10-26 LAB
SL AMB  POCT GLUCOSE, UA: NORMAL
SL AMB LEUKOCYTE ESTERASE,UA: NORMAL
SL AMB POCT BILIRUBIN,UA: NORMAL
SL AMB POCT BLOOD,UA: NORMAL
SL AMB POCT CLARITY,UA: CLEAR
SL AMB POCT COLOR,UA: YELLOW
SL AMB POCT KETONES,UA: NORMAL
SL AMB POCT NITRITE,UA: NORMAL
SL AMB POCT PH,UA: 6.5
SL AMB POCT SPECIFIC GRAVITY,UA: 1
SL AMB POCT URINE PROTEIN: NORMAL
SL AMB POCT UROBILINOGEN: 0.2

## 2020-10-26 PROCEDURE — 90471 IMMUNIZATION ADMIN: CPT | Performed by: FAMILY MEDICINE

## 2020-10-26 PROCEDURE — 3725F SCREEN DEPRESSION PERFORMED: CPT | Performed by: FAMILY MEDICINE

## 2020-10-26 PROCEDURE — 81003 URINALYSIS AUTO W/O SCOPE: CPT | Performed by: FAMILY MEDICINE

## 2020-10-26 PROCEDURE — 3008F BODY MASS INDEX DOCD: CPT | Performed by: FAMILY MEDICINE

## 2020-10-26 PROCEDURE — 90682 RIV4 VACC RECOMBINANT DNA IM: CPT | Performed by: FAMILY MEDICINE

## 2020-10-26 PROCEDURE — 99215 OFFICE O/P EST HI 40 MIN: CPT | Performed by: FAMILY MEDICINE

## 2020-10-26 RX ORDER — NITROGLYCERIN 0.4 MG/1
0.4 TABLET SUBLINGUAL
Qty: 30 TABLET | Refills: 1 | Status: SHIPPED | OUTPATIENT
Start: 2020-10-26 | End: 2022-04-26

## 2020-10-26 RX ORDER — ALBUTEROL SULFATE 90 UG/1
2 AEROSOL, METERED RESPIRATORY (INHALATION) EVERY 4 HOURS PRN
Qty: 1 INHALER | Refills: 3 | Status: SHIPPED | OUTPATIENT
Start: 2020-10-26 | End: 2021-02-06 | Stop reason: SDUPTHER

## 2020-10-27 ENCOUNTER — DOCUMENTATION (OUTPATIENT)
Dept: FAMILY MEDICINE CLINIC | Facility: CLINIC | Age: 60
End: 2020-10-27

## 2020-10-27 DIAGNOSIS — J44.9 MODERATE COPD (CHRONIC OBSTRUCTIVE PULMONARY DISEASE) (HCC): Primary | ICD-10-CM

## 2020-10-29 RX ORDER — FLUTICASONE PROPIONATE AND SALMETEROL 232; 14 UG/1; UG/1
1 POWDER, METERED RESPIRATORY (INHALATION) 2 TIMES DAILY
Qty: 1 EACH | Refills: 5 | Status: SHIPPED | OUTPATIENT
Start: 2020-10-29 | End: 2020-11-02

## 2020-10-30 ENCOUNTER — TELEPHONE (OUTPATIENT)
Dept: FAMILY MEDICINE CLINIC | Facility: CLINIC | Age: 60
End: 2020-10-30

## 2020-10-30 DIAGNOSIS — R35.0 URINARY FREQUENCY: Primary | ICD-10-CM

## 2020-10-30 DIAGNOSIS — J44.9 MODERATE COPD (CHRONIC OBSTRUCTIVE PULMONARY DISEASE) (HCC): ICD-10-CM

## 2020-10-30 RX ORDER — SULFAMETHOXAZOLE AND TRIMETHOPRIM 800; 160 MG/1; MG/1
1 TABLET ORAL 2 TIMES DAILY
Qty: 14 TABLET | Refills: 0 | Status: SHIPPED | OUTPATIENT
Start: 2020-10-30 | End: 2020-11-06

## 2020-10-31 LAB
APPEARANCE UR: CLEAR
BACTERIA UR QL AUTO: ABNORMAL /HPF
BILIRUB UR QL STRIP: NEGATIVE
COLOR UR: YELLOW
GLUCOSE UR QL STRIP: NEGATIVE
HGB UR QL STRIP: NEGATIVE
HYALINE CASTS #/AREA URNS LPF: ABNORMAL /LPF
KETONES UR QL STRIP: NEGATIVE
LEUKOCYTE ESTERASE UR QL STRIP: ABNORMAL
NITRITE UR QL STRIP: POSITIVE
PH UR STRIP: 6.5 [PH] (ref 5–8)
PROT UR QL STRIP: NEGATIVE
RBC #/AREA URNS HPF: ABNORMAL /HPF
SP GR UR STRIP: 1.01 (ref 1–1.03)
SQUAMOUS #/AREA URNS HPF: ABNORMAL /HPF
WBC #/AREA URNS HPF: ABNORMAL /HPF

## 2020-11-02 RX ORDER — FLUTICASONE PROPIONATE AND SALMETEROL 232; 14 UG/1; UG/1
POWDER, METERED RESPIRATORY (INHALATION)
Qty: 1 EACH | Refills: 5 | Status: SHIPPED | OUTPATIENT
Start: 2020-11-02 | End: 2021-04-26

## 2020-11-07 DIAGNOSIS — I25.119 CORONARY ARTERY DISEASE INVOLVING NATIVE HEART WITH ANGINA PECTORIS, UNSPECIFIED VESSEL OR LESION TYPE (HCC): ICD-10-CM

## 2020-11-07 RX ORDER — VERAPAMIL HYDROCHLORIDE 240 MG/1
TABLET, FILM COATED, EXTENDED RELEASE ORAL
Qty: 90 TABLET | Refills: 3 | Status: SHIPPED | OUTPATIENT
Start: 2020-11-07 | End: 2021-10-28

## 2020-11-16 ENCOUNTER — TELEPHONE (OUTPATIENT)
Dept: FAMILY MEDICINE CLINIC | Facility: CLINIC | Age: 60
End: 2020-11-16

## 2020-11-16 LAB
APPEARANCE UR: CLEAR
BILIRUB UR QL STRIP: NEGATIVE
COLOR UR: YELLOW
GLUCOSE UR QL STRIP: NEGATIVE
HGB UR QL STRIP: NEGATIVE
KETONES UR QL STRIP: NEGATIVE
LEUKOCYTE ESTERASE UR QL STRIP: NEGATIVE
NITRITE UR QL STRIP: NEGATIVE
PH UR STRIP: 7 [PH] (ref 5–8)
PROT UR QL STRIP: NEGATIVE
SP GR UR STRIP: 1.01 (ref 1–1.03)

## 2020-12-21 DIAGNOSIS — K21.00 GASTROESOPHAGEAL REFLUX DISEASE WITH ESOPHAGITIS: ICD-10-CM

## 2020-12-21 RX ORDER — PANTOPRAZOLE SODIUM 40 MG/1
TABLET, DELAYED RELEASE ORAL
Qty: 90 TABLET | Refills: 3 | Status: SHIPPED | OUTPATIENT
Start: 2020-12-21 | End: 2021-12-30

## 2021-01-26 ENCOUNTER — HOSPITAL ENCOUNTER (OUTPATIENT)
Dept: RADIOLOGY | Age: 61
Discharge: HOME | End: 2021-01-26
Payer: COMMERCIAL

## 2021-01-26 VITALS — HEIGHT: 62 IN | BODY MASS INDEX: 27.23 KG/M2 | WEIGHT: 148 LBS

## 2021-01-26 DIAGNOSIS — Z12.31 ENCOUNTER FOR SCREENING MAMMOGRAM FOR MALIGNANT NEOPLASM OF BREAST: ICD-10-CM

## 2021-01-26 PROCEDURE — 77067 SCR MAMMO BI INCL CAD: CPT

## 2021-01-26 PROCEDURE — 77063 BREAST TOMOSYNTHESIS BI: CPT

## 2021-02-06 DIAGNOSIS — I25.10 ATHEROSCLEROSIS OF CORONARY ARTERY OF NATIVE HEART WITHOUT ANGINA PECTORIS, UNSPECIFIED VESSEL OR LESION TYPE: Chronic | ICD-10-CM

## 2021-02-06 DIAGNOSIS — J44.9 MODERATE COPD (CHRONIC OBSTRUCTIVE PULMONARY DISEASE) (HCC): ICD-10-CM

## 2021-02-08 RX ORDER — ALBUTEROL SULFATE 90 UG/1
2 AEROSOL, METERED RESPIRATORY (INHALATION) EVERY 4 HOURS PRN
Qty: 1 INHALER | Refills: 3 | Status: SHIPPED | OUTPATIENT
Start: 2021-02-08 | End: 2021-04-26

## 2021-02-08 RX ORDER — ALBUTEROL SULFATE 2.5 MG/3ML
2.5 SOLUTION RESPIRATORY (INHALATION) EVERY 4 HOURS PRN
Qty: 120 VIAL | Refills: 3 | Status: SHIPPED | OUTPATIENT
Start: 2021-02-08 | End: 2021-12-15 | Stop reason: SDUPTHER

## 2021-03-10 DIAGNOSIS — F41.8 DEPRESSION WITH ANXIETY: ICD-10-CM

## 2021-03-10 RX ORDER — BUPROPION HYDROCHLORIDE 150 MG/1
TABLET, EXTENDED RELEASE ORAL
Qty: 180 TABLET | Refills: 0 | Status: SHIPPED | OUTPATIENT
Start: 2021-03-10 | End: 2021-04-26

## 2021-04-20 ENCOUNTER — APPOINTMENT (OUTPATIENT)
Dept: LAB | Facility: CLINIC | Age: 61
End: 2021-04-20
Payer: COMMERCIAL

## 2021-04-20 DIAGNOSIS — R73.02 IGT (IMPAIRED GLUCOSE TOLERANCE): ICD-10-CM

## 2021-04-20 DIAGNOSIS — I25.10 ATHEROSCLEROSIS OF CORONARY ARTERY OF NATIVE HEART WITHOUT ANGINA PECTORIS, UNSPECIFIED VESSEL OR LESION TYPE: Chronic | ICD-10-CM

## 2021-04-20 DIAGNOSIS — E78.5 HYPERLIPIDEMIA, UNSPECIFIED HYPERLIPIDEMIA TYPE: ICD-10-CM

## 2021-04-20 LAB
ALBUMIN SERPL BCP-MCNC: 3.6 G/DL (ref 3.5–5)
ALP SERPL-CCNC: 103 U/L (ref 46–116)
ALT SERPL W P-5'-P-CCNC: 32 U/L (ref 12–78)
ANION GAP SERPL CALCULATED.3IONS-SCNC: 4 MMOL/L (ref 4–13)
AST SERPL W P-5'-P-CCNC: 17 U/L (ref 5–45)
BACTERIA UR QL AUTO: NORMAL /HPF
BILIRUB SERPL-MCNC: 0.38 MG/DL (ref 0.2–1)
BILIRUB UR QL STRIP: NEGATIVE
BUN SERPL-MCNC: 12 MG/DL (ref 5–25)
CALCIUM SERPL-MCNC: 8.6 MG/DL (ref 8.3–10.1)
CHLORIDE SERPL-SCNC: 110 MMOL/L (ref 100–108)
CHOLEST SERPL-MCNC: 141 MG/DL (ref 50–200)
CLARITY UR: CLEAR
CO2 SERPL-SCNC: 28 MMOL/L (ref 21–32)
COLOR UR: YELLOW
CREAT SERPL-MCNC: 0.79 MG/DL (ref 0.6–1.3)
ERYTHROCYTE [DISTWIDTH] IN BLOOD BY AUTOMATED COUNT: 12.7 % (ref 11.6–15.1)
EST. AVERAGE GLUCOSE BLD GHB EST-MCNC: 114 MG/DL
GFR SERPL CREATININE-BSD FRML MDRD: 81 ML/MIN/1.73SQ M
GLUCOSE P FAST SERPL-MCNC: 105 MG/DL (ref 65–99)
GLUCOSE UR STRIP-MCNC: NEGATIVE MG/DL
HBA1C MFR BLD: 5.6 %
HCT VFR BLD AUTO: 47.2 % (ref 34.8–46.1)
HDLC SERPL-MCNC: 54 MG/DL
HGB BLD-MCNC: 15.5 G/DL (ref 11.5–15.4)
HGB UR QL STRIP.AUTO: NEGATIVE
HYALINE CASTS #/AREA URNS LPF: NORMAL /LPF
KETONES UR STRIP-MCNC: NEGATIVE MG/DL
LDLC SERPL CALC-MCNC: 76 MG/DL (ref 0–100)
LEUKOCYTE ESTERASE UR QL STRIP: NEGATIVE
MCH RBC QN AUTO: 30.1 PG (ref 26.8–34.3)
MCHC RBC AUTO-ENTMCNC: 32.8 G/DL (ref 31.4–37.4)
MCV RBC AUTO: 92 FL (ref 82–98)
NITRITE UR QL STRIP: NEGATIVE
NON-SQ EPI CELLS URNS QL MICRO: NORMAL /HPF
PH UR STRIP.AUTO: 6.5 [PH]
PLATELET # BLD AUTO: 154 THOUSANDS/UL (ref 149–390)
PMV BLD AUTO: 13.1 FL (ref 8.9–12.7)
POTASSIUM SERPL-SCNC: 3.8 MMOL/L (ref 3.5–5.3)
PROT SERPL-MCNC: 7.1 G/DL (ref 6.4–8.2)
PROT UR STRIP-MCNC: NEGATIVE MG/DL
RBC # BLD AUTO: 5.15 MILLION/UL (ref 3.81–5.12)
RBC #/AREA URNS AUTO: NORMAL /HPF
SODIUM SERPL-SCNC: 142 MMOL/L (ref 136–145)
SP GR UR STRIP.AUTO: 1.01 (ref 1–1.03)
TRIGL SERPL-MCNC: 55 MG/DL
TSH SERPL DL<=0.05 MIU/L-ACNC: 2.24 UIU/ML (ref 0.36–3.74)
UROBILINOGEN UR QL STRIP.AUTO: 0.2 E.U./DL
WBC # BLD AUTO: 6.5 THOUSAND/UL (ref 4.31–10.16)
WBC #/AREA URNS AUTO: NORMAL /HPF

## 2021-04-20 PROCEDURE — 80061 LIPID PANEL: CPT

## 2021-04-20 PROCEDURE — 80053 COMPREHEN METABOLIC PANEL: CPT

## 2021-04-20 PROCEDURE — 83036 HEMOGLOBIN GLYCOSYLATED A1C: CPT

## 2021-04-20 PROCEDURE — 36415 COLL VENOUS BLD VENIPUNCTURE: CPT

## 2021-04-20 PROCEDURE — 87086 URINE CULTURE/COLONY COUNT: CPT | Performed by: FAMILY MEDICINE

## 2021-04-20 PROCEDURE — 81001 URINALYSIS AUTO W/SCOPE: CPT | Performed by: FAMILY MEDICINE

## 2021-04-20 PROCEDURE — 84443 ASSAY THYROID STIM HORMONE: CPT

## 2021-04-20 PROCEDURE — 85027 COMPLETE CBC AUTOMATED: CPT

## 2021-04-22 ENCOUNTER — HOSPITAL ENCOUNTER (OUTPATIENT)
Dept: RADIOLOGY | Facility: HOSPITAL | Age: 61
Discharge: HOME | End: 2021-04-22
Payer: COMMERCIAL

## 2021-04-22 DIAGNOSIS — R91.1 SOLITARY PULMONARY NODULE: ICD-10-CM

## 2021-04-22 LAB — BACTERIA UR CULT: NORMAL

## 2021-04-22 PROCEDURE — 71250 CT THORAX DX C-: CPT

## 2021-04-26 ENCOUNTER — OFFICE VISIT (OUTPATIENT)
Dept: FAMILY MEDICINE CLINIC | Facility: CLINIC | Age: 61
End: 2021-04-26
Payer: COMMERCIAL

## 2021-04-26 VITALS
WEIGHT: 154.4 LBS | OXYGEN SATURATION: 98 % | SYSTOLIC BLOOD PRESSURE: 134 MMHG | HEART RATE: 78 BPM | BODY MASS INDEX: 28.41 KG/M2 | HEIGHT: 62 IN | TEMPERATURE: 97.6 F | DIASTOLIC BLOOD PRESSURE: 70 MMHG | RESPIRATION RATE: 18 BRPM

## 2021-04-26 DIAGNOSIS — I25.10 ATHEROSCLEROSIS OF CORONARY ARTERY OF NATIVE HEART WITHOUT ANGINA PECTORIS, UNSPECIFIED VESSEL OR LESION TYPE: Chronic | ICD-10-CM

## 2021-04-26 DIAGNOSIS — J44.9 MODERATE COPD (CHRONIC OBSTRUCTIVE PULMONARY DISEASE) (HCC): Primary | ICD-10-CM

## 2021-04-26 DIAGNOSIS — F41.8 DEPRESSION WITH ANXIETY: ICD-10-CM

## 2021-04-26 DIAGNOSIS — F33.41 RECURRENT MAJOR DEPRESSIVE DISORDER, IN PARTIAL REMISSION (HCC): ICD-10-CM

## 2021-04-26 DIAGNOSIS — F17.210 CIGARETTE NICOTINE DEPENDENCE WITHOUT COMPLICATION: ICD-10-CM

## 2021-04-26 DIAGNOSIS — E78.5 HYPERLIPIDEMIA, UNSPECIFIED HYPERLIPIDEMIA TYPE: ICD-10-CM

## 2021-04-26 PROCEDURE — 99214 OFFICE O/P EST MOD 30 MIN: CPT | Performed by: FAMILY MEDICINE

## 2021-04-26 RX ORDER — BUDESONIDE, GLYCOPYRROLATE, AND FORMOTEROL FUMARATE 160; 9; 4.8 UG/1; UG/1; UG/1
AEROSOL, METERED RESPIRATORY (INHALATION)
Qty: 1 INHALER | Refills: 5 | Status: SHIPPED | OUTPATIENT
Start: 2021-04-26 | End: 2021-04-30

## 2021-04-26 RX ORDER — ALBUTEROL SULFATE 90 UG/1
POWDER, METERED RESPIRATORY (INHALATION)
Qty: 1 EACH | Refills: 5 | Status: SHIPPED | OUTPATIENT
Start: 2021-04-26 | End: 2021-12-15 | Stop reason: SDUPTHER

## 2021-04-26 RX ORDER — BUPROPION HYDROCHLORIDE 300 MG/1
300 TABLET ORAL EVERY MORNING
Qty: 90 TABLET | Refills: 3 | Status: SHIPPED | OUTPATIENT
Start: 2021-04-26 | End: 2022-05-02

## 2021-04-26 NOTE — PROGRESS NOTES
FAMILY PRACTICE OFFICE VISIT       NAME: Richard Roman  AGE: 63 y o  SEX: female       : 1960        MRN: [id number]        Assessment and Plan     Problem List Items Addressed This Visit        Respiratory    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  ([facility] ) - Primary     ·   Patient is under care of [facility]'s pulmonology  · Recent CT chest, results are pending at the time of office visit  · Patient reports chronic dyspnea on exertion, symptoms of COPD were better controlled on Trelegy  Will re-tried is inhaler of covered by her insurance  · Continue albuterol inhaler /RespiClick p r n  Mary Stiles · I strongly advised patient to quit tobacco, she is planning to try nicotine replacement patches         Relevant Medications    fluticasone-umeclidinium-vilanterol (TRELEGY) 200-62 5-25 MCG/INH AEPB inhaler    Albuterol Sulfate (ProAir RespiClick) 010 (90 Base) MCG/ACT AEPB       Cardiovascular and Mediastinum    Coronary atherosclerosis (Chronic)     ·   Patient denies symptoms of angina or unusual dyspnea  · Continue regimen of  Aspirin, Cardizem and Crestor 20 mg daily         Relevant Orders    CBC    Comprehensive metabolic panel    Lipid Panel with Direct LDL reflex    TSH, 3rd generation       Other    Major depressive disorder, recurrent ([facility] )     ·  Patient is doing well on regimen of bupropion  · Change prescription to Wellbutrin  mg daily         Relevant Medications    buPROPion (WELLBUTRIN XL) 300 mg 24 hr tablet    Hyperlipidemia    Cigarette nicotine dependence without complication     ·  Patient is smoking 1 pack daily  · She is planning to start nicotine replacement therapy /patches  · Chantix did not work well in the past            Other Visit Diagnoses     Depression with anxiety        Relevant Medications    buPROPion (WELLBUTRIN XL) 300 mg 24 hr tablet       Patient presents for follow-up of chronic medical conditions    Assessment and plan as outlined above  Patient is waiting for results of CT chest ordered by [facility] pulmonology  I strongly advised her to quit tobacco    Follow-up and labs in 6 months  Patient declines COVID-19 vaccination, counseling provided, I am hopeful she will reconsider  BMI Counseling: Body mass index is 28 24 kg/m²  The BMI is above normal  Nutrition recommendations include encouraging healthy choices of fruits and vegetables, consuming healthier snacks, moderation in carbohydrate intake and reducing intake of cholesterol  Exercise recommendations include exercising 3-5 times per week  There are no Patient Instructions on file for this visit  Discussed with the patient and all questioned fully answered  She will call me if any problems arise  [employer] software was used to dictate this note  It may contain errors with dictating incorrect words/spelling  Please contact provider directly with any questions  Chief Complaint     Chief Complaint   Patient presents with    Follow-up       History of Present Illness      Patient presents for follow-up of chronic medical conditions  She has been feeling well and offers no complaints of chest pain, palpitations or dizziness  Patient admits to occasional shortness of breath with exertion and occasional cough/ wheezing  She is still unfortunately smoking  Patient is under care of [facility] pulmonology, she had recent CT chest, results are pending at the time of office visit  Results of recent blood work reviewed  Normal CBC, CMP and TSH  Hemoglobin is slightly elevated 15 5, LDL is at target at 78  Mammogram normal in January of 2021  Patient is still unfortunately smoking 1 pack daily  Chantix did not work well for her in the past  She is motivated to quit and may consider trial of nicotine replacement patches  Patient has not been following up with [facility] lately, she denies symptoms of angina or unusual dyspnea      She remains on daily medications for hypertension, hyperlipidemia, depression, COPD and acid reflux  Patient uses Wellbutrin  mg tablets and  takes them together in the morning  Patient feels that symptoms of COPD were best controlled on Trelegy, unfortunately this inhaler was highly priced  last yearand we switched her to AirDuo RespiClick  Review of Systems   Review of Systems   Constitutional: Negative  HENT: Negative  Eyes: Negative  Respiratory: Positive for shortness of breath ( chronic dyspnea on exertion) and wheezing (Occasional)  Cardiovascular: Negative  Gastrointestinal: Negative  Endocrine: Negative  Genitourinary: Negative  Musculoskeletal: Negative  Negative for arthralgias  Skin: Negative  Allergic/Immunologic: Negative  Neurological: Negative  Psychiatric/Behavioral: Negative          Active Problem List     Patient Active Problem List   Diagnosis    Coronary atherosclerosis    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  ([facility] )    DDD (degenerative disc disease), lumbar    Major depressive disorder, recurrent (HCC)    Esophageal reflux    Gallbladder polyp    Hyperlipidemia    Low back pain    Myofascial pain syndrome, cervical    Osteopenia    Solitary pulmonary nodule    Vitamin D deficiency    Cigarette nicotine dependence without complication    Hoarseness    Vocal cord leukoplakia    Sensorineural hearing loss (SNHL), bilateral    Oropharyngeal dysphagia    Suri's edema of vocal folds       Past Medical History:  Past Medical History:   Diagnosis Date    COPD (chronic obstructive pulmonary disease) ([address] )     GERD (gastroesophageal reflux disease)     Hypercholesterolemia     Iliotibial band tendonitis     Last Assessed: 6/5/2015    Migraine     Prinzmetal angina (HCC)     Last Assessed: 5/23/2017    Sciatica     Last Assessed: 11/10/2014    Tobacco abuse 4/19/2018    Vertigo     Last Assessed: 4/12/2017       Past Surgical History:  Past Surgical History:   Procedure Laterality Date    COLONOSCOPY  06/2011    Complete: Dr Leo Sawyer - due in 5 years, Colonoscopy Nov 2017, Diverticulosis, 5 mm polyp, Due in 5 years    ESOPHAGOGASTRODUODENOSCOPY      Diagnostic; Last Assessed: 7/11/2014    FLEXOR TENDON REPAIR      left finger    HYSTERECTOMY      @ ag[facility]    MN LARYNGOSCOPY,DIRECT,DIAGNOSTIC N/A 5/16/2019    Procedure: LARYNGOSCOPY DIRECT, FLEXIBLE BRONCHOSCOPY, FLEXIBLE ESOPHAGOSCOPY;  Surgeon: Buzz Stoll MD;  [facility]: [facility];  Service: ENT    SHOULDER ARTHROSCOPY Right     SHOULDER SURGERY      TOTAL ABDOMINAL HYSTERECTOMY W/ BILATERAL SALPINGOOPHORECTOMY      endometriosis;  Last Assessed: 5/4/2015       Family History:  Family History   Problem Relation Age of Onset   Rebecca Rhody Lung cancer Mother 47    Other Father         Dyslipidemia    Diabetes Sister     Hypertension Family     Ovarian cancer Maternal Grandmother 48    No Known Problems Daughter     No Known Problems Maternal Grandfather     Lung cancer Paternal Grandmother     No Known Problems Paternal Grandfather     No Known Problems Sister     No Known Problems Sister     No Known Problems Daughter     No Known Problems Maternal Aunt     No Known Problems Paternal Aunt     No Known Problems Paternal Aunt     No Known Problems Paternal Aunt        Social History:  Social History     Socioeconomic History    Marital status: /Civil Union     Spouse name: Not on file    Number of children: Not on file    Years of education: Not on file    Highest education level: Not on file   Occupational History    Not on file   Social Needs    Financial resource strain: Not on file    Food insecurity     Worry: Not on file     Inability: Not on file    Transportation needs     Medical: Not on file     Non-medical: Not on file   Tobacco Use    Smoking status: Current Every Day Smoker     Packs/day: 0 50 Years: 48 00     Pack years: 24 00     Types: Cigarettes     Start date: 1970    Smokeless tobacco: Never Used   Substance and Sexual Activity    Alcohol use: No    Drug use: No    Sexual activity: Not on file   Lifestyle    Physical activity     Days per week: Not on file     Minutes per session: Not on file    Stress: Not on file   Relationships    Social connections     Talks on phone: Not on file     Gets together: Not on file     Attends Buddhist service: Not on file     Active member of club or organization: Not on file     Attends meetings of clubs or organizations: Not on file     Relationship status: Not on file    Intimate partner violence     Fear of current or ex partner: Not on file     Emotionally abused: Not on file     Physically abused: Not on file     Forced sexual activity: Not on file   Other Topics Concern    Not on file   Social History Narrative    Working full time           Objective     Vitals:    04/26/21 0923 04/26/21 1006   BP: 144/76 134/70   BP Location: Left arm    Patient Position: Sitting    Cuff Size: Adult    Pulse: 78    Resp: 18    Temp: 97 6 °F (36 4 °C)    TempSrc: Temporal    SpO2: 98%    Weight: 70 kg (154 lb 6 4 oz)    Height: 5' 2" (1 575 m)      Wt Readings from Last 3 Encounters:   04/30/21 68 9 kg (152 lb)   04/26/21 70 kg (154 lb 6 4 oz)   01/26/21 67 1 kg (148 lb)       Physical Exam  Vitals signs and nursing note reviewed  Constitutional:       General: She is not in acute distress  Appearance: Normal appearance  She is well-developed  HENT:      Head: Normocephalic and atraumatic  Eyes:      Conjunctiva/sclera: Conjunctivae normal    Neck:      Musculoskeletal: Neck supple  Thyroid: No thyromegaly  Vascular: No carotid bruit  Cardiovascular:      Rate and Rhythm: Normal rate and regular rhythm  Heart sounds: Normal heart sounds  No murmur  Pulmonary:      Effort: Pulmonary effort is normal  No respiratory distress        Breath sounds: Normal breath sounds  No wheezing  Abdominal:      General: Bowel sounds are normal  There is no abdominal bruit  Palpations: Abdomen is soft  Musculoskeletal: Normal range of motion  Right lower leg: No edema  Left lower leg: No edema  Skin:     General: Skin is warm  Findings: No rash  Neurological:      Mental Status: She is alert and oriented to person, place, and time  Cranial Nerves: No cranial nerve deficit  Coordination: Coordination normal    Psychiatric:         Mood and Affect: Mood normal          Behavior: Behavior normal          Thought Content:  Thought content normal          Pertinent Laboratory/Diagnostic Studies:  Lab Results   Component Value Date    BUN 12 04/20/2021    CREATININE 0 79 04/20/2021    CALCIUM 8 6 04/20/2021     01/18/2018    K 3 8 04/20/2021    CO2 28 04/20/2021     (H) 04/20/2021     Lab Results   Component Value Date    ALT 32 04/20/2021    AST 17 04/20/2021    ALKPHOS 103 04/20/2021    BILITOT 0 4 01/18/2018       Lab Results   Component Value Date    WBC 6 50 04/20/2021    HGB 15 5 (H) 04/20/2021    HCT 47 2 (H) 04/20/2021    MCV 92 04/20/2021     04/20/2021       Lab Results   Component Value Date    TSH 2 25 09/17/2020       Lab Results   Component Value Date    CHOL 163 10/31/2017     Lab Results   Component Value Date    TRIG 55 04/20/2021     Lab Results   Component Value Date    HDL 54 04/20/2021     Lab Results   Component Value Date    LDLCALC 76 04/20/2021     Lab Results   Component Value Date    HGBA1C 5 6 04/20/2021       Results for orders placed or performed in visit on 04/20/21   CBC   Result Value Ref Range    WBC 6 50 4 31 - 10 16 Thousand/uL    RBC 5 15 (H) 3 81 - 5 12 Million/uL    Hemoglobin 15 5 (H) 11 5 - 15 4 g/dL    Hematocrit 47 2 (H) 34 8 - 46 1 %    MCV 92 82 - 98 fL    MCH 30 1 26 8 - 34 3 pg    MCHC 32 8 31 4 - 37 4 g/dL    RDW 12 7 11 6 - 15 1 %    Platelets 584 619 - 717 Thousands/uL    MPV 13 1 (H) 8 9 - 12 7 fL   Comprehensive metabolic panel   Result Value Ref Range    Sodium 142 136 - 145 mmol/L    Potassium 3 8 3 5 - 5 3 mmol/L    Chloride 110 (H) 100 - 108 mmol/L    CO2 28 21 - 32 mmol/L    ANION GAP 4 4 - 13 mmol/L    BUN 12 5 - 25 mg/dL    Creatinine 0 79 0 60 - 1 30 mg/dL    Glucose, Fasting 105 (H) 65 - 99 mg/dL    Calcium 8 6 8 3 - 10 1 mg/dL    AST 17 5 - 45 U/L    ALT 32 12 - 78 U/L    Alkaline Phosphatase 103 46 - 116 U/L    Total Protein 7 1 6 4 - 8 2 g/dL    Albumin 3 6 3 5 - 5 0 g/dL    Total Bilirubin 0 38 0 20 - 1 00 mg/dL    eGFR 81 ml/min/1 73sq m   Lipid Panel with Direct LDL reflex   Result Value Ref Range    Cholesterol 141 50 - 200 mg/dL    Triglycerides 55 <=150 mg/dL    HDL, Direct 54 >=40 mg/dL    LDL Calculated 76 0 - 100 mg/dL   Hemoglobin A1C   Result Value Ref Range    Hemoglobin A1C 5 6 Normal 3 8-5 6%; PreDiabetic 5 7-6 4%;  Diabetic >=6 5%; Glycemic control for adults with diabetes <7 0% %     mg/dl   TSH, 3rd generation   Result Value Ref Range    TSH 3RD GENERATON 2 240 0 358 - 3 740 uIU/mL       Orders Placed This Encounter   Procedures    CBC    Comprehensive metabolic panel    Lipid Panel with Direct LDL reflex    TSH, 3rd generation       ALLERGIES:  Allergies   Allergen Reactions    Darvon [Propoxyphene] Itching       Current Medications     Current Outpatient Medications   Medication Sig Dispense Refill    albuterol (2 5 mg/3 mL) 0 083 % nebulizer solution Take 1 vial (2 5 mg total) by nebulization every 4 (four) hours as needed for wheezing or shortness of breath 120 vial 3    aspirin (ECOTRIN LOW STRENGTH) 81 mg EC tablet Take 1 tablet by mouth daily      cyclobenzaprine (FLEXERIL) 10 mg tablet Take 1 tablet at bedtime as needed for muscle spasm 90 tablet 1    nitroglycerin (Nitrostat) 0 4 mg SL tablet Place 1 tablet (0 4 mg total) under the tongue every 5 (five) minutes as needed for chest pain 30 tablet 1    pantoprazole (PROTONIX) 40 mg tablet TAKE 1 TABLET BY MOUTH EVERY DAY (Patient not taking: Reported on 4/30/2021) 90 tablet 3    rosuvastatin (CRESTOR) 20 MG tablet TAKE 1 TABLET DAILY 90 tablet 3    verapamil (CALAN-SR) 240 mg CR tablet TAKE 1 TABLET DAILY 90 tablet 3    Albuterol Sulfate (ProAir RespiClick) 288 (90 Base) MCG/ACT AEPB Two puffs every 4-6 hours as needed for cough/wheeze/shortness of breath 1 each 5    buPROPion (WELLBUTRIN XL) 300 mg 24 hr tablet Take 1 tablet (300 mg total) by mouth every morning 90 tablet 3    famotidine (PEPCID) 40 MG tablet Take 1 tablet by mouth      fluticasone-umeclidinium-vilanterol (TRELEGY) 200-62 5-25 MCG/INH AEPB inhaler Inhale 1 puff daily Rinse mouth after use  (Patient not taking: Reported on 4/30/2021) 1 Inhaler 5    nicotine (NICODERM CQ) 14 mg/24hr TD 24 hr patch Place 1 patch on the skin every 24 hours 28 patch 0    nicotine (NICODERM CQ) 21 mg/24 hr TD 24 hr patch Place 1 patch on the skin every 24 hours 30 patch 0     No current facility-administered medications for this visit          Medications Discontinued During This Encounter   Medication Reason    AirDuo RespiClick 239/79 078-67 MCG/ACT dry powder inhaler     buPROPion (WELLBUTRIN SR) 150 mg 12 hr tablet     albuterol (PROVENTIL HFA,VENTOLIN HFA) 90 mcg/act inhaler        Health Maintenance     Health Maintenance   Topic Date Due    HIV Screening  Never done    COVID-19 Vaccine (1) Never done    DTaP,Tdap,and Td Vaccines (1 - Tdap) Never done    Lung Cancer Screening  Never done    Annual Physical  05/19/2021    BMI: Followup Plan  05/24/2021    Depression Remission PHQ  10/26/2021    BMI: Adult  04/30/2022    Colonoscopy Surveillance  11/30/2022    MAMMOGRAM  01/26/2023    Colorectal Cancer Screening  11/30/2027    Hepatitis C Screening  Completed    Pneumococcal Vaccine: Pediatrics (0 to 5 Years) and At-Risk Patients (6 to 59 Years)  Completed    Influenza Vaccine  Completed    HIB Vaccine Aged Out    Hepatitis B Vaccine  Aged Out    IPV Vaccine  Aged Out    Hepatitis A Vaccine  Aged Out    Meningococcal ACWY Vaccine  Aged Out    HPV Vaccine  Aged [employer] History   Administered Date(s) Administered    INFLUENZA 08/20/2015, 11/02/2017    Influenza Quadrivalent Preservative Free 3 years and older IM 08/17/2016, 11/02/2017    Influenza, recombinant, quadrivalent,injectable, preservative free 10/30/2018, 10/02/2019, 10/26/2020    Influenza, seasonal, injectable 1960, 09/01/2011, 01/24/2013    Pneumococcal Polysaccharide PPV23 11/15/2006, 11/02/2017       Lisandra Sweet MD

## 2021-04-27 ENCOUNTER — TELEPHONE (OUTPATIENT)
Dept: PULMONOLOGY | Facility: CLINIC | Age: 61
End: 2021-04-27

## 2021-04-27 NOTE — TELEPHONE ENCOUNTER
Radiology called office to report significant findings  She has not been seen since 2019  When would you like her scheduled?

## 2021-04-30 ENCOUNTER — OFFICE VISIT (OUTPATIENT)
Dept: PULMONOLOGY | Facility: CLINIC | Age: 61
End: 2021-04-30
Payer: COMMERCIAL

## 2021-04-30 VITALS
WEIGHT: 152 LBS | OXYGEN SATURATION: 97 % | HEART RATE: 78 BPM | DIASTOLIC BLOOD PRESSURE: 70 MMHG | HEIGHT: 62 IN | TEMPERATURE: 97.8 F | BODY MASS INDEX: 27.97 KG/M2 | SYSTOLIC BLOOD PRESSURE: 138 MMHG

## 2021-04-30 DIAGNOSIS — R91.1 SOLITARY PULMONARY NODULE: ICD-10-CM

## 2021-04-30 DIAGNOSIS — J44.9 MODERATE COPD (CHRONIC OBSTRUCTIVE PULMONARY DISEASE) (HCC): Primary | ICD-10-CM

## 2021-04-30 DIAGNOSIS — F17.210 CIGARETTE NICOTINE DEPENDENCE WITHOUT COMPLICATION: ICD-10-CM

## 2021-04-30 PROCEDURE — 99215 OFFICE O/P EST HI 40 MIN: CPT | Performed by: INTERNAL MEDICINE

## 2021-04-30 RX ORDER — NICOTINE 21 MG/24HR
1 PATCH, TRANSDERMAL 24 HOURS TRANSDERMAL EVERY 24 HOURS
Qty: 28 PATCH | Refills: 0 | Status: SHIPPED | OUTPATIENT
Start: 2021-04-30 | End: 2021-08-05 | Stop reason: SDUPTHER

## 2021-04-30 RX ORDER — NICOTINE 21 MG/24HR
1 PATCH, TRANSDERMAL 24 HOURS TRANSDERMAL EVERY 24 HOURS
Qty: 30 PATCH | Refills: 0 | Status: SHIPPED | OUTPATIENT
Start: 2021-04-30 | End: 2021-07-13 | Stop reason: HOSPADM

## 2021-04-30 NOTE — ASSESSMENT & PLAN NOTE
She will start Trelegy 1 puff once daily-rinse mouth out after each use  Albuterol rescue inhaler to use as needed  She is up-to-date on influenza and pneumonia vaccines  Is deferring on COVID vaccine for now  She is agreeable to enroll in pulmonary rehabilitation, I placed order today

## 2021-04-30 NOTE — ASSESSMENT & PLAN NOTE
I am Concerned with a slow growing lung nodule  While this is not necessarily cancer, with her smoking history, I think we need to biopsy at this time  We will do pulmonary function test   I will present her at Lung tumor Board  I a m  Thinking the best option may be robotic navigational lung biopsy

## 2021-04-30 NOTE — ASSESSMENT & PLAN NOTE
We discussed the importance of stopping smoking, she is planning on stopping with the help of her  his quitting at the same time  She is going to defer on smoking cessation Clinic, but has had success with nicotine patches in the past   I renewed those prescriptions

## 2021-04-30 NOTE — PROGRESS NOTES
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  ([facility] )  She will start Trelegy 1 puff once daily-rinse mouth out after each use  Albuterol rescue inhaler to use as needed  She is up-to-date on influenza and pneumonia vaccines  Is deferring on COVID vaccine for now  She is agreeable to enroll in pulmonary rehabilitation, I placed order today  Solitary pulmonary nodule  I am Concerned with a slow growing lung nodule  While this is not necessarily cancer, with her smoking history, I think we need to biopsy at this time  We will do pulmonary function test   I will present her at Lung tumor Board  I a m  Thinking the best option may be robotic navigational lung biopsy  Cigarette nicotine dependence without complication  We discussed the importance of stopping smoking, she is planning on stopping with the help of her  his quitting at the same time  She is going to defer on smoking cessation Clinic, but has had success with nicotine patches in the past   I renewed those prescriptions  Plan:    Diagnoses and all orders for this visit:    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  ([facility] )  -     Ambulatory Referral to Pulmonary Rehabilitation; Future  -     Complete PFT with post bronchodilator; Future    Solitary pulmonary nodule    Cigarette nicotine dependence without complication  -     nicotine (NICODERM CQ) 14 mg/24hr TD 24 hr patch; Place 1 patch on the skin every 24 hours  -     nicotine (NICODERM CQ) 21 mg/24 hr TD 24 hr patch; Place 1 patch on the skin every 24 hours        Follow-up: 2 months      HPI:  She feels like her breathing is ok until she is climbing stairs - she will stop and catch her breath   Also similar if she is walking a lot or carrying something  No chest pain  She has a cough in the am and she expectorates mucous  No wheeze    She is still smoking 10-15 per day (almost 50 years) Previously more than a pack per day    Her breathing/mucous increases with dairy    She is not using Spiriva anymore  She is taking a generic Advair BID      Review of Systems   Constitutional: Negative for activity change, appetite change and unexpected weight change  HENT: Positive for trouble swallowing  Respiratory: Positive for cough and shortness of breath  Cardiovascular: Negative for chest pain and leg swelling  Gastrointestinal: Negative for nausea and vomiting  Musculoskeletal: Positive for arthralgias  Negative for gait problem  Skin: Negative for rash  Psychiatric/Behavioral: Negative for sleep disturbance  All other systems reviewed and are negative  The following portions of the patient's history were reviewed and updated as appropriate: allergies, current medications, past family history, past medical history, past social history, past surgical history and problem list     VITALS:  Vitals:    04/30/21 1032   BP: 138/70   Pulse: 78   Temp: 97 8 °F (36 6 °C)   SpO2: 97%   Weight: 68 9 kg (152 lb)   Height: 5' 2" (1 575 m)       Physical Exam  General:  Patient is awake, alert, non-toxic and in no acute respiratory distress  Neck: No JVD  CV:  Regular, +S1 and S2, No murmurs, gallops or rubs appreciated  Lungs: Diminished breath sounds without wheeze, rales  Abdomen: Soft, +BS, Non-tender, non-distended  Extremities: No clubbing, cyanosis or edema  Neuro: No focal deficits      Diagnostic Testing:    PFTs: 12/2017 - Moderate obstruction with air trapping and diffusion impairment  [place]/metry:  FEV1/FVC Ratio is 54%  FEV1 is 57% predicted  FVC is 82% predicted  No change with bronchodilators     Lung volumes: Total lung capacity is 127% predicted    Residual volume is 176% predicted      Diffusing capacity:  42% predicted       CBC:  Lab Results   Component Value Date    WBC 6 50 04/20/2021    HGB 15 5 (H) 04/20/2021    HCT 47 2 (H) 04/20/2021    MCV 92 04/20/2021     04/20/2021         BMP:   Lab Results   Component Value Date     01/18/2018    K 3 8 04/20/2021     (H) 04/20/2021    CO2 28 04/20/2021    BUN 12 04/20/2021    CREATININE 0 79 04/20/2021    GLUF 105 (H) 04/20/2021    CALCIUM 8 6 04/20/2021    AST 17 04/20/2021    ALT 32 04/20/2021    ALKPHOS 103 04/20/2021    PROT 7 0 01/18/2018    BILITOT 0 4 01/18/2018    EGFR 81 04/20/2021         Imaging studies:  I have personally reviewed pertinent films in PACS  CT chest 4/22 - slow growing MICHAEL nodule  Emphysema      Bran Mott DO

## 2021-05-02 NOTE — ASSESSMENT & PLAN NOTE
·  Patient is smoking 1 pack daily  · She is planning to start nicotine replacement therapy /patches      · Chantix did not work well in the past

## 2021-05-02 NOTE — ASSESSMENT & PLAN NOTE
·   Patient is under care of [facility]  · Recent CT chest, results are pending at the time of office visit  · Patient reports chronic dyspnea on exertion, symptoms of COPD were better controlled on Trelegy  Will re-tried is inhaler of covered by her insurance  · Continue albuterol inhaler /RespiClick vicky Anand     · I strongly advised patient to quit tobacco, she is planning to try nicotine replacement patches

## 2021-05-02 NOTE — ASSESSMENT & PLAN NOTE
·   Patient denies symptoms of angina or unusual dyspnea      · Continue regimen of  Aspirin, Cardizem and Crestor 20 mg daily

## 2021-05-04 ENCOUNTER — TELEPHONE (OUTPATIENT)
Dept: FAMILY MEDICINE CLINIC | Facility: CLINIC | Age: 61
End: 2021-05-04

## 2021-05-04 NOTE — TELEPHONE ENCOUNTER
[facility] in [facility] called and said Trelegy is not covered by patient's insurance  Pharmacist said alternatives are not listed so he is asking if Trelegy alternatives/generics could be sent over to Pharmacy

## 2021-05-05 DIAGNOSIS — J44.9 MODERATE COPD (CHRONIC OBSTRUCTIVE PULMONARY DISEASE) (HCC): Primary | ICD-10-CM

## 2021-05-27 ENCOUNTER — HOSPITAL ENCOUNTER (OUTPATIENT)
Dept: PULMONOLOGY | Facility: HOSPITAL | Age: 61
Discharge: HOME | End: 2021-05-27
Attending: INTERNAL MEDICINE
Payer: COMMERCIAL

## 2021-05-27 DIAGNOSIS — J44.9 MODERATE COPD (CHRONIC OBSTRUCTIVE PULMONARY DISEASE) (HCC): ICD-10-CM

## 2021-05-27 PROCEDURE — 94729 DIFFUSING CAPACITY: CPT

## 2021-05-27 PROCEDURE — 94729 DIFFUSING CAPACITY: CPT | Performed by: INTERNAL MEDICINE

## 2021-05-27 PROCEDURE — 94726 PLETHYSMOGRAPHY LUNG VOLUMES: CPT

## 2021-05-27 PROCEDURE — 94060 EVALUATION OF WHEEZING: CPT | Performed by: INTERNAL MEDICINE

## 2021-05-27 PROCEDURE — 94726 PLETHYSMOGRAPHY LUNG VOLUMES: CPT | Performed by: INTERNAL MEDICINE

## 2021-05-27 PROCEDURE — 94060 EVALUATION OF WHEEZING: CPT

## 2021-05-27 PROCEDURE — 94760 N-INVAS EAR/PLS OXIMETRY 1: CPT

## 2021-05-27 RX ORDER — ALBUTEROL SULFATE 2.5 MG/3ML
2.5 SOLUTION RESPIRATORY (INHALATION) ONCE
Status: COMPLETED | OUTPATIENT
Start: 2021-05-27 | End: 2021-05-27

## 2021-05-27 RX ADMIN — ALBUTEROL SULFATE 2.5 MG: 2.5 SOLUTION RESPIRATORY (INHALATION) at 13:05

## 2021-06-08 ENCOUNTER — DOCUMENTATION (OUTPATIENT)
Dept: PULMONOLOGY | Facility: CLINIC | Age: 61
End: 2021-06-08

## 2021-06-08 ENCOUNTER — TELEPHONE (OUTPATIENT)
Dept: PULMONOLOGY | Facility: CLINIC | Age: 61
End: 2021-06-08

## 2021-06-08 DIAGNOSIS — R91.1 LEFT UPPER LOBE PULMONARY NODULE: Primary | ICD-10-CM

## 2021-06-09 NOTE — TELEPHONE ENCOUNTER
Asked by Dr Rousseau to review chest CT for possible Navigational bronchoscopy  MICHAEL nodule should be accessible via this approach  Would perform chest CT with stef protocol to ensure target is accessible  I have placed the order for CT  Once I have received confirmation from Dr Eva Delgado that patient wishes to proceed, will have the office schedule the study

## 2021-06-14 ENCOUNTER — TELEPHONE (OUTPATIENT)
Dept: PULMONOLOGY | Facility: MEDICAL CENTER | Age: 61
End: 2021-06-14

## 2021-06-14 ENCOUNTER — PREP FOR PROCEDURE (OUTPATIENT)
Dept: PULMONOLOGY | Facility: CLINIC | Age: 61
End: 2021-06-14

## 2021-06-14 ENCOUNTER — PREP FOR PROCEDURE (OUTPATIENT)
Dept: PULMONOLOGY | Facility: MEDICAL CENTER | Age: 61
End: 2021-06-14

## 2021-06-14 DIAGNOSIS — R91.1 LEFT UPPER LOBE PULMONARY NODULE: ICD-10-CM

## 2021-06-14 DIAGNOSIS — R91.1 SOLITARY PULMONARY NODULE: Primary | ICD-10-CM

## 2021-06-14 RX ORDER — ALBUTEROL SULFATE 2.5 MG/3ML
2.5 SOLUTION RESPIRATORY (INHALATION) ONCE
Status: CANCELLED | OUTPATIENT
Start: 2021-07-06

## 2021-06-14 NOTE — TELEPHONE ENCOUNTER
Spoke with patient  She agrees with proceeding with navigational bronchoscopy  She will undergo medication CT protocol  Plan to perform the procedure on July 6th

## 2021-06-19 ENCOUNTER — HOSPITAL ENCOUNTER (OUTPATIENT)
Dept: RADIOLOGY | Facility: HOSPITAL | Age: 61
Discharge: HOME | End: 2021-06-19
Attending: INTERNAL MEDICINE
Payer: COMMERCIAL

## 2021-06-19 DIAGNOSIS — R91.1 LEFT UPPER LOBE PULMONARY NODULE: ICD-10-CM

## 2021-06-19 PROCEDURE — 71250 CT THORAX DX C-: CPT

## 2021-06-19 PROCEDURE — G1004 CDSM NDSC: HCPCS

## 2021-06-28 ENCOUNTER — TELEPHONE (OUTPATIENT)
Dept: PULMONOLOGY | Facility: CLINIC | Age: 61
End: 2021-06-28

## 2021-06-28 ENCOUNTER — CLINICAL SUPPORT (OUTPATIENT)
Dept: PULMONOLOGY | Facility: CLINIC | Age: 61
End: 2021-06-28
Payer: COMMERCIAL

## 2021-06-28 ENCOUNTER — TELEPHONE (OUTPATIENT)
Dept: PREADMISSION TESTING | Facility: HOSPITAL | Age: 61
End: 2021-06-28

## 2021-06-28 DIAGNOSIS — J44.9 MODERATE COPD (CHRONIC OBSTRUCTIVE PULMONARY DISEASE) (HCC): ICD-10-CM

## 2021-06-28 NOTE — PROGRESS NOTES
Pulmonary Rehabilitation Plan of Care   Initial Care Plan      Today's date: 2021   # of Exercise Sessions Completed: 1  Patient name: Quentin Patel      : 1960  Age: 57 y o  MRN: [id number]  Referring Physician: Amado Rodriguez DO  Pulmonologist: Yayo Bustamante DO  Provider: [facility]  Clinician: Michael Bobby MS, CEP    Dx:   Encounter Diagnosis   Name Primary?  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  ([address] )      Date of onset: 2021      SUMMARY OF PROGRESS:  Today is Juancarlos Mark initial evaluation to begin pulmonary rehab for moderate COPD  The patient does not currently follow a formal exercise program at home  She has not resumed all ADLs reporting dyspnea with activity  Depression screening using the PHQ-9 interprets the patient's score 1-4 = Minimal Depression  AURELIA-7 screening tool for anxiety suggests 0-4  = Not anxious  When addressed, the patient denies having depression/anxiety  Patient reports excellent social/emotional support  Information to begin using [employer] was provided as well as contact information for counseling through [employer]  PHQ-9 score will be reassessed in 30 days  The patient is a smoker (1ppd x 50 years)  She wants to quit, was encouraged to consider barriers to quitting, is using nicotine replacement and has switched to limited nicotin use via e-cig  Patient admits to 100% medication compliance  Patient reports the following physical limitations: R shoulder cuff problems  The patient completed an initial 6MWT  The patient walked  1320 feet/2 9 METs  Resting  /62 with appropriate hemodynamic response to exercise reaching 128/62  She was on Room Air with resting SPO2 at 93-96% and O2 desat to 92% during exercise  She will remain on Room air but will be given supplemental O2 if SPO2 drops below 90%  Patient denied symptoms during exercise  Telemetry revealed NSR    Patient was counseled on exercise guidelines to achieve a minimum of 150 mins/wk of moderate intensity (RPE 4-6) exercise and encouraged to add 1-2 days of exercise on opposite days of cardiac rehab as tolerated  We discussed current dietary habits and goals of heart healthy eating for lipid management and weight loss  Patient's goals include: decrease SOB and increase ADLS  Her education will focus on lifestyle modification/education specific to Her needs  Patient will attend group education classes pulmonary disease  Patient will attend 24 exercise sessions, 2x/wk for 12-18 weeks beginning 2021         Medication compliance: Yes   Comments: None   Fall Risk: Low   Comments: None     Smoking: Current user:  average ppd:  1 - e-cig    RPD @ Rest: 0/10  RPD w Exercise 3/10    Assessment of progression of lung disease and functional status:  CAT:   Shortness of breath questionnaire: 40/120      EXERCISE ASSESSMENT and PLAN    Current Exercise Program in Rehab:       Frequency: 2 days/week        Minutes: 30         METS: 2 5-3 5              SpO2: >90%              RPD: 1-3                      HR:    RPE: 4-5         Modalities: Treadmill, UBE and Recumbent bike      Exercise Progression 30 Day Goals :    Frequency: 2 days/week        Minutes: 30-40         METS: 2 5-3 5              SpO2: >90%              RPD: 1-3                      HR:    RPE: 4-5        Modalities: Treadmill, UBE and Recumbent bike     Strength trainin-3 days / week  12-15 repetitions  1-2 sets per modality    Modalities: Leg Press, Chest Press, Pull Downs, Arm Curl and Seated Row    Oxygen Needs: on room air at rest and on room air with exercise  Oxygen Goal: Maintain SpO2>90% during exercise    Home Exercise: none  Education: pursed lipped breathing, diaphragmatic breathing, home exercise, benefits of exercise for pulmonary disease, RPE scale, RPD scale, O2 saturation monitoring, appropriate O2 response to exercise and energy conservation    Goals: reduced score on  USCD Shortness of Breath Questionnaire, Improved 6MW distance by 10%, reduced dyspnea during exercise (0-3/10), improved exercise tolerance (max METs tolerated in pulmonary rehab), SpO2 >90% during exercise, reduced score on CAT, reduced number of COPD exacerbations, reduced RPD at rest, attend pulmonary rehab regularly, decrease sitting time at home, start a walking program and improved [facility]  Progressing:  Reviewed Pt goals and determined plan of care    Plan: Titrate supplemental oxygen as needed to maintain SpO2>90% with exercise, learn to conserve energy with ADLs , practice breathing techniques 3x/day and reduce time sitting at home    Readiness to change: Preparation:  (Getting ready to change)       NUTRITION ASSESSMENT AND PLAN    Weight control:    Starting weight: 152 6 lbs    Current weight:       Diabetes: N/A    Goals:reduced BMI to < 25, Improved Rate Your Plate score  >45, 1 2-2%  wt loss, increase intake of fish, shellfish, cook without added fat or use vegetable oil/spray, increase intake of meatless meals, use low fat dairy, reduce cheese intake or use reduced-fat, eat 3 or more servings of whole grains a day, Eat 4-5 cups of fruits and vegetables daily, choose low sodium processed foods, eliminate butter, choose healthy snacks: light popcorn, plain pretzels, seldom eat or choose low fat ice-cream, fruit juice bars or frozen yogurt , eliminate or choose low-fat sweets and seldom eat out or choose lower fat menu items  Education: heart healthy eating  low sodium diet  hydration  nutrition for  lipid management  wt  loss   healthy choices while dining out  portion control  Progressing:Reviewed Pt goals and determined plan of care  Plan: replace butter with soft spreads made with olive oil, canola or yogurt, replace unhealthy snacks with fruits & vegs, remove salt shaker from table, use salt substitute like Mrs   Dash, increase utilization of fresh or dried herbs, will replace sugar sweetened cereals with whole grain or oatmeal and keep added daily sugar <25g/day  Readiness to change: Preparation:  (Getting ready to change)       PSYCHOSOCIAL ASSESSMENT AND PLAN    Emotional:  Depression assessment:  PHQ-9 = 1-4 = Minimal Depression            Anxiety measure:  AURELIA-7 = 0-4  = Not anxious  Self-reported stress level: 1   Social support: Excellent    Goals:  Daily Activity in [facility] Score < 3, Pain in [facility] Score < 3, Overall Health in [facility] Score < 3, Change in Health in [place] Score < 3 , improved positive thoughts of well being, increased energy and take time to relax  Education: signs/sxs of depression, benefits of a positive support system and stress management techniques    Progressing:Reviewed Pt goals and determined plan of care  Plan: Class: Stress and Your Health, Class: Relaxation, Practice relaxation techniques, Exercise and Enjoy a hobby  Readiness to change: Preparation:  (Getting ready to change)       OTHER CORE COMPONENTS     Tobacco:   Social History     Tobacco Use   Smoking Status Current Every Day Smoker    Packs/day: 0 50    Years: 48 00    Pack years: 24 00    Types: Cigarettes    Start date:    Smokeless Tobacco Never Used       Tobacco Use Intervention: Referral to tobacco expert:   Brief counseling by cardiac rehab professional  Date: 2021  Pt continues to smoke 1 ppd via e-cig   Pt is using nicotine replacement    Blood pressure:    Restin/62   Exercise: 128/62    Goals: consistent BP < 130/80, reduced dietary sodium <2300mg, moderate intensity exercise >150 mins/wk and medication compliance  Education:  pathophysiology of pulmonary disease, dangers of smoking, inspiratory muscle training, environmental triggers and traveling with pulmonary disease  Progressing:Reviewed Pt goals and determined plan of care, Will continue to educate and progress as tolerated    Plan: engage in regular exercise, eliminate salt shaker at the table, use salt substitutes and check labels for sodium content  Readiness to change: Preparation:  (Getting ready to change)

## 2021-06-28 NOTE — PROGRESS NOTES
PULMONARY REHAB ASSESSMENT    Today's date: 2021  Patient name: Janna Khalil     : 1960       MRN: [id number]  PCP: Brittnee Soto MD  Referring Physician: Ryan Godfrey DO  Pulmonologist: Yury Wells DO    Dx: moderate COPD      Date of onset: 2021  Cultural needs: None    Weight:    Wt Readings from Last 1 Encounters:   21 68 9 kg (152 lb)      Height:   Ht Readings from Last 1 Encounters:   21 5' 2" (1 575 m)     Medical History:   Past Medical History:   Diagnosis Date    [facility])     GERD (gastroesophageal reflux disease)     Hypercholesterolemia     Iliotibial band tendonitis     Last Assessed: 2015    Migraine     Prinzmetal angina ([facility])     Last Assessed: 2017    Sciatica     Last Assessed: 11/10/2014    Tobacco abuse 2018    Vertigo     Last Assessed: 2017         Physical Limitations: none     Oxygen needs: Room Air     Risk Factors   Cholesterol: Yes  Smoking: Current user:  average ppd:  1, switched to e-cigs 1 month ago   As well as nicoderm patch smoking 50yrs  HTN: No  DM: No  Obesity: No   Inactivity: No  Stress:  perceived  stress: 1/10   Stressors: reports none    Goals for Stress Management: exercise     Family History:  Family History   Problem Relation Age of Onset    Lung cancer Mother 47    Other Father         Dyslipidemia    Diabetes Sister     Hypertension Family     Ovarian cancer Maternal Grandmother 48    No Known Problems Daughter     No Known Problems Maternal Grandfather     Lung cancer Paternal Grandmother     No Known Problems Paternal Grandfather     No Known Problems Sister     No Known Problems Sister     No Known Problems Daughter     No Known Problems Maternal Aunt     No Known Problems Paternal Aunt     No Known Problems Paternal Aunt     No Known Problems Paternal Aunt        Allergies: Darvon [propoxyphene]  ETOH:   Social History Substance and Sexual Activity   Alcohol Use No         Current Medications:   Current Outpatient Medications   Medication Sig Dispense Refill    albuterol (2 5 mg/3 mL) 0 083 % nebulizer solution Take 1 vial (2 5 mg total) by nebulization every 4 (four) hours as needed for wheezing or shortness of breath 120 vial 3    Albuterol Sulfate (ProAir RespiClick) 050 (90 Base) MCG/ACT AEPB Two puffs every 4-6 hours as needed for cough/wheeze/shortness of breath 1 each 5    aspirin (ECOTRIN LOW STRENGTH) 81 mg EC tablet Take 1 tablet by mouth daily      buPROPion (WELLBUTRIN XL) 300 mg 24 hr tablet Take 1 tablet (300 mg total) by mouth every morning 90 tablet 3    cyclobenzaprine (FLEXERIL) 10 mg tablet Take 1 tablet at bedtime as needed for muscle spasm 90 tablet 1    famotidine (PEPCID) 40 MG tablet Take 1 tablet by mouth      fluticasone-salmeterol (Wixela Inhub) 500-50 mcg/dose inhaler Inhale 1 puff 2 (two) times a day Rinse mouth after use  1 Inhaler 5    nicotine (NICODERM CQ) 14 mg/24hr TD 24 hr patch Place 1 patch on the skin every 24 hours 28 patch 0    nicotine (NICODERM CQ) 21 mg/24 hr TD 24 hr patch Place 1 patch on the skin every 24 hours 30 patch 0    nitroglycerin (Nitrostat) 0 4 mg SL tablet Place 1 tablet (0 4 mg total) under the tongue every 5 (five) minutes as needed for chest pain 30 tablet 1    pantoprazole (PROTONIX) 40 mg tablet TAKE 1 TABLET BY MOUTH EVERY DAY (Patient not taking: Reported on 4/30/2021) 90 tablet 3    rosuvastatin (CRESTOR) 20 MG tablet TAKE 1 TABLET DAILY 90 tablet 3    tiotropium (SPIRIVA RESPIMAT) 2 5 MCG/ACT AERS inhaler Inhale 2 puffs daily 4 g 5    verapamil (CALAN-SR) 240 mg CR tablet TAKE 1 TABLET DAILY 90 tablet 3     No current facility-administered medications for this visit           Functional Status Prior to Diagnosis for Treatment   Occupation: retired  Recreation: puzzles, read, gardening   ADLs: Capable of performing light to moderate ADLs limited by Dyspnea  [place]: able to perform self-care resumed driving  Exercise: none   Other: None     Current Functional Status  Occupation: retired  Recreation: puzzles, reading, gardening  ADLs:Capable of performing light ADLs only limited by Dyspnea  [place]: able to perform self-care resumed driving  Exercise: none   Other: None     Patient Specific Goals:  Decrease SOB, increase ADLs     Short Term Program Goals: dietary modifications increased strength improved energy/stamina with ADLs exercise 120-150 mins/wk return to work    Long Term Goals: increased maximal walking duration  increased intial training workload  Improved [facility] Activity Status score  Improved functional capacity  Improved Quality of Life - [facility] score reduced  Improved lipid profile  Reduced body fat%  Reduced waist circumference  Abstain from smoking  Improved fasting glucose  improved Rate Your Plate Score    Oxygen Goals: Maintain SpO2>90% titrating supplemental oxygen as needed     Ability to reach goals/rehabilitation potential:  Excellent    Projected return to function: 12 weeks  Objective tests: 6 MWT      Nutritional   Reviewed details of Rate your Plate  Discussed key elements of heart healthy eating  Reviewed patient goals for dietary modifications and their clinical implications  Reviewed most recent lipid profile  Goals for dietary modification: choose lean cuts of meat  eliminate processed meats  more meatless meals  low fat dairy   increase whole grains  increase fruits and vegetables  eliminate butter  low sodium  improved snack choices  more nuts/seeds  heathier choices while dining out      Emotional/Social  Patient reports he/she is coping well with good social support and denies depression or anxiety    SOCIAL SUPPORT NETWORK    Marital status:       Domestic Violence Screening: No    Comments: Hx of Anginal - carries nitro, not taking trelegy   COPD moderate, current smoker 1ppd ecigs

## 2021-06-29 NOTE — TELEPHONE ENCOUNTER
2nd VM left to schedule procedure
Dr Lit Hussein  will be performing a Tye on [facility] on 7/6/2021     LOCATION: [place]     ANTICOAGULATION INSTRUCTIONS: n/a    OTHER MEDICATION INSTRUCTIONS: May take all medications with small sips of water     LABS: (CBC/PT/PTT in last 90 days): CMP 4/20   (If no, labs ordered / to be done at least one day prior to procedure)    LAST OFFICE NOTE/H&P within 30 days of procedure: patient seeing Dr Karly Rico 6/30    INSTRUCTIONS GIVEN: patient advised to have a  to and from the procedure  Patient to Be NPO from midnight the night prior and will also receive time of arrival the night prior       Orville Dick
MEENU to schedule Tye for 7/6 with abraham
 used

## 2021-06-30 ENCOUNTER — CLINICAL SUPPORT (OUTPATIENT)
Dept: PULMONOLOGY | Facility: CLINIC | Age: 61
End: 2021-06-30
Payer: COMMERCIAL

## 2021-06-30 ENCOUNTER — OFFICE VISIT (OUTPATIENT)
Dept: PULMONOLOGY | Facility: CLINIC | Age: 61
End: 2021-06-30
Payer: COMMERCIAL

## 2021-06-30 VITALS
RESPIRATION RATE: 18 BRPM | HEART RATE: 77 BPM | BODY MASS INDEX: 27.86 KG/M2 | WEIGHT: 151.4 LBS | HEIGHT: 62 IN | DIASTOLIC BLOOD PRESSURE: 80 MMHG | SYSTOLIC BLOOD PRESSURE: 136 MMHG | OXYGEN SATURATION: 97 % | TEMPERATURE: 98.3 F

## 2021-06-30 DIAGNOSIS — R91.1 SOLITARY PULMONARY NODULE: ICD-10-CM

## 2021-06-30 DIAGNOSIS — B37.0 THRUSH OF MOUTH AND ESOPHAGUS (HCC): ICD-10-CM

## 2021-06-30 DIAGNOSIS — J44.9 MODERATE COPD (CHRONIC OBSTRUCTIVE PULMONARY DISEASE) (HCC): ICD-10-CM

## 2021-06-30 DIAGNOSIS — B37.81 THRUSH OF MOUTH AND ESOPHAGUS (HCC): ICD-10-CM

## 2021-06-30 DIAGNOSIS — J44.9 MODERATE COPD (CHRONIC OBSTRUCTIVE PULMONARY DISEASE) (HCC): Primary | ICD-10-CM

## 2021-06-30 DIAGNOSIS — F17.210 CIGARETTE NICOTINE DEPENDENCE WITHOUT COMPLICATION: ICD-10-CM

## 2021-06-30 PROCEDURE — 1036F TOBACCO NON-USER: CPT | Performed by: INTERNAL MEDICINE

## 2021-06-30 PROCEDURE — G0424 PULMONARY REHAB W EXER: HCPCS

## 2021-06-30 PROCEDURE — 99214 OFFICE O/P EST MOD 30 MIN: CPT | Performed by: INTERNAL MEDICINE

## 2021-06-30 RX ORDER — TIOTROPIUM BROMIDE AND OLODATEROL 3.124; 2.736 UG/1; UG/1
2 SPRAY, METERED RESPIRATORY (INHALATION) DAILY
Qty: 4 G | Refills: 6 | Status: SHIPPED | OUTPATIENT
Start: 2021-06-30 | End: 2021-07-15

## 2021-06-30 NOTE — ASSESSMENT & PLAN NOTE
She has developed thrush on her posterior pharynx likely related to Fidelina nKowlesie  Will stop Wixela and switch to [employer]  If not covered by her insurance, when we restart Wixela I would start at the 250/50 dose  I have prescribed nystatin for 5 days to treat the thrush

## 2021-06-30 NOTE — PROGRESS NOTES
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)  Overall she is doing well  I would like to switch her from [employer] to [employer]  Hopefully now that she has met her insurance deductible the cost will be more acceptable  She will continue with pulmonary rehabilitation  She is going to hold off on COVID vaccine at this time  She is working on smoking cessation  Cigarette nicotine dependence without complication  We discussed that vaping could lead to increased nicotine use  She is going to work on a taper off of the vape over the next several weeks  She can continue to use nicotine patch  I told her there are other online resources      Plan:    Diagnoses and all orders for this visit:    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)    Cigarette nicotine dependence without complication    Solitary pulmonary nodule    Thrush of mouth and esophagus (HCC)  -     nystatin (MYCOSTATIN) 500,000 units/5 mL suspension; Apply 5 mL (500,000 Units total) to the mouth or throat 4 (four) times a day for 5 days  -     tiotropium-olodaterol (Stiolto Respimat) 2 5-2 5 MCG/ACT inhaler; Inhale 2 puffs daily        Follow-up: 3 months      HPI:  After our last visit the patient started Wixela 1 puff twice daily for COPD (she was unable to afford Trelegy or Spiriva)  She has started with pulmonary rehabilitation this week    She feels like she is doing alright  Still with some dyspnea on exertion - when working in her yard or walking up a hill  If she stops to catch her breath she recovers quickly  Some wheezing in the am resolves with coughing up phlegm  She has noticed a dry throat and some white plaques since starting Phineas Idol  She is trying to quit smoking, unfortunately she started vaping nicotine and using a nicotine patch to help with smoking cessation  She has an occasional cigarette  Review of Systems   Constitutional: Negative for activity change  HENT: Positive for mouth sores  Respiratory: Positive for cough, shortness of breath and wheezing  Cardiovascular: Negative for chest pain and leg swelling  Skin: Negative for rash         The following portions of the patient's history were reviewed and updated as appropriate: allergies, current medications, past family history, past medical history, past social history, past surgical history and problem list     VITALS:  Vitals:    06/30/21 0855   BP: 136/80   BP Location: Left arm   Patient Position: Sitting   Cuff Size: Adult   Pulse: 77   Resp: 18   Temp: 98 3 °F (36 8 °C)   TempSrc: Tympanic   SpO2: 97%   Weight: 68 7 kg (151 lb 6 4 oz)   Height: 5' 2" (1 575 m)       Physical Exam  General:  Patient is awake, alert, non-toxic and in no acute respiratory distress  Neck: No JVD  CV:  Regular, +S1 and S2, No murmurs, gallops or rubs appreciated  Lungs:  CTA bilateral without wheeze, rales or rhonci  Abdomen: Soft, +BS, Non-tender, non-distended  Extremities: No clubbing, cyanosis or edema  Neuro: No focal deficits        Diagnostic Testing:    PFTs: 5/27/21  Results:  FEV1/FVC Ratio: 59 %  Forced Vital Capacity: 2/63 L    92 % predicted  FEV1: 1 54 L     65 % predicted     Lung volumes by body plethysmography:   Total Lung Capacity 91 % predicted   Residual volume 80 % predicted     DLCO corrected for patients hemoglobin level: 58 %     Interpretation:     · Moderate obstructive airflow defect      · No significant response to the administration to bronchodilator per ATS Standards     · Normal Lung volumes     · Moderately reduced diffusion capacity    CBC:  Lab Results   Component Value Date    WBC 6 50 04/20/2021    HGB 15 5 (H) 04/20/2021    HCT 47 2 (H) 04/20/2021    MCV 92 04/20/2021     04/20/2021         BMP:   Lab Results   Component Value Date     01/18/2018    K 3 8 04/20/2021     (H) 04/20/2021    CO2 28 04/20/2021    BUN 12 04/20/2021    CREATININE 0 79 04/20/2021    GLUF 105 (H) 04/20/2021    CALCIUM 8 6 04/20/2021    AST 17 04/20/2021    ALT 32 04/20/2021    ALKPHOS 103 04/20/2021    PROT 7 0 01/18/2018    BILITOT 0 4 01/18/2018    EGFR 81 04/20/2021         Imaging studies:  I have personally reviewed pertinent films in PACS  CT Chest 6/19/21  IMPRESSION:     Left upper lobe lung nodule measuring 9 x 8 mm stable in size since 4/22/2021 but slow growing since 10/17/2017  Recommend continued surveillance in 6-9 months      The study was marked in EPIC for significant notification        Berry Henry DO

## 2021-06-30 NOTE — H&P (VIEW-ONLY)
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)  Overall she is doing well  I would like to switch her from [facility] to [employer]  Hopefully now that she has met her insurance deductible the cost will be more acceptable  She will continue with pulmonary rehabilitation  She is going to hold off on COVID vaccine at this time  She is working on smoking cessation  Cigarette nicotine dependence without complication  We discussed that vaping could lead to increased nicotine use  She is going to work on a taper off of the vape over the next several weeks  She can continue to use nicotine patch  I told her there are other online resources      Plan:    Diagnoses and all orders for this visit:    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)    Cigarette nicotine dependence without complication    Solitary pulmonary nodule    Thrush of mouth and esophagus (HCC)  -     nystatin (MYCOSTATIN) 500,000 units/5 mL suspension; Apply 5 mL (500,000 Units total) to the mouth or throat 4 (four) times a day for 5 days  -     tiotropium-olodaterol (Stiolto Respimat) 2 5-2 5 MCG/ACT inhaler; Inhale 2 puffs daily        Follow-up: 3 months      HPI:  After our last visit the patient started Wixela 1 puff twice daily for COPD (she was unable to afford Trelegy or Spiriva)  She has started with pulmonary rehabilitation this week    She feels like she is doing alright  Still with some dyspnea on exertion - when working in her yard or walking up a hill  If she stops to catch her breath she recovers quickly  Some wheezing in the am resolves with coughing up phlegm  She has noticed a dry throat and some white plaques since starting Tammy Bathe  She is trying to quit smoking, unfortunately she started vaping nicotine and using a nicotine patch to help with smoking cessation  She has an occasional cigarette  Review of Systems   Constitutional: Negative for activity change  HENT: Positive for mouth sores  Respiratory: Positive for cough, shortness of breath and wheezing  Cardiovascular: Negative for chest pain and leg swelling  Skin: Negative for rash         The following portions of the patient's history were reviewed and updated as appropriate: allergies, current medications, past family history, past medical history, past social history, past surgical history and problem list     VITALS:  Vitals:    06/30/21 0855   BP: 136/80   BP Location: Left arm   Patient Position: Sitting   Cuff Size: Adult   Pulse: 77   Resp: 18   Temp: 98 3 °F (36 8 °C)   TempSrc: Tympanic   SpO2: 97%   Weight: 68 7 kg (151 lb 6 4 oz)   Height: 5' 2" (1 575 m)       Physical Exam  General:  Patient is awake, alert, non-toxic and in no acute respiratory distress  Neck: No JVD  CV:  Regular, +S1 and S2, No murmurs, gallops or rubs appreciated  Lungs:  CTA bilateral without wheeze, rales or rhonci  Abdomen: Soft, +BS, Non-tender, non-distended  Extremities: No clubbing, cyanosis or edema  Neuro: No focal deficits        Diagnostic Testing:    PFTs: 5/27/21  Results:  FEV1/FVC Ratio: 59 %  Forced Vital Capacity: 2/63 L    92 % predicted  FEV1: 1 54 L     65 % predicted     Lung volumes by body plethysmography:   Total Lung Capacity 91 % predicted   Residual volume 80 % predicted     DLCO corrected for patients hemoglobin level: 58 %     Interpretation:     · Moderate obstructive airflow defect      · No significant response to the administration to bronchodilator per ATS Standards     · Normal Lung volumes     · Moderately reduced diffusion capacity    CBC:  Lab Results   Component Value Date    WBC 6 50 04/20/2021    HGB 15 5 (H) 04/20/2021    HCT 47 2 (H) 04/20/2021    MCV 92 04/20/2021     04/20/2021         BMP:   Lab Results   Component Value Date     01/18/2018    K 3 8 04/20/2021     (H) 04/20/2021    CO2 28 04/20/2021    BUN 12 04/20/2021    CREATININE 0 79 04/20/2021    GLUF 105 (H) 04/20/2021    CALCIUM 8 6 04/20/2021    AST 17 04/20/2021    ALT 32 04/20/2021    ALKPHOS 103 04/20/2021    PROT 7 0 01/18/2018    BILITOT 0 4 01/18/2018    EGFR 81 04/20/2021         Imaging studies:  I have personally reviewed pertinent films in PACS  CT Chest 6/19/21  IMPRESSION:     Left upper lobe lung nodule measuring 9 x 8 mm stable in size since 4/22/2021 but slow growing since 10/17/2017  Recommend continued surveillance in 6-9 months      The study was marked in EPIC for significant notification        Tevin Cruz DO

## 2021-06-30 NOTE — ASSESSMENT & PLAN NOTE
We discussed that vaping could lead to increased nicotine use  She is going to work on a taper off of the vape over the next several weeks  She can continue to use nicotine patch    I told her there are other online resources

## 2021-06-30 NOTE — ASSESSMENT & PLAN NOTE
As nodule has been slowly growing since 2018, plan is for navigational bronchoscopy with biopsy on July 6

## 2021-06-30 NOTE — ASSESSMENT & PLAN NOTE
Overall she is doing well  I would like to switch her from [employer] to [employer]  Hopefully now that she has met her insurance deductible the cost will be more acceptable  She will continue with pulmonary rehabilitation  She is going to hold off on COVID vaccine at this time  She is working on smoking cessation

## 2021-07-02 ENCOUNTER — TELEPHONE (OUTPATIENT)
Dept: PREADMISSION TESTING | Facility: HOSPITAL | Age: 61
End: 2021-07-02

## 2021-07-06 ENCOUNTER — HOSPITAL ENCOUNTER (OUTPATIENT)
Dept: RADIOLOGY | Facility: HOSPITAL | Age: 61
Setting detail: OUTPATIENT SURGERY
Discharge: HOME | End: 2021-07-06
Payer: COMMERCIAL

## 2021-07-06 ENCOUNTER — ANESTHESIA (OUTPATIENT)
Dept: PERIOP | Facility: HOSPITAL | Age: 61
End: 2021-07-06

## 2021-07-06 ENCOUNTER — HOSPITAL ENCOUNTER (OUTPATIENT)
Dept: RADIOLOGY | Facility: HOSPITAL | Age: 61
Discharge: HOME | End: 2021-07-06
Payer: COMMERCIAL

## 2021-07-06 ENCOUNTER — HOSPITAL ENCOUNTER (OUTPATIENT)
Dept: RADIOLOGY | Facility: HOSPITAL | Age: 61
Discharge: HOME | End: 2021-07-06
Attending: INTERNAL MEDICINE
Payer: COMMERCIAL

## 2021-07-06 ENCOUNTER — ANESTHESIA EVENT (OUTPATIENT)
Dept: PERIOP | Facility: HOSPITAL | Age: 61
End: 2021-07-06

## 2021-07-06 ENCOUNTER — HOSPITAL ENCOUNTER (OUTPATIENT)
Dept: PERIOP | Facility: HOSPITAL | Age: 61
Setting detail: OUTPATIENT SURGERY
Discharge: HOME | End: 2021-07-06
Attending: INTERNAL MEDICINE | Admitting: INTERNAL MEDICINE
Payer: COMMERCIAL

## 2021-07-06 VITALS
HEART RATE: 75 BPM | HEIGHT: 62 IN | OXYGEN SATURATION: 92 % | SYSTOLIC BLOOD PRESSURE: 114 MMHG | BODY MASS INDEX: 27.79 KG/M2 | DIASTOLIC BLOOD PRESSURE: 74 MMHG | WEIGHT: 151 LBS | RESPIRATION RATE: 20 BRPM | TEMPERATURE: 96.9 F

## 2021-07-06 DIAGNOSIS — B37.0 THRUSH OF MOUTH AND ESOPHAGUS (HCC): ICD-10-CM

## 2021-07-06 DIAGNOSIS — B37.81 THRUSH OF MOUTH AND ESOPHAGUS (HCC): ICD-10-CM

## 2021-07-06 DIAGNOSIS — R91.1 LEFT UPPER LOBE PULMONARY NODULE: ICD-10-CM

## 2021-07-06 DIAGNOSIS — R07.81 PLEURODYNIA: Primary | ICD-10-CM

## 2021-07-06 PROBLEM — F17.210 CONTINUOUS DEPENDENCE ON CIGARETTE SMOKING: Status: ACTIVE | Noted: 2021-07-06

## 2021-07-06 PROBLEM — Z87.898 HISTORY OF ANESTHESIA COMPLICATIONS: Status: ACTIVE | Noted: 2021-07-06

## 2021-07-06 LAB
INR PPP: 0.96 (ref 0.84–1.19)
PROTHROMBIN TIME: 12.7 SECONDS (ref 11.6–14.5)

## 2021-07-06 PROCEDURE — 31629 BRONCHOSCOPY/NEEDLE BX EACH: CPT | Performed by: INTERNAL MEDICINE

## 2021-07-06 PROCEDURE — 88172 CYTP DX EVAL FNA 1ST EA SITE: CPT | Performed by: PATHOLOGY

## 2021-07-06 PROCEDURE — 71045 X-RAY EXAM CHEST 1 VIEW: CPT

## 2021-07-06 PROCEDURE — 31627 NAVIGATIONAL BRONCHOSCOPY: CPT | Performed by: INTERNAL MEDICINE

## 2021-07-06 PROCEDURE — 88112 CYTOPATH CELL ENHANCE TECH: CPT | Performed by: PATHOLOGY

## 2021-07-06 PROCEDURE — 88173 CYTOPATH EVAL FNA REPORT: CPT | Performed by: PATHOLOGY

## 2021-07-06 PROCEDURE — 87070 CULTURE OTHR SPECIMN AEROBIC: CPT | Performed by: INTERNAL MEDICINE

## 2021-07-06 PROCEDURE — 31624 DX BRONCHOSCOPE/LAVAGE: CPT | Performed by: INTERNAL MEDICINE

## 2021-07-06 PROCEDURE — 31654 BRONCH EBUS IVNTJ PERPH LES: CPT | Performed by: INTERNAL MEDICINE

## 2021-07-06 PROCEDURE — 85610 PROTHROMBIN TIME: CPT | Performed by: INTERNAL MEDICINE

## 2021-07-06 PROCEDURE — 31623 DX BRONCHOSCOPE/BRUSH: CPT | Performed by: INTERNAL MEDICINE

## 2021-07-06 PROCEDURE — 31652 BRONCH EBUS SAMPLNG 1/2 NODE: CPT | Performed by: INTERNAL MEDICINE

## 2021-07-06 RX ORDER — ALBUTEROL SULFATE 2.5 MG/3ML
2.5 SOLUTION RESPIRATORY (INHALATION) ONCE
Status: COMPLETED | OUTPATIENT
Start: 2021-07-06 | End: 2021-07-06

## 2021-07-06 RX ORDER — HYDROMORPHONE HCL IN WATER/PF 6 MG/30 ML
0.2 PATIENT CONTROLLED ANALGESIA SYRINGE INTRAVENOUS
Status: DISCONTINUED | OUTPATIENT
Start: 2021-07-06 | End: 2021-07-06 | Stop reason: HOSPADM

## 2021-07-06 RX ORDER — ONDANSETRON 2 MG/ML
INJECTION INTRAMUSCULAR; INTRAVENOUS AS NEEDED
Status: DISCONTINUED | OUTPATIENT
Start: 2021-07-06 | End: 2021-07-06

## 2021-07-06 RX ORDER — PROPOFOL 10 MG/ML
INJECTION, EMULSION INTRAVENOUS CONTINUOUS PRN
Status: DISCONTINUED | OUTPATIENT
Start: 2021-07-06 | End: 2021-07-06

## 2021-07-06 RX ORDER — LIDOCAINE HYDROCHLORIDE 10 MG/ML
INJECTION, SOLUTION EPIDURAL; INFILTRATION; INTRACAUDAL; PERINEURAL AS NEEDED
Status: DISCONTINUED | OUTPATIENT
Start: 2021-07-06 | End: 2021-07-06

## 2021-07-06 RX ORDER — GLYCOPYRROLATE 0.2 MG/ML
INJECTION INTRAMUSCULAR; INTRAVENOUS AS NEEDED
Status: DISCONTINUED | OUTPATIENT
Start: 2021-07-06 | End: 2021-07-06

## 2021-07-06 RX ORDER — SODIUM CHLORIDE, SODIUM LACTATE, POTASSIUM CHLORIDE, CALCIUM CHLORIDE 600; 310; 30; 20 MG/100ML; MG/100ML; MG/100ML; MG/100ML
INJECTION, SOLUTION INTRAVENOUS CONTINUOUS PRN
Status: DISCONTINUED | OUTPATIENT
Start: 2021-07-06 | End: 2021-07-06

## 2021-07-06 RX ORDER — PROPOFOL 10 MG/ML
INJECTION, EMULSION INTRAVENOUS AS NEEDED
Status: DISCONTINUED | OUTPATIENT
Start: 2021-07-06 | End: 2021-07-06

## 2021-07-06 RX ORDER — IBUPROFEN 600 MG/1
600 TABLET ORAL EVERY 6 HOURS PRN
Qty: 30 TABLET | Refills: 0 | Status: SHIPPED | OUTPATIENT
Start: 2021-07-06 | End: 2021-07-15

## 2021-07-06 RX ORDER — DEXAMETHASONE SODIUM PHOSPHATE 10 MG/ML
INJECTION, SOLUTION INTRAMUSCULAR; INTRAVENOUS AS NEEDED
Status: DISCONTINUED | OUTPATIENT
Start: 2021-07-06 | End: 2021-07-06

## 2021-07-06 RX ORDER — REMIFENTANIL HYDROCHLORIDE 1 MG/ML
INJECTION, POWDER, LYOPHILIZED, FOR SOLUTION INTRAVENOUS CONTINUOUS PRN
Status: DISCONTINUED | OUTPATIENT
Start: 2021-07-06 | End: 2021-07-06

## 2021-07-06 RX ORDER — KETOROLAC TROMETHAMINE 30 MG/ML
15 INJECTION, SOLUTION INTRAMUSCULAR; INTRAVENOUS ONCE
Status: COMPLETED | OUTPATIENT
Start: 2021-07-06 | End: 2021-07-06

## 2021-07-06 RX ORDER — OXYCODONE HYDROCHLORIDE AND ACETAMINOPHEN 5; 325 MG/1; MG/1
2 TABLET ORAL EVERY 4 HOURS PRN
Qty: 60 TABLET | Refills: 0 | Status: SHIPPED | OUTPATIENT
Start: 2021-07-06 | End: 2021-07-16

## 2021-07-06 RX ORDER — MIDAZOLAM HYDROCHLORIDE 2 MG/2ML
INJECTION, SOLUTION INTRAMUSCULAR; INTRAVENOUS AS NEEDED
Status: DISCONTINUED | OUTPATIENT
Start: 2021-07-06 | End: 2021-07-06

## 2021-07-06 RX ORDER — FENTANYL CITRATE 50 UG/ML
INJECTION, SOLUTION INTRAMUSCULAR; INTRAVENOUS AS NEEDED
Status: DISCONTINUED | OUTPATIENT
Start: 2021-07-06 | End: 2021-07-06

## 2021-07-06 RX ORDER — FENTANYL CITRATE/PF 50 MCG/ML
25 SYRINGE (ML) INJECTION
Status: COMPLETED | OUTPATIENT
Start: 2021-07-06 | End: 2021-07-06

## 2021-07-06 RX ORDER — OXYCODONE HYDROCHLORIDE AND ACETAMINOPHEN 5; 325 MG/1; MG/1
2 TABLET ORAL EVERY 4 HOURS PRN
Status: DISCONTINUED | OUTPATIENT
Start: 2021-07-06 | End: 2021-07-10 | Stop reason: HOSPADM

## 2021-07-06 RX ORDER — ONDANSETRON 2 MG/ML
4 INJECTION INTRAMUSCULAR; INTRAVENOUS ONCE AS NEEDED
Status: DISCONTINUED | OUTPATIENT
Start: 2021-07-06 | End: 2021-07-06 | Stop reason: HOSPADM

## 2021-07-06 RX ORDER — METOCLOPRAMIDE HYDROCHLORIDE 5 MG/ML
5 INJECTION INTRAMUSCULAR; INTRAVENOUS ONCE AS NEEDED
Status: DISCONTINUED | OUTPATIENT
Start: 2021-07-06 | End: 2021-07-06 | Stop reason: HOSPADM

## 2021-07-06 RX ORDER — ROCURONIUM BROMIDE 10 MG/ML
INJECTION, SOLUTION INTRAVENOUS AS NEEDED
Status: DISCONTINUED | OUTPATIENT
Start: 2021-07-06 | End: 2021-07-06

## 2021-07-06 RX ORDER — SODIUM CHLORIDE, SODIUM LACTATE, POTASSIUM CHLORIDE, CALCIUM CHLORIDE 600; 310; 30; 20 MG/100ML; MG/100ML; MG/100ML; MG/100ML
75 INJECTION, SOLUTION INTRAVENOUS CONTINUOUS
Status: DISCONTINUED | OUTPATIENT
Start: 2021-07-06 | End: 2021-07-10 | Stop reason: HOSPADM

## 2021-07-06 RX ADMIN — GLYCOPYRROLATE 0.2 MG: 0.2 INJECTION, SOLUTION INTRAMUSCULAR; INTRAVENOUS at 08:21

## 2021-07-06 RX ADMIN — KETOROLAC TROMETHAMINE 15 MG: 30 INJECTION, SOLUTION INTRAMUSCULAR at 12:14

## 2021-07-06 RX ADMIN — PHENYLEPHRINE HYDROCHLORIDE 30 MCG/MIN: 10 INJECTION INTRAVENOUS at 09:10

## 2021-07-06 RX ADMIN — MIDAZOLAM 2 MG: 1 INJECTION INTRAMUSCULAR; INTRAVENOUS at 07:50

## 2021-07-06 RX ADMIN — Medication 25 MCG: at 11:44

## 2021-07-06 RX ADMIN — ROCURONIUM BROMIDE 50 MG: 50 INJECTION, SOLUTION INTRAVENOUS at 07:57

## 2021-07-06 RX ADMIN — ONDANSETRON 4 MG: 2 INJECTION INTRAMUSCULAR; INTRAVENOUS at 10:06

## 2021-07-06 RX ADMIN — Medication 25 MCG: at 11:57

## 2021-07-06 RX ADMIN — PROPOFOL 150 MG: 10 INJECTION, EMULSION INTRAVENOUS at 07:57

## 2021-07-06 RX ADMIN — SODIUM CHLORIDE, SODIUM LACTATE, POTASSIUM CHLORIDE, AND CALCIUM CHLORIDE: .6; .31; .03; .02 INJECTION, SOLUTION INTRAVENOUS at 07:42

## 2021-07-06 RX ADMIN — LIDOCAINE HYDROCHLORIDE 50 MG: 10 INJECTION, SOLUTION EPIDURAL; INFILTRATION; INTRACAUDAL; PERINEURAL at 07:57

## 2021-07-06 RX ADMIN — PROPOFOL 100 MCG/KG/MIN: 10 INJECTION, EMULSION INTRAVENOUS at 08:01

## 2021-07-06 RX ADMIN — ROCURONIUM BROMIDE 10 MG: 50 INJECTION, SOLUTION INTRAVENOUS at 08:58

## 2021-07-06 RX ADMIN — Medication 25 MCG: at 11:49

## 2021-07-06 RX ADMIN — FENTANYL CITRATE 50 MCG: 50 INJECTION INTRAMUSCULAR; INTRAVENOUS at 07:57

## 2021-07-06 RX ADMIN — OXYCODONE HYDROCHLORIDE AND ACETAMINOPHEN 2 TABLET: 5; 325 TABLET ORAL at 14:52

## 2021-07-06 RX ADMIN — ROCURONIUM BROMIDE 10 MG: 50 INJECTION, SOLUTION INTRAVENOUS at 09:30

## 2021-07-06 RX ADMIN — ALBUTEROL SULFATE 2.5 MG: 2.5 SOLUTION RESPIRATORY (INHALATION) at 07:10

## 2021-07-06 RX ADMIN — DEXAMETHASONE SODIUM PHOSPHATE 10 MG: 10 INJECTION, SOLUTION INTRAMUSCULAR; INTRAVENOUS at 08:01

## 2021-07-06 RX ADMIN — Medication 25 MCG: at 12:03

## 2021-07-06 RX ADMIN — SUGAMMADEX 150 MG: 100 INJECTION, SOLUTION INTRAVENOUS at 10:30

## 2021-07-06 RX ADMIN — ALBUTEROL SULFATE 2.5 MG: 2.5 SOLUTION RESPIRATORY (INHALATION) at 11:05

## 2021-07-06 RX ADMIN — REMIFENTANIL HYDROCHLORIDE 0.2 MCG/KG/MIN: 1 INJECTION, POWDER, LYOPHILIZED, FOR SOLUTION INTRAVENOUS at 08:17

## 2021-07-06 NOTE — DISCHARGE INSTRUCTIONS
Flexible Bronchoscopy   WHAT YOU NEED TO KNOW:   A flexible bronchoscopy is a procedure to look inside your respiratory system  Healthcare providers use a bronchoscope, which is a soft, bendable tube with a light and tiny camera on the end  Pictures of your respiratory system appear on a monitor during the procedure  DISCHARGE INSTRUCTIONS:   Medicines:   · Pain medicine: You may be given medicine to take away or decrease pain  Do not wait until the pain is severe before you take your medicine  · Take your medicine as directed  Contact your healthcare provider if you think your medicine is not helping or if you have side effects  Tell him or her if you are allergic to any medicine  Keep a list of the medicines, vitamins, and herbs you take  Include the amounts, and when and why you take them  Bring the list or the pill bottles to follow-up visits  Carry your medicine list with you in case of an emergency  Follow up with your healthcare provider as directed: Ask when you should expect the results of your procedure  Write down your questions so you remember to ask them during your visits  Self-care:   · Do not cough or clear your throat for a few days  This will help prevent bleeding  · Do not smoke  Smoking can cause severe damage to your airway  Ask your healthcare provider for information if you need help quitting  Contact your healthcare provider if:   · You have a fever  · Your pain does not go away, even after you take medicine  · You have new symptoms or your symptoms are worse than before  · You have questions or concerns about your condition or care  Seek care immediately or call 911 if:   · You cannot swallow  · You cough up blood  · You are confused and dizzy or feel like you are going to faint  · You have shortness of breath  · Your lips or nails turn blue      · You have chest pain or feel like your heart is beating faster than normal   © 2017 [facility] [address] [place] [address] is for End User's use only and may not be sold, redistributed or otherwise used for commercial purposes  All illustrations and images included in CareNotes® are the copyrighted property of [employer]  or Ethan Foley  The above information is an  only  It is not intended as medical advice for individual conditions or treatments  Talk to your doctor, nurse or pharmacist before following any medical regimen to see if it is safe and effective for you  Traumatic Pneumothorax   WHAT YOU NEED TO KNOW:   A traumatic pneumothorax is when part of your lung collapses  A traumatic pneumothorax is caused by an injury that tears your lung and allows air to enter the pleural space  This is the area between your lungs and your chest wall  The air trapped in your pleural space prevents your lung from filling with air, which causes it to collapse  A pneumothorax can happen in one or both lungs  Injuries that cause a traumatic pneumothorax include bike accidents, motor vehicle accidents, gunshot wounds, and knife wounds  DISCHARGE INSTRUCTIONS:   Medicines:   · Pain medicine: You may need medicine to take away or decrease pain  ? Learn how to take your medicine  Ask what medicine and how much you should take  Be sure you know how, when, and how often to take it  ? Do not wait until the pain is severe before you take your medicine  Tell your healthcare provider if your pain does not decrease  ? Pain medicine can make you dizzy or sleepy  Prevent falls by calling someone when you get out of bed or if you need help  Antibiotics: This medicine is given to fight or prevent an infection caused by bacteria  Always take your antibiotics exactly as ordered by your healthcare provider  Do not stop taking your medicine unless directed by your healthcare provider   Never save antibiotics or take leftover antibiotics that were given to you for another illness  Take your medicine as directed  Contact your healthcare provider if you think your medicine is not helping or if you have side effects  Tell him or her if you are allergic to any medicine  Keep a list of the medicines, vitamins, and herbs you take  Include the amounts, and when and why you take them  Bring the list or the pill bottles to follow-up visits  Carry your medicine list with you in case of an emergency  Breathing exercises: You may need to do breathing exercises to strengthen your lungs  Ask your healthcare provider how to do these exercises, and how long you should do them  Use the incentive spirometer provided several times per day  Do not smoke: If you smoke, it is never too late to quit  Ask for information about how to stop smoking if you need help  Follow up with your healthcare provider as directed: You may need to return for more chest x-rays  Write down your questions so you remember to ask them during your visits  For your safety:  Do not dive underwater or climb to high altitudes after a pneumothorax  Do not fly if you have an untreated or recurring pneumothorax  The change of pressure could cause another pneumothorax  Ask your healthcare provider when it is safe to fly, dive, or climb to high altitudes  Contact your healthcare provider if:   · You have new or worse signs and symptoms  · You have questions about your condition or care  Seek care immediately or call 911 if:   · You are sweating and feel like you are going to pass out  · Your fingernails, toenails, or lips begin to turn blue  · Your neck veins become larger than usual     · Your throat or the front of your neck is pushed to one side  · You have new or increased shortness of breath  © Copyright [address] Information is for End User's use only and may not be sold, redistributed or otherwise used for commercial purposes   All illustrations and images included in CareNotes® are the copyrighted property of A D A M , Inc  or [facility] [employer]   The above information is an  only  It is not intended as medical advice for individual conditions or treatments  Talk to your doctor, nurse or pharmacist before following any medical regimen to see if it is safe and effective for you

## 2021-07-06 NOTE — PROGRESS NOTES
Patient with c/o left chest/ back pain  Starting to medicate with fentanyl   Dr Rey Valdez notified in OR#8

## 2021-07-06 NOTE — PERIOPERATIVE NURSING NOTE
As per dr Vanda Patino md, patient can go to [facility] at this time to be monitored for another hour  Patient to have ice chips only in [facility] until cleared by dr Vanda Patino md   Will continue to monitor

## 2021-07-06 NOTE — INTERVAL H&P NOTE
H&P reviewed  After examining the patient I find no changes in the patients condition since the H&P had been written  Imaging has been reviewed  There is a slowly enlarging left upper lobe pulmonary nodule  Patient denies significant cough, congestion or wheezing  On exam, she has some scattered bronchial sounds  Vitals:    07/06/21 0702   BP: 112/58   Pulse: 74   Resp: 18   Temp: (!) 97 4 °F (36 3 °C)   SpO2: 97%     Assessment/plan:    Left upper lobe pulmonary     Proceed with bronchoscopy for airway survey, navigational robotic bronchoscopy and endobronchial ultrasound with fine-needle aspiration, biopsy, brushing and bronchoalveolar lavage  Patient understands the risks and benefits  Specifically, we discussed there is a 3% risk of pneumothorax and 1% risk of significant bleeding related to the procedure  Additional potential risks include COPD exacerbation, atelectasis and pneumonia  They understand that diagnostic yield is generally 80-85%  In the event of a nondiagnostic procedure, will need additional testing and/or follow-up  Patient verbalizes understanding and consent has been signed  We will plan to perform postprocedure chest x-ray prior to discharge

## 2021-07-06 NOTE — ANESTHESIA POSTPROCEDURE EVALUATION
Post-Op Assessment Note    CV Status:  Stable  Pain Score: 0    Pain management: adequate     Mental Status:  Alert and awake   Hydration Status:  Euvolemic   PONV Controlled:  Controlled   Airway Patency:  Patent      Post Op Vitals Reviewed: Yes      Staff: CRNA   Comments: VSS, SV        No complications documented      BP   94/61   Temp  97 3   Pulse  81   Resp   11   SpO2   94% RA

## 2021-07-06 NOTE — PROGRESS NOTES
Post-rocedure chest x-ray concerning for small left apical pneumothorax  Patient developed moderate left-sided pleuritic chest pain, worse with deep inspiration  Repeat chest x-ray, showed stable pneumothorax with mild SQ emphysema  In the interim, I also discussed with Dr Conrad Cotton in Interventional Radiology, given the concern the patient may require chest tube placement  Given stability on repeat imaging, decision made to continue observation  Patient was given a dose of IV fentanyl and Toradol with resolution of her pain  Plan to repeat chest x-ray at 2:00 p m  If chest x-ray findings are stable and patient continues to be pain-free, can discharge home  Otherwise, would need continued observation +/- chest tube, should there be progression of the pneumothorax

## 2021-07-06 NOTE — PERIOPERATIVE NURSING NOTE
Dr Munoz at bedside, repeat chest xray completed  Continue to medicate with fentanyl   Will repeat chest xray at 2pm

## 2021-07-06 NOTE — ANESTHESIA PREPROCEDURE EVALUATION
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)      Other   (+) Continuous dependence on cigarette smoking   (+) Thrush of mouth and esophagus (HCC)        Physical Exam    Airway    Mallampati score: III  TM Distance: >3 FB  Neck ROM: full     Dental   upper dentures and lower dentures,     Cardiovascular  Rhythm: regular, Rate: normal,     Pulmonary  No rhonchi, Decreased breath sounds, No wheezes, No rales,     Other Findings        Anesthesia Plan  ASA Score- 3     Anesthesia Type- general with ASA Monitors  Additional Monitors:   Airway Plan: ETT  Comment: Consented for GETA  Possible post op intubation in rare cases  All questitons answered          Plan Factors-Exercise tolerance (METS): <4 METS  Chart reviewed  Existing labs reviewed  Patient summary reviewed  Patient is a current smoker  Patient instructed to abstain from smoking on day of procedure  Patient did not smoke on day of surgery  Obstructive sleep apnea risk education given perioperatively  Induction- intravenous  Postoperative Plan-   Planned trial extubation    Informed Consent- Anesthetic plan and risks discussed with patient  I personally reviewed this patient with the CRNA  Discussed and agreed on the Anesthesia Plan with the CRNA  Jerry Stevens

## 2021-07-07 ENCOUNTER — TELEPHONE (OUTPATIENT)
Dept: PULMONOLOGY | Facility: CLINIC | Age: 61
End: 2021-07-07

## 2021-07-08 LAB
BACTERIA BRONCH AEROBE CULT: NO GROWTH
GRAM STN SPEC: NORMAL
GRAM STN SPEC: NORMAL

## 2021-07-09 ENCOUNTER — HOSPITAL ENCOUNTER (INPATIENT)
Facility: HOSPITAL | Age: 61
LOS: 4 days | Discharge: HOME | DRG: 200 | End: 2021-07-13
Attending: EMERGENCY MEDICINE | Admitting: FAMILY MEDICINE
Payer: COMMERCIAL

## 2021-07-09 ENCOUNTER — APPOINTMENT (INPATIENT)
Dept: RADIOLOGY | Facility: HOSPITAL | Age: 61
DRG: 200 | End: 2021-07-09
Payer: COMMERCIAL

## 2021-07-09 ENCOUNTER — HOSPITAL ENCOUNTER (OUTPATIENT)
Dept: RADIOLOGY | Facility: HOSPITAL | Age: 61
Discharge: HOME | End: 2021-07-09
Attending: INTERNAL MEDICINE
Payer: COMMERCIAL

## 2021-07-09 DIAGNOSIS — J95.811 POSTPROCEDURAL PNEUMOTHORAX: ICD-10-CM

## 2021-07-09 DIAGNOSIS — R06.00 DYSPNEA ON EXERTION: ICD-10-CM

## 2021-07-09 DIAGNOSIS — J95.811 POSTPROCEDURAL PNEUMOTHORAX: Primary | ICD-10-CM

## 2021-07-09 DIAGNOSIS — J44.9 MODERATE COPD (CHRONIC OBSTRUCTIVE PULMONARY DISEASE) (HCC): ICD-10-CM

## 2021-07-09 DIAGNOSIS — B37.0 THRUSH OF MOUTH AND ESOPHAGUS (HCC): ICD-10-CM

## 2021-07-09 DIAGNOSIS — J95.811 PNEUMOTHORAX AFTER BIOPSY: Primary | ICD-10-CM

## 2021-07-09 DIAGNOSIS — B37.81 THRUSH OF MOUTH AND ESOPHAGUS (HCC): ICD-10-CM

## 2021-07-09 LAB
ANION GAP SERPL CALCULATED.3IONS-SCNC: 6 MMOL/L (ref 4–13)
APTT PPP: 25 SECONDS (ref 23–37)
ATRIAL RATE: 73 BPM
BASOPHILS # BLD AUTO: 0.04 THOUSANDS/ΜL (ref 0–0.1)
BASOPHILS NFR BLD AUTO: 0 % (ref 0–1)
BUN SERPL-MCNC: 10 MG/DL (ref 5–25)
CALCIUM SERPL-MCNC: 9.3 MG/DL (ref 8.3–10.1)
CHLORIDE SERPL-SCNC: 108 MMOL/L (ref 100–108)
CO2 SERPL-SCNC: 25 MMOL/L (ref 21–32)
CREAT SERPL-MCNC: 0.7 MG/DL (ref 0.6–1.3)
EOSINOPHIL # BLD AUTO: 0.21 THOUSAND/ΜL (ref 0–0.61)
EOSINOPHIL NFR BLD AUTO: 2 % (ref 0–6)
ERYTHROCYTE [DISTWIDTH] IN BLOOD BY AUTOMATED COUNT: 12.9 % (ref 11.6–15.1)
GFR SERPL CREATININE-BSD FRML MDRD: 93 ML/MIN/1.73SQ M
GLUCOSE SERPL-MCNC: 97 MG/DL (ref 65–140)
HCT VFR BLD AUTO: 48.1 % (ref 34.8–46.1)
HGB BLD-MCNC: 16.3 G/DL (ref 11.5–15.4)
IMM GRANULOCYTES # BLD AUTO: 0.04 THOUSAND/UL (ref 0–0.2)
IMM GRANULOCYTES NFR BLD AUTO: 0 % (ref 0–2)
INR PPP: 0.96 (ref 0.84–1.19)
LYMPHOCYTES # BLD AUTO: 1.81 THOUSANDS/ΜL (ref 0.6–4.47)
LYMPHOCYTES NFR BLD AUTO: 15 % (ref 14–44)
MCH RBC QN AUTO: 30.8 PG (ref 26.8–34.3)
MCHC RBC AUTO-ENTMCNC: 33.9 G/DL (ref 31.4–37.4)
MCV RBC AUTO: 91 FL (ref 82–98)
MONOCYTES # BLD AUTO: 0.78 THOUSAND/ΜL (ref 0.17–1.22)
MONOCYTES NFR BLD AUTO: 7 % (ref 4–12)
NEUTROPHILS # BLD AUTO: 8.95 THOUSANDS/ΜL (ref 1.85–7.62)
NEUTS SEG NFR BLD AUTO: 76 % (ref 43–75)
NRBC BLD AUTO-RTO: 0 /100 WBCS
P AXIS: 71 DEGREES
PLATELET # BLD AUTO: 181 THOUSANDS/UL (ref 149–390)
PMV BLD AUTO: 12.3 FL (ref 8.9–12.7)
POTASSIUM SERPL-SCNC: 3.5 MMOL/L (ref 3.5–5.3)
PR INTERVAL: 150 MS
PROTHROMBIN TIME: 12.8 SECONDS (ref 11.6–14.5)
QRS AXIS: 82 DEGREES
QRSD INTERVAL: 92 MS
QT INTERVAL: 388 MS
QTC INTERVAL: 427 MS
RBC # BLD AUTO: 5.29 MILLION/UL (ref 3.81–5.12)
SODIUM SERPL-SCNC: 139 MMOL/L (ref 136–145)
T WAVE AXIS: 70 DEGREES
TROPONIN I SERPL-MCNC: <0.02 NG/ML
VENTRICULAR RATE: 73 BPM
WBC # BLD AUTO: 11.83 THOUSAND/UL (ref 4.31–10.16)

## 2021-07-09 PROCEDURE — 99153 MOD SED SAME PHYS/QHP EA: CPT

## 2021-07-09 PROCEDURE — 85730 THROMBOPLASTIN TIME PARTIAL: CPT

## 2021-07-09 PROCEDURE — 94760 N-INVAS EAR/PLS OXIMETRY 1: CPT

## 2021-07-09 PROCEDURE — 99285 EMERGENCY DEPT VISIT HI MDM: CPT | Performed by: EMERGENCY MEDICINE

## 2021-07-09 PROCEDURE — C1729 CATH, DRAINAGE: HCPCS

## 2021-07-09 PROCEDURE — 85025 COMPLETE CBC W/AUTO DIFF WBC: CPT

## 2021-07-09 PROCEDURE — 32557 INSERT CATH PLEURA W/ IMAGE: CPT | Performed by: RADIOLOGY

## 2021-07-09 PROCEDURE — 85610 PROTHROMBIN TIME: CPT

## 2021-07-09 PROCEDURE — 80048 BASIC METABOLIC PNL TOTAL CA: CPT

## 2021-07-09 PROCEDURE — 99152 MOD SED SAME PHYS/QHP 5/>YRS: CPT

## 2021-07-09 PROCEDURE — 99223 1ST HOSP IP/OBS HIGH 75: CPT | Performed by: FAMILY MEDICINE

## 2021-07-09 PROCEDURE — 0W9B30Z DRAINAGE OF LEFT PLEURAL CAVITY WITH DRAINAGE DEVICE, PERCUTANEOUS APPROACH: ICD-10-PCS | Performed by: RADIOLOGY

## 2021-07-09 PROCEDURE — C1769 GUIDE WIRE: HCPCS

## 2021-07-09 PROCEDURE — 99152 MOD SED SAME PHYS/QHP 5/>YRS: CPT | Performed by: RADIOLOGY

## 2021-07-09 PROCEDURE — 99285 EMERGENCY DEPT VISIT HI MDM: CPT

## 2021-07-09 PROCEDURE — 71046 X-RAY EXAM CHEST 2 VIEWS: CPT

## 2021-07-09 PROCEDURE — 36415 COLL VENOUS BLD VENIPUNCTURE: CPT

## 2021-07-09 PROCEDURE — 93005 ELECTROCARDIOGRAM TRACING: CPT

## 2021-07-09 PROCEDURE — 32557 INSERT CATH PLEURA W/ IMAGE: CPT

## 2021-07-09 PROCEDURE — 93010 ELECTROCARDIOGRAM REPORT: CPT | Performed by: INTERNAL MEDICINE

## 2021-07-09 PROCEDURE — NC001 PR NO CHARGE: Performed by: PHYSICIAN ASSISTANT

## 2021-07-09 PROCEDURE — 84484 ASSAY OF TROPONIN QUANT: CPT

## 2021-07-09 RX ORDER — PANTOPRAZOLE SODIUM 40 MG/1
40 TABLET, DELAYED RELEASE ORAL DAILY
Status: DISCONTINUED | OUTPATIENT
Start: 2021-07-10 | End: 2021-07-13 | Stop reason: HOSPADM

## 2021-07-09 RX ORDER — ONDANSETRON 2 MG/ML
4 INJECTION INTRAMUSCULAR; INTRAVENOUS EVERY 6 HOURS PRN
Status: DISCONTINUED | OUTPATIENT
Start: 2021-07-09 | End: 2021-07-13 | Stop reason: HOSPADM

## 2021-07-09 RX ORDER — LIDOCAINE WITH 8.4% SOD BICARB 0.9%(10ML)
SYRINGE (ML) INJECTION CODE/TRAUMA/SEDATION MEDICATION
Status: COMPLETED | OUTPATIENT
Start: 2021-07-09 | End: 2021-07-09

## 2021-07-09 RX ORDER — NICOTINE 21 MG/24HR
1 PATCH, TRANSDERMAL 24 HOURS TRANSDERMAL EVERY 24 HOURS
Status: DISCONTINUED | OUTPATIENT
Start: 2021-07-09 | End: 2021-07-13 | Stop reason: HOSPADM

## 2021-07-09 RX ORDER — SODIUM CHLORIDE, SODIUM GLUCONATE, SODIUM ACETATE, POTASSIUM CHLORIDE, MAGNESIUM CHLORIDE, SODIUM PHOSPHATE, DIBASIC, AND POTASSIUM PHOSPHATE .53; .5; .37; .037; .03; .012; .00082 G/100ML; G/100ML; G/100ML; G/100ML; G/100ML; G/100ML; G/100ML
75 INJECTION, SOLUTION INTRAVENOUS CONTINUOUS
Status: CANCELLED | OUTPATIENT
Start: 2021-07-09

## 2021-07-09 RX ORDER — BUPROPION HYDROCHLORIDE 150 MG/1
300 TABLET ORAL EVERY MORNING
Status: DISCONTINUED | OUTPATIENT
Start: 2021-07-10 | End: 2021-07-13 | Stop reason: HOSPADM

## 2021-07-09 RX ORDER — OXYCODONE HYDROCHLORIDE AND ACETAMINOPHEN 5; 325 MG/1; MG/1
2 TABLET ORAL EVERY 4 HOURS PRN
Status: DISCONTINUED | OUTPATIENT
Start: 2021-07-09 | End: 2021-07-13 | Stop reason: HOSPADM

## 2021-07-09 RX ORDER — CYCLOBENZAPRINE HCL 10 MG
5 TABLET ORAL 3 TIMES DAILY PRN
Status: DISCONTINUED | OUTPATIENT
Start: 2021-07-09 | End: 2021-07-13 | Stop reason: HOSPADM

## 2021-07-09 RX ORDER — VERAPAMIL HYDROCHLORIDE 240 MG/1
240 TABLET, FILM COATED, EXTENDED RELEASE ORAL DAILY
Status: DISCONTINUED | OUTPATIENT
Start: 2021-07-10 | End: 2021-07-13 | Stop reason: HOSPADM

## 2021-07-09 RX ORDER — ACETAMINOPHEN 325 MG/1
650 TABLET ORAL EVERY 6 HOURS PRN
Status: DISCONTINUED | OUTPATIENT
Start: 2021-07-09 | End: 2021-07-13 | Stop reason: HOSPADM

## 2021-07-09 RX ORDER — DIPHENHYDRAMINE HYDROCHLORIDE 50 MG/ML
INJECTION INTRAMUSCULAR; INTRAVENOUS CODE/TRAUMA/SEDATION MEDICATION
Status: COMPLETED | OUTPATIENT
Start: 2021-07-09 | End: 2021-07-09

## 2021-07-09 RX ORDER — PRAVASTATIN SODIUM 40 MG
40 TABLET ORAL
Status: DISCONTINUED | OUTPATIENT
Start: 2021-07-10 | End: 2021-07-13 | Stop reason: HOSPADM

## 2021-07-09 RX ORDER — FENTANYL CITRATE 50 UG/ML
INJECTION, SOLUTION INTRAMUSCULAR; INTRAVENOUS CODE/TRAUMA/SEDATION MEDICATION
Status: COMPLETED | OUTPATIENT
Start: 2021-07-09 | End: 2021-07-09

## 2021-07-09 RX ORDER — MIDAZOLAM HYDROCHLORIDE 2 MG/2ML
INJECTION, SOLUTION INTRAMUSCULAR; INTRAVENOUS CODE/TRAUMA/SEDATION MEDICATION
Status: COMPLETED | OUTPATIENT
Start: 2021-07-09 | End: 2021-07-09

## 2021-07-09 RX ORDER — FLUTICASONE FUROATE AND VILANTEROL 200; 25 UG/1; UG/1
1 POWDER RESPIRATORY (INHALATION)
Status: DISCONTINUED | OUTPATIENT
Start: 2021-07-10 | End: 2021-07-10

## 2021-07-09 RX ORDER — ALBUTEROL SULFATE 2.5 MG/3ML
2.5 SOLUTION RESPIRATORY (INHALATION) EVERY 4 HOURS PRN
Status: DISCONTINUED | OUTPATIENT
Start: 2021-07-09 | End: 2021-07-13 | Stop reason: HOSPADM

## 2021-07-09 RX ADMIN — OXYCODONE HYDROCHLORIDE AND ACETAMINOPHEN 2 TABLET: 5; 325 TABLET ORAL at 16:46

## 2021-07-09 RX ADMIN — FENTANYL CITRATE 25 MCG: 50 INJECTION INTRAMUSCULAR; INTRAVENOUS at 15:26

## 2021-07-09 RX ADMIN — FENTANYL CITRATE 50 MCG: 50 INJECTION INTRAMUSCULAR; INTRAVENOUS at 15:35

## 2021-07-09 RX ADMIN — DIPHENHYDRAMINE HYDROCHLORIDE 25 MG: 50 INJECTION, SOLUTION INTRAMUSCULAR; INTRAVENOUS at 15:21

## 2021-07-09 RX ADMIN — OXYCODONE HYDROCHLORIDE AND ACETAMINOPHEN 2 TABLET: 5; 325 TABLET ORAL at 23:15

## 2021-07-09 RX ADMIN — ACETAMINOPHEN 650 MG: 325 TABLET, FILM COATED ORAL at 20:26

## 2021-07-09 RX ADMIN — FENTANYL CITRATE 50 MCG: 50 INJECTION INTRAMUSCULAR; INTRAVENOUS at 15:14

## 2021-07-09 RX ADMIN — DIPHENHYDRAMINE HYDROCHLORIDE 25 MG: 50 INJECTION, SOLUTION INTRAMUSCULAR; INTRAVENOUS at 15:14

## 2021-07-09 RX ADMIN — MIDAZOLAM 0.5 MG: 1 INJECTION INTRAMUSCULAR; INTRAVENOUS at 15:20

## 2021-07-09 RX ADMIN — Medication 20 ML: at 15:35

## 2021-07-09 RX ADMIN — FENTANYL CITRATE 50 MCG: 50 INJECTION INTRAMUSCULAR; INTRAVENOUS at 15:40

## 2021-07-09 RX ADMIN — FENTANYL CITRATE 25 MCG: 50 INJECTION INTRAMUSCULAR; INTRAVENOUS at 15:20

## 2021-07-09 RX ADMIN — MIDAZOLAM 1 MG: 1 INJECTION INTRAMUSCULAR; INTRAVENOUS at 15:35

## 2021-07-09 RX ADMIN — MIDAZOLAM 0.5 MG: 1 INJECTION INTRAMUSCULAR; INTRAVENOUS at 15:14

## 2021-07-09 RX ADMIN — MIDAZOLAM 0.5 MG: 1 INJECTION INTRAMUSCULAR; INTRAVENOUS at 15:26

## 2021-07-09 RX ADMIN — MIDAZOLAM 0.5 MG: 1 INJECTION INTRAMUSCULAR; INTRAVENOUS at 15:40

## 2021-07-09 NOTE — H&P
[facility]  H&P- [facility] 1960, 57 y o  female MRN: [id number]  Unit/Bed#: ED 13 Encounter: [id number]  Primary Care Provider: Teo Canseco MD   Date and time admitted to hospital: 7/9/2021 12:17 PM    * Postprocedural pneumothorax  Assessment & Plan  Patient sent from pulmonologist office after developed shortness of breath on exertion secondary to postprocedural pneumothorax from lung biopsy 2 days ago  IR has been consulted and plan for chest tube placement today  S/p chest tube placement, currently on continuous wall suction    Continuous dependence on cigarette smoking  Assessment & Plan  Tobacco cessation counseling  Nicotine patch    Solitary pulmonary nodule  Assessment & Plan  Status post biopsy 2 days ago, need to follow-up with outpatient pulmonology for resolved    Major depressive disorder, recurrent ([facility] [address] )  Assessment & Plan  Resume Wellbutrin    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  ([facility] [address] )  Assessment & Plan  Does not have acute exacerbation  Resume home inhalers    Coronary atherosclerosis  Assessment & Plan  Resume home medicine Crestor, verapamil, hold aspirin for procedure    VTE Prophylaxis: Pharmacologic VTE Prophylaxis contraindicated due to UPCOMING IR PROCEDURE  / sequential compression device   Code Status: full code    Anticipated Length of Stay:  Patient will be admitted on an inpatient basis with an anticipated length of stay of  > 2 midnights  Justification for Hospital Stay:  Pneumothorax    Total Time for Visit, including Counseling / Coordination of Care: 60 minutes  Greater than 50% of this total time spent on direct patient counseling and coordination of care  Chief Complaint:   Shortness of breath    History of Present Illness:    [facility] 57 [facility] sent from pulmonologist's office (Dr Anthony Jones) for evaluation of pneumothorax    Patient had left lung nodule biopsy on Tuesday, and had small postprocedural pneumothorax, he was sent home under close monitor  Today patient developed shortness of breath with exertion  Chest x-ray was repeated this morning that showed expanding the sided pneumothorax  Patient was sent to ER for evaluation by IR and chest tube placement  Patient is currently saturating at 90% in room air, no acute distress  patient complaining of pain after chest tube placement  No worsening dyspnea, no fever or chills    Review of Systems:    Review of Systems   Constitutional: Negative for activity change, appetite change and fever  HENT: Negative for congestion and sore throat  Eyes: Negative for pain and visual disturbance  Respiratory: Positive for shortness of breath  Negative for cough and wheezing  Cardiovascular: Negative for chest pain, palpitations and leg swelling  Gastrointestinal: Negative for abdominal distention and abdominal pain  Endocrine: Negative for polyuria  Genitourinary: Negative for difficulty urinating and dysuria  Musculoskeletal: Negative for arthralgias and back pain  Skin: Negative for rash and wound  Allergic/Immunologic: Negative for immunocompromised state  Neurological: Negative for dizziness, speech difficulty and headaches  Hematological: Negative for adenopathy  Psychiatric/Behavioral: Negative for sleep disturbance  The patient is not hyperactive          Past Medical and Surgical History:     Past Medical History:   Diagnosis Date    COPD (chronic obstructive pulmonary disease) ([facility] [address] )     GERD (gastroesophageal reflux disease)     Hypercholesterolemia     Iliotibial band tendonitis     Last Assessed: 6/5/2015    Migraine     Prinzmetal angina (HCC)     Last Assessed: 5/23/2017    Sciatica     Last Assessed: 11/10/2014    Tobacco abuse 4/19/2018    Vertigo     Last Assessed: 4/12/2017       Past Surgical History:   Procedure Laterality Date    COLONOSCOPY  06/2011    Complete: Dr Cary Pierre - due in 5 years, Colonoscopy Nov 2017, Diverticulosis, 5 mm polyp, Due in 5 years    ESOPHAGOGASTRODUODENOSCOPY      Diagnostic; Last Assessed: 7/11/2014    FLEXOR TENDON REPAIR      left finger    HYSTERECTOMY      @ [address] 28    SC LARYNGOSCOPY,DIRECT,DIAGNOSTIC N/A 5/16/2019    Procedure: LARYNGOSCOPY DIRECT, FLEXIBLE BRONCHOSCOPY, FLEXIBLE ESOPHAGOSCOPY;  Surgeon: Lakshmi Lew MD;  Location: [facility];  Service: ENT    SHOULDER ARTHROSCOPY Right     SHOULDER SURGERY      TOTAL ABDOMINAL HYSTERECTOMY W/ BILATERAL SALPINGOOPHORECTOMY      endometriosis; Last Assessed: 5/4/2015       Meds/Allergies:    Prior to Admission medications    Medication Sig Start Date End Date Taking?  Authorizing Provider   albuterol (2 5 mg/3 mL) 0 083 % nebulizer solution Take 1 vial (2 5 mg total) by nebulization every 4 (four) hours as needed for wheezing or shortness of breath 2/8/21   Stephie Domínguez MD   Albuterol Sulfate (ProAir RespiClick) 750 (90 Base) MCG/ACT AEPB Two puffs every 4-6 hours as needed for cough/wheeze/shortness of breath 4/26/21   Stephie Domínguez MD   aspirin (ECOTRIN LOW STRENGTH) 81 mg EC tablet Take 1 tablet by mouth daily    Historical Provider, MD   buPROPion (WELLBUTRIN XL) 300 mg 24 hr tablet Take 1 tablet (300 mg total) by mouth every morning 4/26/21   Stephie Domínguez MD   cyclobenzaprine (FLEXERIL) 10 mg tablet Take 1 tablet at bedtime as needed for muscle spasm 3/4/19   Stephie Domínguez MD   famotidine (PEPCID) 40 MG tablet Take 1 tablet by mouth 11/2/17   Historical Provider, MD   ibuprofen (MOTRIN) 600 mg tablet Take 1 tablet (600 mg total) by mouth every 6 (six) hours as needed for moderate pain 7/6/21   Peobenigno Welch, DO   nicotine (NICODERM CQ) 14 mg/24hr TD 24 hr patch Place 1 patch on the skin every 24 hours 4/30/21   Ashlee Ha DO   nicotine (NICODERM CQ) 21 mg/24 hr TD 24 hr patch Place 1 patch on the skin every 24 hours 4/30/21   Ashlee Ha, DO nitroglycerin (Nitrostat) 0 4 mg SL tablet Place 1 tablet (0 4 mg total) under the tongue every 5 (five) minutes as needed for chest pain 10/26/20   Yoan Maciel MD   nystatin (MYCOSTATIN) 500,000 units/5 mL suspension Apply 5 mL (500,000 Units total) to the mouth or throat 4 (four) times a day for 5 days 7/6/21 7/11/21  Oriana Barron DO   oxyCODONE-acetaminophen (PERCOCET) 5-325 mg per tablet Take 2 tablets by mouth every 4 (four) hours as needed for severe pain for up to 10 daysMax Daily Amount: 12 tablets 7/6/21 7/16/21  Amber Munoz DO   pantoprazole (PROTONIX) 40 mg tablet TAKE 1 TABLET BY MOUTH EVERY DAY 12/21/20   Yoan Maciel MD   rosuvastatin (CRESTOR) 20 MG tablet TAKE 1 TABLET DAILY 10/3/20   Yoan Maciel MD   tiotropium-olodaterol (Stiolto Respimat) 2 5-2 5 MCG/ACT inhaler Inhale 2 puffs daily 6/30/21   Ryan Ferreira DO   verapamil (CALAN-SR) 240 mg CR tablet TAKE 1 TABLET DAILY 11/7/20   Yoan Maciel MD     I have reviewed home medications using allscripts  Allergies:    Allergies   Allergen Reactions    Darvon [Propoxyphene] Itching       Social History:     Marital Status: /Civil Union     Substance Use History:   Social History     Substance and Sexual Activity   Alcohol Use No     Social History     Tobacco Use   Smoking Status Current Every Day Smoker    Packs/day: 0 50    Years: 48 00    Pack years: 24 00    Types: Cigarettes    Start date: 1970   Smokeless Tobacco Never Used     Social History     Substance and Sexual Activity   Drug Use No       Family History:    Family History   Problem Relation Age of Onset    Lung cancer Mother 47    Other Father         Dyslipidemia    Diabetes Sister     Hypertension Family     Ovarian cancer Maternal Grandmother 48    No Known Problems Daughter     No Known Problems Maternal Grandfather     Lung cancer Paternal Grandmother     No Known Problems Paternal Grandfather     No Known Problems Sister     No Known Problems Sister     No Known Problems Daughter     No Known Problems Maternal Aunt     No Known Problems Paternal Aunt     No Known Problems Paternal Aunt     No Known Problems Paternal Aunt        Physical Exam:     Vitals:   Blood Pressure: 141/77 (07/09/21 1400)  Pulse: 74 (07/09/21 1400)  Temperature: 98 4 °F (36 9 °C) (07/09/21 1223)  Temp Source: Oral (07/09/21 1223)  Respirations: 20 (07/09/21 1400)  Height: 5' 2" (157 5 cm) (07/09/21 1223)  Weight - Scale: 68 5 kg (151 lb) (07/09/21 1223)  SpO2: 91 % (07/09/21 1400)    Physical Exam  Constitutional:       Appearance: Normal appearance  She is well-developed and normal weight  HENT:      Head: Normocephalic and atraumatic  Right Ear: External ear normal       Left Ear: External ear normal    Cardiovascular:      Rate and Rhythm: Normal rate and regular rhythm  Pulses: Normal pulses  Heart sounds: Normal heart sounds  Pulmonary:      Effort: Pulmonary effort is normal  No respiratory distress  Breath sounds: Normal breath sounds  Abdominal:      General: Abdomen is flat  There is no distension  Palpations: Abdomen is soft  Tenderness: There is no abdominal tenderness  Musculoskeletal:         General: No swelling or deformity  Cervical back: Normal range of motion  Right lower leg: No edema  Left lower leg: No edema  Skin:     General: Skin is warm and dry  Neurological:      General: No focal deficit present  Mental Status: She is alert and oriented to person, place, and time  Psychiatric:         Mood and Affect: Mood normal               Additional Data:     Lab Results: I have personally reviewed pertinent reports        Results from last 7 days   Lab Units 07/09/21  1319   WBC Thousand/uL 11 83*   HEMOGLOBIN g/dL 16 3*   HEMATOCRIT % 48 1*   PLATELETS Thousands/uL 181   NEUTROS PCT % 76*   LYMPHS PCT % 15   MONOS PCT % 7   EOS PCT % 2     Results from last 7 days   Lab Units 07/09/21  1319   SODIUM mmol/L 139   POTASSIUM mmol/L 3 5   CHLORIDE mmol/L 108   CO2 mmol/L 25   BUN mg/dL 10   CREATININE mg/dL 0 70   ANION GAP mmol/L 6   CALCIUM mg/dL 9 3   GLUCOSE RANDOM mg/dL 97     Results from last 7 days   Lab Units 07/09/21  1319   INR  0 96                   Imaging: I have personally reviewed pertinent reports  IR chest tube placement    (Results Pending)       EKG, Pathology, and Other Studies Reviewed on Admission:   · EKG: pending     Allscripts / Epic Records Reviewed: Yes     ** Please Note: This note has been constructed using a voice recognition system   **

## 2021-07-09 NOTE — TELEMEDICINE
e-Consult (IPC)  - Interventional Radiology  Fitz Zazueta 57 y o  female MRN: [id number]  Unit/Bed#: ED 15 Encounter: [id number]    IR has been consulted to evaluate the patient, determine the appropriate procedure, and whether or not a procedure can and should be performed regarding the care of Fitz Zazueta  We were consulted by Emergency Department concerning large left pneumothorax status post biopsy, and to possibly perform a left chest tube if medically appropriate for the patient  IP Consult to IR  Consult performed by: Dharmesh Joaquin PA-C  Consult ordered by: Demond Meeks DO        07/09/21      Assessment/Recommendation:     78-year-old female with past medical history of bronchoscopy with EBUS with left lung biopsy 7/6/21, presenting with large left pneumothorax    -will plan for left chest tube placement today  -please keep NPO  -discussed with Dr Vesta Campbell      Total time spent in review of data, discussion with requesting provider and rendering advice was 25 minutes       Patient or appropriate family member was verbally informed by emergency department of this consultative service on their behalf to provide more timely access to specialty care in lieu of an in person consultation  Verbal consent was obtained  Thank you for allowing Interventional Radiology to participate in the care of Fitz Zazueta  Please don't hesitate to call or TigerText us with any questions       Dharmesh Joaquin PA-C

## 2021-07-09 NOTE — PROGRESS NOTES
I spoke with the patient  She continues with increased dyspnea and cough  No longer any blood in the phlegm    She has had some problems with climbing stairs  No chest pain  Fortunately she is not smoking  We discussed the results of her biopsy that did not show malignancy  We discussed that it may be that they were unable to sample the part that had malignancy, or that it is a very slow-growing on a tumor  So while we are happy we do not see cancer cells on this evaluation, that does not mean that this isn't malignant  She understands that we will need close follow-up imaging  I have asked her to go get a chest x-ray today to ensure that the pneumothorax is healed  I will see her at our previously scheduled appointment in September

## 2021-07-09 NOTE — DISCHARGE INSTRUCTIONS
Moderate Sedation   WHAT YOU NEED TO KNOW:   Moderate sedation, or conscious sedation, is medicine used during procedures to help you feel relaxed and calm  You will be awake and able to follow directions without anxiety or pain  You will remember little to none of the procedure  You may feel tired, weak, or unsteady on your feet after you get sedation  You may also have trouble concentrating or short-term memory loss  These symptoms should go away in 24 hours or less  DISCHARGE INSTRUCTIONS:   Call 911 or have someone else call for any of the following:   · You have sudden trouble breathing  · You cannot be woken  Seek care immediately if:   · You have a severe headache or dizziness  · Your heart is beating faster than usual   Contact your healthcare provider if:   · You have a fever  · You have nausea or are vomiting for more than 8 hours after the procedure  · Your skin is itchy, swollen, or you have a rash  · You have questions or concerns about your condition or care  Self-care:   · Have someone stay with you for 24 hours  This person can drive you to errands and help you do things around the house  This person can also watch for problems  · Rest and do quiet activities for 24 hours  Do not exercise, ride a bike, or play sports  Stand up slowly to prevent dizziness and falls  Take short walks around the house with another person  Slowly return to your usual activities the next day  · Do not drive or use dangerous machines or tools for 24 hours  You may injure yourself or others  Examples include a lawnmower, saw, or drill  Do not return to work for 24 hours if you use dangerous machines or tools for work  · Do not make important decisions for 24 hours  For example, do not sign important papers or invest money  · Drink liquids as directed  Liquids help flush the sedation medicine out of your body   Ask how much liquid to drink each day and which liquids are best for you  · Eat small, frequent meals to prevent nausea and vomiting  Start with clear liquids such as juice or broth  If you do not vomit after clear liquids, you can eat your usual foods  · Do not drink alcohol or take medicines that make you drowsy  This includes medicines that help you sleep and anxiety medicines  Ask your healthcare provider if it is safe for you to take pain medicine  Follow up with your healthcare provider as directed: Write down your questions so you remember to ask them during your visits  © 2017 [employer] Information is for End User's use only and may not be sold, redistributed or otherwise used for commercial purposes  All illustrations and images included in CareNotes® are the copyrighted property of A [employer] M [employer]  or Ethan Foley  The above information is an  only  It is not intended as medical advice for individual conditions or treatments  Talk to your doctor, nurse or pharmacist before following any medical regimen to see if it is safe and effective for you  Chest Tubes   WHAT YOU NEED TO KNOW:   A chest tube is also known as chest drain or chest drainage tube  It is a plastic tube that is put through the side of your chest  It uses a suction device to remove air, blood, or fluid from around your lungs or heart  A chest tube will help you breathe more easily  WHILE YOU ARE HERE:   Informed consent  is a legal document that explains the tests, treatments, or procedures that you may need  Informed consent means you understand what will be done and can make decisions about what you want  You give your permission when you sign the consent form  You can have someone sign this form for you if you are not able to sign it  You have the right to understand your medical care in words you know  Before you sign the consent form, understand the risks and benefits of what will be done  Make sure all your questions are answered  Rest:  Keep the head of your bed raised to help you breathe easier  You can also raise your head and shoulders up on pillows or rest in a reclining chair  If you feel short of breath, let caregivers know right away  A pulse oximeter  is a device that measures the amount of oxygen in your blood  A cord with a clip or sticky strip is placed on your finger, ear, or toe  The other end of the cord is hooked to a machine  Medicines:   · Antibiotics:  You may be given antibiotic medicine to prevent an infection  · Pain medicine:    Caregivers may give you medicine to take away or decrease your pain  ¨ Do not wait until the pain is severe to ask for your medicine  Tell caregivers if your pain does not decrease  The medicine may not work as well at controlling your pain if you wait too long to take it  ¨ Pain medicine can make you dizzy or sleepy  Prevent falls by calling a caregiver when you want to get out of bed or if you need help  Prevent problems with the chest tube:   · Find a comfortable position:  You may have pain or discomfort while the chest tube is in  Lie in a different position to help decrease your pain  · Deep breathe and cough:  Deep breathing helps open the air passages in your lungs  Coughing helps to bring up mucus from your lungs  You can deep breathe and cough on your own or with the help of an incentive spirometer  ¨ Take a deep breath and hold the breath as long as you can  Then push the air out of your lungs with a deep, strong cough  Cough into a tissue and throw it away  Take 10 deep breaths in a row every hour that you are awake  Remember to follow each deep breath with a cough  ¨ An incentive spirometer can help you take deeper breaths  Put the plastic piece into your mouth and take a steady, deep breath in  Hold your breath as long as you can, and then breathe out  Use your incentive spirometer 10 times every hour that you are awake      · Check your chest tube for kinks or loops:  Keep the tube close to you when you are in bed, but do not lie on it  Do not let loops of tubing hang down the side of your bed  Be sure your tubing is long enough so that you can move and turn in bed without pulling on it  Never clamp the tube yourself  · Keep the chest tube drainage container below the level of your chest:  This will help fluids drain out from your chest to the container below  This will also help prevent fluids from flowing back into your chest      · Make sure your chest tube is secure: Make sure your chest tube is securely taped to your body  Your chest tube may also be taped to the suction device to help prevent the tubes from coming apart  · Do not turn knobs or change settings on your device unless a healthcare provider tells you to: If your chest tube device has water in it, the water should bubble gently, with short periods of no bubbling  If there is a lot of bubbling that does not stop, this may mean that there is an air leak  RISKS:   · You may get an infection in the area where the tube was inserted  The tube may damage body organs that are close to your lungs  Your chest tube may move out of place when you move or turn  If this happens, you may need to have another chest tube put in      · You may get a blood clot in your leg or arm  This can cause pain and swelling, and it can stop blood from flowing where it needs to go in your body  The blood clot can break loose and travel to your lungs  A blood clot in your lungs can cause chest pain and trouble breathing  This can be life-threatening  CARE AGREEMENT:   You have the right to help plan your care  Learn about your health condition and how it may be treated  Discuss treatment options with your caregivers to decide what care you want to receive  You always have the right to refuse treatment     © 2017 [employer] [employer]  Information is for End User's use only and may not be sold, redistributed or otherwise used for commercial purposes  All illustrations and images included in CareNotes® are the copyrighted property of A D A M , Inc  or Ethan Foley  The above information is an  only  It is not intended as medical advice for individual conditions or treatments  Talk to your doctor, nurse or pharmacist before following any medical regimen to see if it is safe and effective for you

## 2021-07-09 NOTE — ED PROVIDER NOTES
History  Chief Complaint   Patient presents with    Shortness of Breath     Patient had biopsy on Tuesday and had small pneumothorax, increasing SOB, had repeat CXR and pneumthorax had increased in size- sent here by Dr Rousseau  Patient is a 50-year-old female with history of COPD, presenting today for evaluation of SOB  Patient states that she had a biospy of a L lung nodule on Tuesday, 7/7/21, performed by Dr Ren Estrella, pulmonologist  Following the procedure, patient had a small pneumothorax that was being monitored by Dr Ren Estrella  For the last 2 days, patient has been sore at the biopsy site, but today, experienced profound SOB on exertion while climbing the stairs  She presented to Dr Ren Estrella, who performed CXR, which showed larger pneumothorax on the L side  Patient was referred to the ED for further management with placement of chest tube by IR  Prior to Admission Medications   Prescriptions Last Dose Informant Patient Reported? Taking?    Albuterol Sulfate (ProAir RespiClick) 242 (90 Base) MCG/ACT AEPB   No No   Sig: Two puffs every 4-6 hours as needed for cough/wheeze/shortness of breath   albuterol (2 5 mg/3 mL) 0 083 % nebulizer solution   No No   Sig: Take 1 vial (2 5 mg total) by nebulization every 4 (four) hours as needed for wheezing or shortness of breath   aspirin (ECOTRIN LOW STRENGTH) 81 mg EC tablet  Self Yes No   Sig: Take 1 tablet by mouth daily   buPROPion (WELLBUTRIN XL) 300 mg 24 hr tablet   No No   Sig: Take 1 tablet (300 mg total) by mouth every morning   cyclobenzaprine (FLEXERIL) 10 mg tablet  Self No No   Sig: Take 1 tablet at bedtime as needed for muscle spasm   famotidine (PEPCID) 40 MG tablet  Self Yes No   Sig: Take 1 tablet by mouth   ibuprofen (MOTRIN) 600 mg tablet   No No   Sig: Take 1 tablet (600 mg total) by mouth every 6 (six) hours as needed for moderate pain   nicotine (NICODERM CQ) 14 mg/24hr TD 24 hr patch   No No   Sig: Place 1 patch on the skin every 24 hours nicotine (NICODERM CQ) 21 mg/24 hr TD 24 hr patch   No No   Sig: Place 1 patch on the skin every 24 hours   nitroglycerin (Nitrostat) 0 4 mg SL tablet   No No   Sig: Place 1 tablet (0 4 mg total) under the tongue every 5 (five) minutes as needed for chest pain   nystatin (MYCOSTATIN) 500,000 units/5 mL suspension   No No   Sig: Apply 5 mL (500,000 Units total) to the mouth or throat 4 (four) times a day for 5 days   oxyCODONE-acetaminophen (PERCOCET) 5-325 mg per tablet   No No   Sig: Take 2 tablets by mouth every 4 (four) hours as needed for severe pain for up to 10 daysMax Daily Amount: 12 tablets   pantoprazole (PROTONIX) 40 mg tablet   No No   Sig: TAKE 1 TABLET BY MOUTH EVERY DAY   rosuvastatin (CRESTOR) 20 MG tablet   No No   Sig: TAKE 1 TABLET DAILY   tiotropium-olodaterol (Stiolto Respimat) 2 5-2 5 MCG/ACT inhaler   No No   Sig: Inhale 2 puffs daily   verapamil (CALAN-SR) 240 mg CR tablet   No No   Sig: TAKE 1 TABLET DAILY      Facility-Administered Medications: None       Past Medical History:   Diagnosis Date    COPD (chronic obstructive pulmonary disease) (HCC)     GERD (gastroesophageal reflux disease)     Hypercholesterolemia     Iliotibial band tendonitis     Last Assessed: 6/5/2015    Migraine     Prinzmetal angina (HCC)     Last Assessed: 5/23/2017    Sciatica     Last Assessed: 11/10/2014    Tobacco abuse 4/19/2018    Vertigo     Last Assessed: 4/12/2017       Past Surgical History:   Procedure Laterality Date    COLONOSCOPY  06/2011    Complete: Dr Manuel Alexander - due in 5 years, Colonoscopy Nov 2017, Diverticulosis, 5 mm polyp, Due in 5 years    ESOPHAGOGASTRODUODENOSCOPY      Diagnostic;  Last Assessed: 7/11/2014    FLEXOR TENDON REPAIR      left finger    HYSTERECTOMY      @ agr 28    MD LARYNGOSCOPY,DIRECT,DIAGNOSTIC N/A 5/16/2019    Procedure: LARYNGOSCOPY DIRECT, FLEXIBLE BRONCHOSCOPY, FLEXIBLE ESOPHAGOSCOPY;  Surgeon: Rula Tamayo MD;  [facility]: [facility];  Service: ENT    SHOULDER ARTHROSCOPY Right     SHOULDER SURGERY      TOTAL ABDOMINAL HYSTERECTOMY W/ BILATERAL SALPINGOOPHORECTOMY      endometriosis; Last Assessed: 5/4/2015       Family History   Problem Relation Age of Onset    Lung cancer Mother 47    Other Father         Dyslipidemia    Diabetes Sister     Hypertension Family     Ovarian cancer Maternal Grandmother 48    No Known Problems Daughter     No Known Problems Maternal Grandfather     Lung cancer Paternal Grandmother     No Known Problems Paternal Grandfather     No Known Problems Sister     No Known Problems Sister     No Known Problems Daughter     No Known Problems Maternal Aunt     No Known Problems Paternal Aunt     No Known Problems Paternal Aunt     No Known Problems Paternal Aunt      I have reviewed and agree with the history as documented  E-Cigarette/Vaping    E-Cigarette Use Current Every Day User      E-Cigarette/Vaping Substances    Nicotine Yes     THC No     CBD No     Flavoring No     Other No      Social History     Tobacco Use    Smoking status: Current Every Day Smoker     Packs/day: 0 50     Years: 48 00     Pack years: 24 00     Types: Cigarettes     Start date: 1970    Smokeless tobacco: Never Used   Vaping Use    Vaping Use: Every day    Substances: Nicotine   Substance Use Topics    Alcohol use: No    Drug use: No        Review of Systems   Constitutional: Negative for chills and fever  HENT: Negative for ear pain and sore throat  Respiratory: Positive for cough (chronic cough - COPD) and shortness of breath (SOB with exertion, especially when waling up the stairs this morning)  Cardiovascular: Negative for chest pain and palpitations  Gastrointestinal: Negative for abdominal pain and vomiting  Genitourinary: Negative for dysuria and hematuria  Musculoskeletal: Negative for arthralgias and back pain  Skin: Negative for color change and rash  Neurological: Negative for dizziness and weakness  All other systems reviewed and are negative  Physical Exam  ED Triage Vitals [07/09/21 1223]   Temperature Pulse Respirations Blood Pressure SpO2   98 4 °F (36 9 °C) 89 22 144/68 92 %      Temp Source Heart Rate Source Patient Position - Orthostatic VS BP Location FiO2 (%)   Oral Monitor Sitting Right arm --      Pain Score       5             Orthostatic Vital Signs  Vitals:    07/09/21 1223 07/09/21 1321 07/09/21 1400   BP: 144/68 136/80 141/77   Pulse: 89 79 74   Patient Position - Orthostatic VS: Sitting         Physical Exam  Constitutional:       General: She is not in acute distress  Appearance: She is normal weight  HENT:      Head: Normocephalic and atraumatic  Cardiovascular:      Rate and Rhythm: Normal rate and regular rhythm  Pulses: Normal pulses  Heart sounds: Normal heart sounds  Pulmonary:      Effort: Tachypnea and respiratory distress (appears slightly dyspneic) present  No accessory muscle usage  Breath sounds: Examination of the left-upper field reveals decreased breath sounds  Examination of the left-middle field reveals decreased breath sounds  Decreased breath sounds and rhonchi (diffuse rhonchi throughout R lung field) present  No wheezing  Chest:      Chest wall: No tenderness or crepitus  Musculoskeletal:      Right lower leg: No edema  Left lower leg: No edema  Skin:     General: Skin is warm and dry  Neurological:      General: No focal deficit present  Mental Status: She is alert and oriented to person, place, and time     Psychiatric:         Mood and Affect: Mood normal          ED Medications  Medications - No data to display    Diagnostic Studies  Results Reviewed     Procedure Component Value Units Date/Time    Troponin I [[id number]]  (Normal) Collected: 07/09/21 1319    Lab Status: Final result Specimen: Blood from Arm, Right Updated: 07/09/21 1358     Troponin I <0 02 ng/mL     Basic metabolic panel [[id number]] Collected: 07/09/21 1319    Lab Status: Final result Specimen: Blood from Arm, Right Updated: 07/09/21 1354     Sodium 139 mmol/L      Potassium 3 5 mmol/L      Chloride 108 mmol/L      CO2 25 mmol/L      ANION GAP 6 mmol/L      BUN 10 mg/dL      Creatinine 0 70 mg/dL      Glucose 97 mg/dL      Calcium 9 3 mg/dL      eGFR 93 ml/min/1 73sq m     Narrative:      [place] guidelines for Chronic Kidney Disease (CKD):     Stage 1 with normal or high GFR (GFR > 90 mL/min/1 73 square meters)    Stage 2 Mild CKD (GFR = 60-89 mL/min/1 73 square meters)    Stage 3A Moderate CKD (GFR = 45-59 mL/min/1 73 square meters)    Stage 3B Moderate CKD (GFR = 30-44 mL/min/1 73 square meters)    Stage 4 Severe CKD (GFR = 15-29 mL/min/1 73 square meters)    Stage 5 End Stage CKD (GFR <15 mL/min/1 73 square meters)  Note: GFR calculation is accurate only with a steady state creatinine    Protime-INR [[id number]]  (Normal) Collected: 07/09/21 1319    Lab Status: Final result Specimen: Blood from Arm, Right Updated: 07/09/21 1351     Protime 12 8 seconds      INR 0 96    APTT [[id number]]  (Normal) Collected: 07/09/21 1319    Lab Status: Final result Specimen: Blood from Arm, Right Updated: 07/09/21 1351     PTT 25 seconds     CBC and differential [[id number]]  (Abnormal) Collected: 07/09/21 1319    Lab Status: Final result Specimen: Blood from Arm, Right Updated: 07/09/21 1331     WBC 11 83 Thousand/uL      RBC 5 29 Million/uL      Hemoglobin 16 3 g/dL      Hematocrit 48 1 %      MCV 91 fL      MCH 30 8 pg      MCHC 33 9 g/dL      RDW 12 9 %      MPV 12 3 fL      Platelets 028 Thousands/uL      nRBC 0 /100 WBCs      Neutrophils Relative 76 %      Immat GRANS % 0 %      Lymphocytes Relative 15 %      Monocytes Relative 7 %      Eosinophils Relative 2 %      Basophils Relative 0 %      Neutrophils Absolute 8 95 Thousands/µL      Immature Grans Absolute 0 04 Thousand/uL      Lymphocytes Absolute 1 81 Thousands/µL      Monocytes Absolute 0 78 Thousand/µL      Eosinophils Absolute 0 21 Thousand/µL      Basophils Absolute 0 04 Thousands/µL                  IR chest tube placement    (Results Pending)         Procedures  ECG 12 Lead Documentation Only    Date/Time: 7/9/2021 1:41 PM  Performed by: Melanie French DO  Authorized by: Melanie French DO     Indications / Diagnosis:  Known pneumothorax  ECG reviewed by me, the ED Provider: yes    Patient location:  ED  Previous ECG:     Previous ECG:  Compared to current    Comparison ECG info:  SR at 75 bpm, low voltage    Similarity:  No change    Comparison to cardiac monitor: Yes    Interpretation:     Interpretation: normal    Rate:     ECG rate:  73 bpm    ECG rate assessment: normal    Rhythm:     Rhythm: sinus rhythm    Ectopy:     Ectopy: none    QRS:     QRS axis:  Normal  Conduction:     Conduction: normal    ST segments:     ST segments:  Non-specific    Depression:  II and III  T waves:     T waves: normal        Conscious Sedation Assessment      Classification Score   ASA Scale Assessment  2-Mild to moderate systemic disease, medically well controlled, with no functional limitation filed at 07/09/2021 1430   Mallampati Classification  Class II: soft palate, uvula, fauces visible - No Difficulty filed at 07/09/2021 1430          ED Course   Call placed to IR on call and case discussed; IR is aware of patient and will evaluated for further management  I re-evaluated patient at bedside  Patient is not tachycardic, not dyspneic at rest  HR is 84 bpm, O2 sat 94% on 2L NC  IR requests NPO status  They will perform procedure later today  Patient is agreeable with this plan  Case discussed with Dr Reyes Allegra; arrangements made for admission                                      MDM    Disposition  Final diagnoses:   Pneumothorax after biopsy   Dyspnea on exertion     Time reflects when diagnosis was documented in both MDM as applicable and the Disposition within this note     Time User Action Codes Description Comment    7/9/2021  2:44 PM Jono Reddish Add [M29 951] Pneumothorax after biopsy     7/9/2021  2:46 PM Jono Reddish Add [R06 00] Dyspnea on exertion       ED Disposition     ED Disposition Condition Date/Time Comment    Admit Stable Fri Jul 9, 2021  2:44 PM Case was discussed with Dr Isidro Posada and the patient's admission status was agreed to be Admission Status: inpatient status to the service of Dr Isidro Posada  IR consult ordered  Follow-up Information    None         Patient's Medications   Discharge Prescriptions    No medications on file     No discharge procedures on file  PDMP Review     None           ED Provider  Attending physically available and evaluated Evelynarnoldo Ruiz  CELESTINE managed the patient along with the ED Attending      Electronically Signed by         Syed Riggins,   07/09/21 [facility],   07/09/21 4048

## 2021-07-09 NOTE — ASSESSMENT & PLAN NOTE
Patient sent from pulmonologist office after developed shortness of breath on exertion secondary to postprocedural pneumothorax from lung biopsy 2 days ago    IR has been consulted and plan for chest tube placement today  S/p chest tube placement, currently on continuous wall suction

## 2021-07-09 NOTE — SEDATION DOCUMENTATION
Left sided chest tube placed by Dr Francesca Vu, without complication  Patient tolerated well and vss  Patient transported back to ED and report given to primary RN   IR Procedure Bedrest Start Time is 1610

## 2021-07-10 ENCOUNTER — APPOINTMENT (INPATIENT)
Dept: RADIOLOGY | Facility: HOSPITAL | Age: 61
DRG: 200 | End: 2021-07-10
Payer: COMMERCIAL

## 2021-07-10 LAB
ANION GAP SERPL CALCULATED.3IONS-SCNC: 1 MMOL/L (ref 4–13)
BUN SERPL-MCNC: 14 MG/DL (ref 5–25)
CALCIUM SERPL-MCNC: 8.8 MG/DL (ref 8.3–10.1)
CHLORIDE SERPL-SCNC: 108 MMOL/L (ref 100–108)
CO2 SERPL-SCNC: 28 MMOL/L (ref 21–32)
CREAT SERPL-MCNC: 0.7 MG/DL (ref 0.6–1.3)
ERYTHROCYTE [DISTWIDTH] IN BLOOD BY AUTOMATED COUNT: 12.9 % (ref 11.6–15.1)
GFR SERPL CREATININE-BSD FRML MDRD: 93 ML/MIN/1.73SQ M
GLUCOSE SERPL-MCNC: 82 MG/DL (ref 65–140)
HCT VFR BLD AUTO: 43.9 % (ref 34.8–46.1)
HGB BLD-MCNC: 14.3 G/DL (ref 11.5–15.4)
MCH RBC QN AUTO: 30 PG (ref 26.8–34.3)
MCHC RBC AUTO-ENTMCNC: 32.6 G/DL (ref 31.4–37.4)
MCV RBC AUTO: 92 FL (ref 82–98)
PLATELET # BLD AUTO: 161 THOUSANDS/UL (ref 149–390)
PMV BLD AUTO: 12.6 FL (ref 8.9–12.7)
POTASSIUM SERPL-SCNC: 3.9 MMOL/L (ref 3.5–5.3)
RBC # BLD AUTO: 4.76 MILLION/UL (ref 3.81–5.12)
SODIUM SERPL-SCNC: 137 MMOL/L (ref 136–145)
WBC # BLD AUTO: 7.95 THOUSAND/UL (ref 4.31–10.16)

## 2021-07-10 PROCEDURE — 94640 AIRWAY INHALATION TREATMENT: CPT

## 2021-07-10 PROCEDURE — 99232 SBSQ HOSP IP/OBS MODERATE 35: CPT | Performed by: INTERNAL MEDICINE

## 2021-07-10 PROCEDURE — 71045 X-RAY EXAM CHEST 1 VIEW: CPT

## 2021-07-10 PROCEDURE — 99222 1ST HOSP IP/OBS MODERATE 55: CPT | Performed by: INTERNAL MEDICINE

## 2021-07-10 PROCEDURE — 94760 N-INVAS EAR/PLS OXIMETRY 1: CPT

## 2021-07-10 PROCEDURE — 85027 COMPLETE CBC AUTOMATED: CPT | Performed by: FAMILY MEDICINE

## 2021-07-10 PROCEDURE — 80048 BASIC METABOLIC PNL TOTAL CA: CPT | Performed by: FAMILY MEDICINE

## 2021-07-10 RX ORDER — IPRATROPIUM BROMIDE AND ALBUTEROL SULFATE 2.5; .5 MG/3ML; MG/3ML
SOLUTION RESPIRATORY (INHALATION)
Status: COMPLETED
Start: 2021-07-10 | End: 2021-07-10

## 2021-07-10 RX ORDER — IPRATROPIUM BROMIDE AND ALBUTEROL SULFATE 2.5; .5 MG/3ML; MG/3ML
3 SOLUTION RESPIRATORY (INHALATION) 2 TIMES DAILY
Status: DISCONTINUED | OUTPATIENT
Start: 2021-07-10 | End: 2021-07-13 | Stop reason: HOSPADM

## 2021-07-10 RX ORDER — IPRATROPIUM BROMIDE AND ALBUTEROL SULFATE 2.5; .5 MG/3ML; MG/3ML
3 SOLUTION RESPIRATORY (INHALATION) 2 TIMES DAILY
Status: DISCONTINUED | OUTPATIENT
Start: 2021-07-10 | End: 2021-07-10

## 2021-07-10 RX ADMIN — BUPROPION HYDROCHLORIDE 300 MG: 150 TABLET, FILM COATED, EXTENDED RELEASE ORAL at 08:42

## 2021-07-10 RX ADMIN — PANTOPRAZOLE SODIUM 40 MG: 40 TABLET, DELAYED RELEASE ORAL at 08:42

## 2021-07-10 RX ADMIN — VERAPAMIL HYDROCHLORIDE 240 MG: 240 TABLET ORAL at 08:42

## 2021-07-10 RX ADMIN — Medication 1 PATCH: at 15:36

## 2021-07-10 RX ADMIN — IPRATROPIUM BROMIDE AND ALBUTEROL SULFATE 3 ML: 2.5; .5 SOLUTION RESPIRATORY (INHALATION) at 17:55

## 2021-07-10 RX ADMIN — OXYCODONE HYDROCHLORIDE AND ACETAMINOPHEN 2 TABLET: 5; 325 TABLET ORAL at 08:47

## 2021-07-10 RX ADMIN — OXYCODONE HYDROCHLORIDE AND ACETAMINOPHEN 2 TABLET: 5; 325 TABLET ORAL at 13:17

## 2021-07-10 RX ADMIN — IPRATROPIUM BROMIDE AND ALBUTEROL SULFATE 3 ML: 2.5; .5 SOLUTION RESPIRATORY (INHALATION) at 08:35

## 2021-07-10 RX ADMIN — PRAVASTATIN SODIUM 40 MG: 40 TABLET ORAL at 15:36

## 2021-07-10 RX ADMIN — OXYCODONE HYDROCHLORIDE AND ACETAMINOPHEN 2 TABLET: 5; 325 TABLET ORAL at 23:13

## 2021-07-10 RX ADMIN — OXYCODONE HYDROCHLORIDE AND ACETAMINOPHEN 2 TABLET: 5; 325 TABLET ORAL at 18:16

## 2021-07-10 NOTE — RESPIRATORY THERAPY NOTE
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)    Major depressive disorder, recurrent (HCC)    Solitary pulmonary nodule    Continuous dependence on cigarette smoking      Home Pulmonary Medications:  25mg albuterol prn       Past Medical History:   Diagnosis Date    COPD (chronic obstructive pulmonary disease) ([facility])     GERD (gastroesophageal reflux disease)     Hypercholesterolemia     Iliotibial band tendonitis     Last Assessed: 6/5/2015    Migraine     Prinzmetal angina ([facility])     Last Assessed: 5/23/2017    Sciatica     Last Assessed: 11/10/2014    Tobacco abuse 4/19/2018    Vertigo     Last Assessed: 4/12/2017     Social History     Socioeconomic History    Marital status: /Civil Union     Spouse name: None    Number of children: None    Years of education: None    Highest education level: None   Occupational History    None   Tobacco Use    Smoking status: Current Every Day Smoker     Packs/day: 0 50     Years: 48 00     Pack years: 24 00     Types: Cigarettes     Start date: 1970    Smokeless tobacco: Never Used   Vaping Use    Vaping Use: Every day    Substances: Nicotine   Substance and Sexual Activity    Alcohol use: No    Drug use: No    Sexual activity: None   Other Topics Concern    None   Social History Narrative    Working full time     Social Determinants of Health     Financial Resource Strain:     Difficulty of Paying Living Expenses:    Food Insecurity:     Worried About Running Out of Food in the Last Year:     Ran Out of Food in the Last Year:    Transportation Needs:     Lack of Transportation (Medical):      Lack of Transportation (Non-Medical):    Physical Activity:     Days of Exercise per Week:     Minutes of Exercise per Session:    Stress:     Feeling of Stress :    Social Connections:     Frequency of Communication with Friends and Family:     Frequency of Social Gatherings with Friends and Family:     Attends Christian Services:     Active Member of Clubs or Organizations:     Attends Club or Organization Meetings:     Marital Status:    Intimate Partner Violence:     Fear of Current or Ex-Partner:     Emotionally Abused:     Physically Abused:     Sexually Abused:        Subjective         Objective    Physical Exam:   Assessment Type: Assess only  General Appearance: (P) Awake  Respiratory Pattern: Normal  Chest Assessment: Chest expansion symmetrical  Bilateral Breath Sounds: Coarse    Vitals:  Blood pressure 136/61, pulse 74, temperature 98 °F (36 7 °C), resp  rate 17, height 5' 2" (1 575 m), weight 68 5 kg (151 lb), SpO2 95 %, not currently breastfeeding  Imaging and other studies: I have personally reviewed pertinent reports  Plan    Respiratory Plan: Home Bronchodilator Patient pathway        Resp Comments: (P) Pt admitted with post procedure pneumothorax  Pt stable no 3lnc at this time  UDN therapy started in ED as pt uses prn albuterol at home  Pt denies sob at this time  Plan is to cont  albuterol prn as ordered

## 2021-07-10 NOTE — ASSESSMENT & PLAN NOTE
Patient sent from pulmonologist office after developed shortness of breath on exertion secondary to postprocedural pneumothorax from lung biopsy 3 days ago    S/p chest tube placement, presently clamped  Pulmonary on board  Planned for possible removal tomorrow  S/p cxr

## 2021-07-10 NOTE — PROGRESS NOTES
[address] [facility]  Progress Note - Laurel Prom 1960, 57 y o  female MRN: [id number]  Unit/Bed#: [facility] 504-01 Encounter: [id number]  Primary Care Provider: Mahin George MD   Date and time admitted to hospital: 7/9/2021 12:17 PM    * Postprocedural pneumothorax  Assessment & Plan  Patient sent from pulmonologist office after developed shortness of breath on exertion secondary to postprocedural pneumothorax from lung biopsy 3 days ago  S/p chest tube placement, presently clamped  Pulmonary on board  Planned for possible removal tomorrow  S/p cxr    Solitary pulmonary nodule  Assessment & Plan  Status post biopsy 3 days ago, brushing, cytology neg for malignancy  Per pulm to consider IR percutaneous bx    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)  Assessment & Plan  No sing of acute exacerbation  Cont home inhalers    Continuous dependence on cigarette smoking  Assessment & Plan  Tobacco cessation counseling  Nicotine patch    Major depressive disorder, recurrent (HCC)  Assessment & Plan  Cont Wellbutrin    Coronary atherosclerosis  Assessment & Plan  Stable cont meds  Pt is cp free      VTE Pharmacologic Prophylaxis:   Pharmacologic: low risk  Mechanical VTE Prophylaxis in Place: Yes    Patient Centered Rounds: I have performed bedside rounds with nursing staff today  Discussions with Specialists or Other Care Team Provider:     Education and Discussions with Family / Patient: patient and her sister    Time Spent for Care: 30 minutes  More than 50% of total time spent on counseling and coordination of care as described above  Current Length of Stay: 1 day(s)    Current Patient Status: Inpatient   Certification Statement: The patient will continue to require additional inpatient hospital stay due to PNX    Discharge Plan:     Code Status: Level 1 - Full Code      Subjective:   Denies dyspnea   No acute events overnight    Objective: Vitals:   Temp (24hrs), Av °F (36 7 °C), Min:97 8 °F (36 6 °C), Max:98 1 °F (36 7 °C)    Temp:  [97 8 °F (36 6 °C)-98 1 °F (36 7 °C)] 97 8 °F (36 6 °C)  HR:  [64-76] 65  Resp:  [18-20] 18  BP: (121-128)/(63-76) 121/63  SpO2:  [90 %-95 %] 92 %  Body mass index is 27 62 kg/m²  Input and Output Summary (last 24 hours): Intake/Output Summary (Last 24 hours) at 7/10/2021 1826  Last data filed at 7/10/2021 1100  Gross per 24 hour   Intake 240 ml   Output 0 ml   Net 240 ml       Physical Exam:     Physical Exam  Cardiovascular:      Rate and Rhythm: Normal rate and regular rhythm  Pulses: Normal pulses  Heart sounds: Normal heart sounds  Pulmonary:      Effort: Pulmonary effort is normal  No respiratory distress  Breath sounds: No wheezing or rales  Comments: Diminshed BS  + Chest tube  Abdominal:      General: Abdomen is flat  Bowel sounds are normal  There is no distension  Palpations: Abdomen is soft  Tenderness: There is no abdominal tenderness  There is no guarding  Musculoskeletal:         General: Normal range of motion  Cervical back: Normal range of motion and neck supple  Right lower leg: No edema  Left lower leg: No edema  Skin:     General: Skin is warm and dry  Neurological:      General: No focal deficit present  Mental Status: She is alert and oriented to person, place, and time  Mental status is at baseline  Cranial Nerves: No cranial nerve deficit  Motor: No weakness           Additional Data:     Labs:    Results from last 7 days   Lab Units 07/10/21  0513 21  1319   WBC Thousand/uL 7 95 11 83*   HEMOGLOBIN g/dL 14 3 16 3*   HEMATOCRIT % 43 9 48 1*   PLATELETS Thousands/uL 161 181   NEUTROS PCT %  --  76*   LYMPHS PCT %  --  15   MONOS PCT %  --  7   EOS PCT %  --  2     Results from last 7 days   Lab Units 07/10/21  0513   SODIUM mmol/L 137   POTASSIUM mmol/L 3 9   CHLORIDE mmol/L 108   CO2 mmol/L 28   BUN mg/dL 14 CREATININE mg/dL 0 70   ANION GAP mmol/L 1*   CALCIUM mg/dL 8 8   GLUCOSE RANDOM mg/dL 82     Results from last 7 days   Lab Units 07/09/21  1319   INR  0 96                   * I Have Reviewed All Lab Data Listed Above  * Additional Pertinent Lab Tests Reviewed: All Labs Within Last 24 Hours Reviewed    Imaging:    Imaging Reports Reviewed Today Include:   Imaging Personally Reviewed by Myself Includes:      Recent Cultures (last 7 days):     Results from last 7 days   Lab Units 07/06/21  0956   GRAM STAIN RESULT  Rare Polys  No organisms seen       Last 24 Hours Medication List:   Current Facility-Administered Medications   Medication Dose Route Frequency Provider Last Rate    acetaminophen  650 mg Oral Q6H PRN Rani Ruth MD      albuterol  2 5 mg Nebulization Q4H PRN Rani Ruth MD      buPROPion  300 mg Oral QAM Rani Ruth MD      cyclobenzaprine  5 mg Oral TID PRN Rani Ruth MD      ipratropium-albuterol  3 mL Nebulization BID Kinza Denny MD      nicotine  1 patch Transdermal Q24H Rani Ruth MD      ondansetron  4 mg Intravenous Q6H PRN Rani Ruth MD      oxyCODONE-acetaminophen  2 tablet Oral Q4H PRN Rani Ruth MD      pantoprazole  40 mg Oral Daily Rani Ruth MD      pravastatin  40 mg Oral Daily With Adelina Champagne MD      verapamil  240 mg Oral Daily Rani Ruth MD          Today, Patient Was Seen By: Jenny Mulligan DO    ** Please Note: Dictation voice to text software may have been used in the creation of this document   **

## 2021-07-10 NOTE — CONSULTS
Consult Note - Pulmonary   Amy Horn 57 y o  female MRN: [id number]  Unit/Bed#: [facility] 504-01 Encounter: [id number]      Reason for consultation: Post procedural pneumothorax    Requesting physician: Gabriela Ku MD    Assessment:    1  Large left pneumothorax- following navigation bronchoscopy on 07/06 with worsening since then s/p left chest tube placement  No air leak but not tidaling either  2  Left upper lobe subcentimeter solitary pulmonary nodule- has showed interval growth since 2018  S/p Tye Bronch  Pathology and cytology nondiagnostic  3  Moderate COPD w/o exacerbation- was on Lynnetta Jeny as an outpatient but switched to stiolto on 06/30 due to thrush and was treated with nystatin for 5 days  4  Chronic tobacco abuse- recently quit smoking  On wellbutryn and nicotine patch    Plan:    - Continue chest tube to wall suction  - Repeat CXR this morning  - If CXR shows reinflation of lung, will place to water seal and monitor  - Continue supplemental O2 as needed  Currently on 2L with O2 sat 93%  Goal is 88-92%  - Stop breo as replacement for wixela since this was discontinued as outpatient due to thrush  - Start duonebs BID   - Incentive spirometry  - Would consider IR biopsy of left apical nodule if amenable to percutaneous biopsy      Case was and to be discussed with Dr Barrington Ann    History of Present Illness   HPI:  Emmanuel Haney is a 57 y o  female who  Presents to the hospital with left-sided chest pain and shortness of breath  Patient has a history of a left upper lobe lung nodule and recently underwent a navigation bronchoscopy with biopsy and endobronchial ultrasound with biopsy on July 6  Following the procedure she had a small left apical pneumothorax which was stable and she was discharged home    The patient followed up with Dr Amy Chanel yesterday and was recommended that she have a repeat chest x-ray to ensure resolution of the pneumothorax however this captured large left pneumothorax and the patient was sent to the emergency department for further evaluation  Interventional Radiology was consulted and the patient had a left side chest tube placed  Review of Systems   Constitutional: Negative for chills and fever  HENT: Negative for congestion, postnasal drip and rhinorrhea  Eyes: Negative for itching  Respiratory: Positive for shortness of breath  Negative for cough, wheezing and stridor  Cardiovascular: Positive for chest pain  Negative for palpitations and leg swelling  Left-sided chest pain   Gastrointestinal: Negative for abdominal distention, abdominal pain, nausea and vomiting  Genitourinary: Negative for dysuria and flank pain  Musculoskeletal: Negative for arthralgias and myalgias  Skin: Negative for color change  Neurological: Negative for dizziness, light-headedness and headaches  Psychiatric/Behavioral: Negative  Historical Information   Past Medical History:   Diagnosis Date    COPD [facility]chronic obstructive [facility] disease) [facility] )     GERD (gastroesophageal reflux disease)     Hypercholesterolemia     Iliotibial band tendonitis     Last Assessed: 6/5/2015    Migraine     Prinzmetal angina ([facility] )     Last Assessed: 5/23/2017    Sciatica     Last Assessed: 11/10/2014    Tobacco abuse 4/19/2018    Vertigo     Last Assessed: 4/12/2017     Past Surgical History:   Procedure Laterality Date    COLONOSCOPY  06/2011    Complete: Dr Karthikeyan Strickland - due in 5 years, Colonoscopy Nov 2017, Diverticulosis, 5 mm polyp, Due in 5 years    ESOPHAGOGASTRODUODENOSCOPY      Diagnostic;  Last Assessed: 7/11/2014    FLEXOR TENDON REPAIR      left finger    HYSTERECTOMY      @ [facility] 28    IR CHEST TUBE PLACEMENT  7/9/2021    OK LARYNGOSCOPY,DIRECT,DIAGNOSTIC N/A 5/16/2019    Procedure: LARYNGOSCOPY DIRECT, FLEXIBLE BRONCHOSCOPY, FLEXIBLE ESOPHAGOSCOPY;  Surgeon: Syd Cowart MD;  [facility]: [facility];  Service: ENT    SHOULDER ARTHROSCOPY Right     SHOULDER SURGERY      TOTAL ABDOMINAL HYSTERECTOMY W/ BILATERAL SALPINGOOPHORECTOMY      endometriosis;  Last Assessed: 5/4/2015     Family History   Problem Relation Age of Onset    Lung cancer Mother 47    Other Father         Dyslipidemia    Diabetes Sister     Hypertension Family     Ovarian cancer Maternal Grandmother 48    No Known Problems Daughter     No Known Problems Maternal Grandfather     Lung cancer Paternal Grandmother     No Known Problems Paternal Grandfather     No Known Problems Sister     No Known Problems Sister     No Known Problems Daughter     No Known Problems Maternal Aunt     No Known Problems Paternal Aunt     No Known Problems Paternal Aunt     No Known Problems Paternal Aunt        Occupational History: noncontributory    Social History: smoked for many years and vaping, denies drugs or alcohol abuse    Meds/Allergies   Current Facility-Administered Medications   Medication Dose Route Frequency    acetaminophen (TYLENOL) tablet 650 mg  650 mg Oral Q6H PRN    albuterol inhalation solution 2 5 mg  2 5 mg Nebulization Q4H PRN    buPROPion (WELLBUTRIN XL) 24 hr tablet 300 mg  300 mg Oral QAM    cyclobenzaprine (FLEXERIL) tablet 5 mg  5 mg Oral TID PRN    fluticasone-vilanterol (BREO ELLIPTA) 200-25 MCG/INH inhaler 1 puff  1 puff Inhalation Daily    nicotine (NICODERM CQ) 14 mg/24hr TD 24 hr patch 1 patch  1 patch Transdermal Q24H    ondansetron (ZOFRAN) injection 4 mg  4 mg Intravenous Q6H PRN    oxyCODONE-acetaminophen (PERCOCET) 5-325 mg per tablet 2 tablet  2 tablet Oral Q4H PRN    pantoprazole (PROTONIX) EC tablet 40 mg  40 mg Oral Daily    pravastatin (PRAVACHOL) tablet 40 mg  40 mg Oral Daily With Dinner    verapamil (CALAN-SR) CR tablet 240 mg  240 mg Oral Daily     Medications Prior to Admission   Medication    nicotine (NICODERM CQ) 21 mg/24 hr TD 24 hr patch    albuterol (2 5 mg/3 mL) 0 083 % nebulizer solution    Albuterol Sulfate (ProAir RespiClick) 764 (34 Base) MCG/ACT AEPB    aspirin (ECOTRIN LOW STRENGTH) 81 mg EC tablet    buPROPion (WELLBUTRIN XL) 300 mg 24 hr tablet    cyclobenzaprine (FLEXERIL) 10 mg tablet    famotidine (PEPCID) 40 MG tablet    ibuprofen (MOTRIN) 600 mg tablet    nicotine (NICODERM CQ) 14 mg/24hr TD 24 hr patch    nitroglycerin (Nitrostat) 0 4 mg SL tablet    nystatin (MYCOSTATIN) 500,000 units/5 mL suspension    oxyCODONE-acetaminophen (PERCOCET) 5-325 mg per tablet    pantoprazole (PROTONIX) 40 mg tablet    rosuvastatin (CRESTOR) 20 MG tablet    tiotropium-olodaterol (Stiolto Respimat) 2 5-2 5 MCG/ACT inhaler    verapamil (CALAN-SR) 240 mg CR tablet     Allergies   Allergen Reactions    Darvon [Propoxyphene] Itching       Vitals:    07/09/21 1800 07/09/21 2201 07/10/21 0003 07/10/21 0725   BP:   124/64 128/76   BP Location:       Pulse:   64 76   Resp:   18 20   Temp:   98 1 °F (36 7 °C) 98 1 °F (36 7 °C)   TempSrc:       SpO2: 95% 95% 95% 93%   Weight:       Height:           Physical Exam  Constitutional:       General: She is not in acute distress  Appearance: She is not diaphoretic  HENT:      Head: Normocephalic and atraumatic  Nose: Nose normal       Mouth/Throat:      Pharynx: No oropharyngeal exudate  Eyes:      General: No scleral icterus  Conjunctiva/sclera: Conjunctivae normal       Pupils: Pupils are equal, round, and reactive to light  Neck:      Thyroid: No thyromegaly  Vascular: No JVD  Trachea: No tracheal deviation  Cardiovascular:      Rate and Rhythm: Normal rate and regular rhythm  Heart sounds: Normal heart sounds  No murmur heard  No friction rub  No gallop  Pulmonary:      Effort: Pulmonary effort is normal  No respiratory distress  Breath sounds: Normal breath sounds  No stridor  No wheezing or rales  Comments: Right basilar crackles, decreased left sided breath sounds  Abdominal:      General: Bowel sounds are normal  There is no distension  Palpations: Abdomen is soft  Tenderness: There is no abdominal tenderness  There is no guarding or rebound  Musculoskeletal:         General: No deformity  Normal range of motion  Cervical back: Normal range of motion and neck supple  Lymphadenopathy:      Cervical: No cervical adenopathy  Skin:     General: Skin is warm  Findings: No erythema or rash  Neurological:      Mental Status: She is alert and oriented to person, place, and time  Cranial Nerves: No cranial nerve deficit  Sensory: No sensory deficit  Labs: I have personally reviewed pertinent lab results  Results from last 7 days   Lab Units 07/10/21  0513 07/09/21  1319   WBC Thousand/uL 7 95 11 83*   HEMOGLOBIN g/dL 14 3 16 3*   HEMATOCRIT % 43 9 48 1*   PLATELETS Thousands/uL 161 181   NEUTROS PCT %  --  76*   MONOS PCT %  --  7      Results from last 7 days   Lab Units 07/10/21  0513 07/09/21  1319   POTASSIUM mmol/L 3 9 3 5   CHLORIDE mmol/L 108 108   CO2 mmol/L 28 25   BUN mg/dL 14 10   CREATININE mg/dL 0 70 0 70   CALCIUM mg/dL 8 8 9 3              Results from last 7 days   Lab Units 07/09/21  1319 07/06/21  0712   INR  0 96 0 96   PTT seconds 25  --          0   Lab Value Date/Time    TROPONINI <0 02 07/09/2021 1319       Imaging and other studies: I have personally reviewed pertinent reports  and I have personally reviewed pertinent films in PACS  CXR- large left pneumothorax    Pulmonary function testing: moderate obstructive airflow defect and moderately reduced diffusion capacity    EKG, Pathology, and Other Studies: I have personally reviewed pertinent reports     and I have personally reviewed pertinent films in PACS    Code Status: Level 1 - Full Luis Lerner MD  Pulmonary & Critical Care Fellow, [facility]

## 2021-07-10 NOTE — ASSESSMENT & PLAN NOTE
Status post biopsy 3 days ago, brushing, cytology neg for malignancy  Per pulm to consider IR percutaneous bx

## 2021-07-11 ENCOUNTER — APPOINTMENT (INPATIENT)
Dept: RADIOLOGY | Facility: HOSPITAL | Age: 61
DRG: 200 | End: 2021-07-11
Payer: COMMERCIAL

## 2021-07-11 PROCEDURE — 99232 SBSQ HOSP IP/OBS MODERATE 35: CPT | Performed by: INTERNAL MEDICINE

## 2021-07-11 PROCEDURE — 71045 X-RAY EXAM CHEST 1 VIEW: CPT

## 2021-07-11 PROCEDURE — 94760 N-INVAS EAR/PLS OXIMETRY 1: CPT

## 2021-07-11 PROCEDURE — 94640 AIRWAY INHALATION TREATMENT: CPT

## 2021-07-11 RX ORDER — GINSENG 100 MG
1 CAPSULE ORAL 2 TIMES DAILY
Status: DISCONTINUED | OUTPATIENT
Start: 2021-07-11 | End: 2021-07-13 | Stop reason: HOSPADM

## 2021-07-11 RX ADMIN — PRAVASTATIN SODIUM 40 MG: 40 TABLET ORAL at 15:44

## 2021-07-11 RX ADMIN — BACITRACIN 1 SMALL APPLICATION: 500 OINTMENT TOPICAL at 19:51

## 2021-07-11 RX ADMIN — Medication 1 PATCH: at 15:43

## 2021-07-11 RX ADMIN — VERAPAMIL HYDROCHLORIDE 240 MG: 240 TABLET ORAL at 08:15

## 2021-07-11 RX ADMIN — IPRATROPIUM BROMIDE AND ALBUTEROL SULFATE 3 ML: 2.5; .5 SOLUTION RESPIRATORY (INHALATION) at 08:21

## 2021-07-11 RX ADMIN — IPRATROPIUM BROMIDE AND ALBUTEROL SULFATE 3 ML: 2.5; .5 SOLUTION RESPIRATORY (INHALATION) at 19:18

## 2021-07-11 RX ADMIN — ACETAMINOPHEN 650 MG: 325 TABLET, FILM COATED ORAL at 19:57

## 2021-07-11 RX ADMIN — BUPROPION HYDROCHLORIDE 300 MG: 150 TABLET, FILM COATED, EXTENDED RELEASE ORAL at 08:15

## 2021-07-11 RX ADMIN — PANTOPRAZOLE SODIUM 40 MG: 40 TABLET, DELAYED RELEASE ORAL at 08:14

## 2021-07-11 RX ADMIN — OXYCODONE HYDROCHLORIDE AND ACETAMINOPHEN 2 TABLET: 5; 325 TABLET ORAL at 09:22

## 2021-07-11 NOTE — PROGRESS NOTES
Progress Note - Pulmonary   Del Zainab Horn 57 y o  female MRN: [id number]  Unit/Bed#: [facility] 504-01 Encounter: [id number]      Assessment:     1  Large left pneumothorax- following navigation bronchoscopy on 07/06 with worsening since then s/p left chest tube placement  No air leak but not tidaling either  2  Left upper lobe subcentimeter solitary pulmonary nodule- has showed interval growth since 2018  S/p Tye Bronch  Pathology and cytology nondiagnostic  3  Moderate COPD w/o exacerbation- was on Ольга Morales as an outpatient but switched to stiolto on 06/30 due to thrush and was treated with nystatin for 5 days  4  Chronic tobacco abuse- recently quit smoking  On wellbutryn and nicotine patch     Plan:     - Attempted to clamp chest tube yesterday but apparently after several hours, she had left sided pleuritic chest pain and therefore tube was unclamped  - Tube clamped today by me at bedside and discussed with nursing and primary  - If patient has SOB, please contact pulmonary immediately  - Plan to repeat CXR tomorrow morning  - Continue supplemental O2 as needed  Currently on 2L with O2 sat 93%  Goal is 88-92%  - Stoped breo as replacement for wixela since this was discontinued as outpatient due to thrush  - Continue duonebs BID   - Incentive spirometry  - F/up with pulm as outpatient for management of solitary lung nodule    Subjective:   Patient seen and examined bedside  Chest tube was clamped yesterday but patient experienced chest pain after several hours and therefore it was unclamped  She feels well today  Review of Systems   Constitutional: Negative for chills and fever  HENT: Negative for congestion, postnasal drip and rhinorrhea  Eyes: Negative for itching  Respiratory: Negative for cough, shortness of breath, wheezing and stridor  Cardiovascular: Negative for chest pain, palpitations and leg swelling     Gastrointestinal: Negative for abdominal distention, abdominal pain, nausea and vomiting  Genitourinary: Negative for dysuria and flank pain  Musculoskeletal: Negative for arthralgias and myalgias  Skin: Negative for color change  Neurological: Negative for dizziness, light-headedness and headaches  Psychiatric/Behavioral: Negative  Objective:     Vitals:    07/11/21 0610 07/11/21 0800 07/11/21 0807 07/11/21 0821   BP: 142/79  142/77    BP Location: Right arm      Pulse: 65  77    Resp: 19      Temp: 97 9 °F (36 6 °C)      TempSrc: Oral      SpO2: (!) 89% 90% 91% 94%   Weight:       Height:               Intake/Output Summary (Last 24 hours) at 7/11/2021 1329  Last data filed at 7/11/2021 0751  Gross per 24 hour   Intake 1200 ml   Output 800 ml   Net 400 ml         Physical Exam  Constitutional:       General: She is not in acute distress  Appearance: She is not diaphoretic  HENT:      Head: Normocephalic and atraumatic  Nose: Nose normal       Mouth/Throat:      Pharynx: No oropharyngeal exudate  Eyes:      General: No scleral icterus  Conjunctiva/sclera: Conjunctivae normal       Pupils: Pupils are equal, round, and reactive to light  Neck:      Thyroid: No thyromegaly  Vascular: No JVD  Trachea: No tracheal deviation  Cardiovascular:      Rate and Rhythm: Normal rate and regular rhythm  Heart sounds: Normal heart sounds  No murmur heard  No friction rub  No gallop  Pulmonary:      Effort: Pulmonary effort is normal  No respiratory distress  Breath sounds: Normal breath sounds  No stridor  No wheezing or rales  Abdominal:      General: Bowel sounds are normal  There is no distension  Palpations: Abdomen is soft  Tenderness: There is no abdominal tenderness  There is no guarding or rebound  Musculoskeletal:         General: No deformity  Normal range of motion  Cervical back: Normal range of motion and neck supple  Lymphadenopathy:      Cervical: No cervical adenopathy  Skin:     General: Skin is warm  Findings: No erythema or rash  Neurological:      Mental Status: She is alert and oriented to person, place, and time  Cranial Nerves: No cranial nerve deficit  Sensory: No sensory deficit  Labs: I have personally reviewed pertinent lab results  Results from last 7 days   Lab Units 07/10/21  0513 07/09/21  1319   WBC Thousand/uL 7 95 11 83*   HEMOGLOBIN g/dL 14 3 16 3*   HEMATOCRIT % 43 9 48 1*   PLATELETS Thousands/uL 161 181   NEUTROS PCT %  --  76*   MONOS PCT %  --  7      Results from last 7 days   Lab Units 07/10/21  0513 07/09/21  1319   POTASSIUM mmol/L 3 9 3 5   CHLORIDE mmol/L 108 108   CO2 mmol/L 28 25   BUN mg/dL 14 10   CREATININE mg/dL 0 70 0 70   CALCIUM mg/dL 8 8 9 3              Results from last 7 days   Lab Units 07/09/21  1319 07/06/21  0712   INR  0 96 0 96   PTT seconds 25  --          0   Lab Value Date/Time    TROPONINI <0 02 07/09/2021 1319       Microbiology:  Reviewed    Imaging and other studies: I have personally reviewed pertinent reports     and I have personally reviewed pertinent films in PACS  CXR- clear bilateral lung fields with the exception of a left upper lung field nodule      Josefina Kingston MD  Pulmonary & Critical Care Fellow, [address] [place] [address] [facility]

## 2021-07-11 NOTE — PROGRESS NOTES
[facility]  Progress Note - Mert Kingar 1960, 57 y o  female MRN: [id number]  Unit/Bed#: [facility] 504-01 Encounter: [id number]  Primary Care Provider: Adela Hooks MD   Date and time admitted to hospital: 7/9/2021 12:17 PM    * Postprocedural pneumothorax  Assessment & Plan  Patient sent from pulmonologist office after developed shortness of breath on exertion secondary to postprocedural pneumothorax from lung biopsy 3 days ago  Pulmonary following  S/p chest tube placement  CT was clamped from yesterday during the day  However overnight CT due to pleuritic ct was unclamped CT reclamped per pulm  Puilm to be notified if has to be unclamped  POx range of 90-92%, attempted to take off oxygen desaturated to 88%    Solitary pulmonary nodule  Assessment & Plan  Status post biopsy 3 days ago, brushing, cytology neg for malignancy  Per pulm to consider IR percutaneous bx    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)  Assessment & Plan  No sing of acute exacerbation  Cont home inhalers    Continuous dependence on cigarette smoking  Assessment & Plan  Tobacco cessation counseling  Nicotine patch    Major depressive disorder, recurrent (HCC)  Assessment & Plan  Cont Wellbutrin    Coronary atherosclerosis  Assessment & Plan  Stable cont meds  Pt is cp free    VTE Pharmacologic Prophylaxis:   Pharmacologic: low risk  Mechanical VTE Prophylaxis in Place: Yes    Patient Centered Rounds: I have performed bedside rounds with nursing staff today  Discussions with Specialists or Other Care Team Provider: Pulmonary    Education and Discussions with Family / Patient: Patient    Time Spent for Care: 30 minutes  More than 50% of total time spent on counseling and coordination of care as described above      Current Length of Stay: 2 day(s)    Current Patient Status: Inpatient   Certification Statement: The patient will continue to require additional inpatient hospital stay due to pnx with ct in place    Discharge Plan:     Code Status: Level 1 - Full Code      Subjective:   Overnight pt had pleuritic cp thus CT was unclamped  Has now been reclamped per pulm  On eval this am denies cp or sob  POX 90-91%, attempted to take off oxygen desat to 88%  Objective:     Vitals:   Temp (24hrs), Av 9 °F (36 6 °C), Min:97 8 °F (36 6 °C), Max:98 1 °F (36 7 °C)    Temp:  [97 8 °F (36 6 °C)-98 1 °F (36 7 °C)] 97 9 °F (36 6 °C)  HR:  [65-77] 77  Resp:  [16-19] 19  BP: (120-142)/(63-79) 142/77  SpO2:  [89 %-94 %] 94 %  Body mass index is 27 62 kg/m²  Input and Output Summary (last 24 hours): Intake/Output Summary (Last 24 hours) at 2021 1349  Last data filed at 2021 0751  Gross per 24 hour   Intake 1200 ml   Output 800 ml   Net 400 ml       Physical Exam:     Physical Exam  Cardiovascular:      Rate and Rhythm: Normal rate and regular rhythm  Pulses: Normal pulses  Heart sounds: Normal heart sounds  No murmur heard  Pulmonary:      Effort: No respiratory distress  Breath sounds: Normal breath sounds  No wheezing or rales  Abdominal:      General: Abdomen is flat  Bowel sounds are normal  There is no distension  Palpations: Abdomen is soft  Tenderness: There is no abdominal tenderness  There is no guarding  Musculoskeletal:         General: Normal range of motion  Cervical back: Normal range of motion and neck supple  Right lower leg: No edema  Left lower leg: No edema  Skin:     General: Skin is warm and dry  Neurological:      General: No focal deficit present  Mental Status: She is alert and oriented to person, place, and time  Mental status is at baseline  Cranial Nerves: No cranial nerve deficit  Motor: No weakness             Additional Data:     Labs:    Results from last 7 days   Lab Units 07/10/21  0513 21  1319   WBC Thousand/uL 7 95 11 83*   HEMOGLOBIN g/dL 14 3 16 3*   HEMATOCRIT % 43 9 48 1*   PLATELETS Thousands/uL 161 181   NEUTROS PCT %  --  76*   LYMPHS PCT %  --  15   MONOS PCT %  --  7   EOS PCT %  --  2     Results from last 7 days   Lab Units 07/10/21  0513   SODIUM mmol/L 137   POTASSIUM mmol/L 3 9   CHLORIDE mmol/L 108   CO2 mmol/L 28   BUN mg/dL 14   CREATININE mg/dL 0 70   ANION GAP mmol/L 1*   CALCIUM mg/dL 8 8   GLUCOSE RANDOM mg/dL 82     Results from last 7 days   Lab Units 07/09/21  1319   INR  0 96                       * I Have Reviewed All Lab Data Listed Above  * Additional Pertinent Lab Tests Reviewed: All Labs Within Last 24 Hours Reviewed    Imaging:    Imaging Reports Reviewed Today Include:   Imaging Personally Reviewed by Myself Includes:      Recent Cultures (last 7 days):     Results from last 7 days   Lab Units 07/06/21  0956   GRAM STAIN RESULT  Rare Polys  No organisms seen       Last 24 Hours Medication List:   Current Facility-Administered Medications   Medication Dose Route Frequency Provider Last Rate    acetaminophen  650 mg Oral Q6H PRN Lavelle Alaniz MD      albuterol  2 5 mg Nebulization Q4H PRN Lavelle Alaniz MD      buPROPion  300 mg Oral QAM Lavelle Alaniz MD      cyclobenzaprine  5 mg Oral TID PRN Lavelle Alaniz MD      ipratropium-albuterol  3 mL Nebulization BID Rolf Nelson MD      nicotine  1 patch Transdermal Q24H Lavelle Alaniz MD      ondansetron  4 mg Intravenous Q6H PRN Lavelle Alaniz MD      oxyCODONE-acetaminophen  2 tablet Oral Q4H PRN Lavelle Alaniz MD      pantoprazole  40 mg Oral Daily Lavelle Alaniz MD      pravastatin  40 mg Oral Daily With Cora Raman MD      verapamil  240 mg Oral Daily Lavelle Alaniz MD          Today, Patient Was Seen By: Norbert Boland DO    ** Please Note: Dictation voice to text software may have been used in the creation of this document   **

## 2021-07-11 NOTE — ASSESSMENT & PLAN NOTE
Patient sent from pulmonologist office after developed shortness of breath on exertion secondary to postprocedural pneumothorax from lung biopsy 3 days ago  Pulmonary following  S/p chest tube placement  CT was clamped from yesterday during the day  However overnight CT due to pleuritic ct was unclamped CT reclamped per pulm   Puilm to be notified if has to be unclamped  POx range of 90-92%, attempted to take off oxygen desaturated to 88%

## 2021-07-11 NOTE — QUICK NOTE
Patients' chest tube was apparently unclamped last night due to left pleuritic chest pain  Chest tube clamped now  If patient experiences chest pain, please notify Pulmonary STAT       Brian Wyman MD  Pulmonary and Critical Care Fellow- PGY6  [facility]

## 2021-07-11 NOTE — UTILIZATION REVIEW
Initial Clinical Review    Admission: Date/Time/Statement:   Admission Orders (From admission, onward)     Ordered        07/09/21 1447  Inpatient Admission  Once                   Orders Placed This Encounter   Procedures    Inpatient Admission     Standing Status:   Standing     Number of Occurrences:   1     Order Specific Question:   Level of Care     Answer:   Med Surg [16]     Order Specific Question:   Estimated length of stay     Answer:   More than 2 Midnights     Order Specific Question:   Certification     Answer:   I certify that inpatient services are medically necessary for this patient for a duration of greater than two midnights  See H&P and MD Progress Notes for additional information about the patient's course of treatment  ED Arrival Information     Expected Arrival Acuity    - 7/9/2021 11:57 Emergent         Means of arrival Escorted by Service Admission type    [facility] Emergency         Arrival complaint    sob        Chief Complaint   Patient presents with    Shortness of Breath     Patient had biopsy on Tuesday and had small pneumothorax, increasing SOB, had repeat CXR and pneumthorax had increased in size- sent here by Dr Rousseau  Initial Presentation: 56 y/o female to ED from pulmonologist's office with pneumothorax  Pt had a L lung biopsy on Tuesday, and had small postprocedural PTX and was sent home under close monitoring  Today patient developed sob with exertion  CXR this AM showed expanding left-sided PTX and sent to the ED for eval by IR and chest tube placement  Pt is currently sat'ing at 90% on RA, no acute distress  C/o pain after chest tube placement  No worsening dyspnea, no fever or chills  WBC 11 83, Hgb 16 3, Hct 48  1  continue CT on cont wall suction  O2 currently @ 2L nc  Nicotine patch  Continue previous home po meds  Resume inhalers      IR Procedure note 7/9 -- Findings: Successful 10 Serbian left chest tube placement    Complications: None immediate  Pulm consult 7/10 -- Plan: Continue CT to wall suction  Repeat CXR this morning  If CXR shows reinflation of lung, will place to water seal and monitor  Continue supplemental O2 as needed  Currently on 2L with O2 sat 93%  Goal is 88-92%  Stop breo as replacement for wixela since this was d/c'd as outpatient d/t thrush  Start duonebs BID  Incentive spirometry   Would consider IR biopsy of left apical nodule if amenable to percutaneous biopsy       ED Triage Vitals [07/09/21 1223]   Temperature Pulse Respirations Blood Pressure SpO2   98 4 °F (36 9 °C) 89 22 144/68 92 %      Temp Source Heart Rate Source Patient Position - Orthostatic VS BP Location FiO2 (%)   Oral Monitor Sitting Right arm --      Pain Score       5          Wt Readings from Last 1 Encounters:   07/09/21 68 5 kg (151 lb)     Additional Vital Signs:   Date/Time  Temp  Pulse  Resp  BP  MAP (mmHg)  SpO2  Calculated FIO2 (%) - Nasal Cannula  Nasal Cannula O2 Flow Rate (L/min)  O2 Device  Patient Position - Orthostatic VS   07/10/21 23:27:33  98 1 °F (36 7 °C)  71  16  120/68  85  89 %Abnormal   --  --  --  --   07/10/21 1945  --  --  --  --  --  --  28  2 L/min  Nasal cannula  --   07/10/21 14:53:56  97 8 °F (36 6 °C)  65  18  121/63  82  90 %  --  --  None (Room air)  Lying   07/10/21 0836  --  --  --  --  --  94 %  28  2 L/min  Nasal cannula  --   07/10/21 07:25:31  98 1 °F (36 7 °C)  76  20  128/76  93  93 %  --  --  Nasal cannula  Lying   07/09/21 1800  --  --  --  --  --  95 %  28  2 L/min  --  --   07/09/21 1506  --  80  18  158/107Abnormal   124  95 %  --  --  None (Room air)  --   07/09/21 1223  98 4 °F (36 9 °C)  89  22  144/68  --  92 %  --  --  None (Room air)  Sitting       Pertinent Labs/Diagnostic Test Results:   CXR 7/9 -- Large left-sided pneumothorax, significantly increased in size compared to the most recent examination with no mediastinal shift indicate tension   There is increasing subcutaneous emphysema identified within the left chest and neck  The patient unfortunately left the department but was able to be reached by cell phone and asked to return to the department   The patient did return and the referring clinician was notified of this finding  Luis Thornton was taken to the emergency department  for treatment       CXR 7/11 -- Resolution of previous left-sided pneumothorax status post chest tube placement         Results from last 7 days   Lab Units 07/10/21  0513 07/09/21  1319   WBC Thousand/uL 7 95 11 83*   HEMOGLOBIN g/dL 14 3 16 3*   HEMATOCRIT % 43 9 48 1*   PLATELETS Thousands/uL 161 181   NEUTROS ABS Thousands/µL  --  8 95*       Results from last 7 days   Lab Units 07/10/21  0513 07/09/21  1319   SODIUM mmol/L 137 139   POTASSIUM mmol/L 3 9 3 5   CHLORIDE mmol/L 108 108   CO2 mmol/L 28 25   ANION GAP mmol/L 1* 6   BUN mg/dL 14 10   CREATININE mg/dL 0 70 0 70   EGFR ml/min/1 73sq m 93 93   CALCIUM mg/dL 8 8 9 3     Results from last 7 days   Lab Units 07/10/21  0513 07/09/21  1319   GLUCOSE RANDOM mg/dL 82 97     Results from last 7 days   Lab Units 07/09/21  1319   TROPONIN I ng/mL <0 02     Results from last 7 days   Lab Units 07/09/21  1319 07/06/21  0712   PROTIME seconds 12 8 12 7   INR  0 96 0 96   PTT seconds 25  --        Results from last 7 days   Lab Units 07/06/21  0956   GRAM STAIN RESULT  Rare Polys  No organisms seen         ED Treatment:   Medication Administration from 07/09/2021 1157 to 07/09/2021 1720       Date/Time Order Dose Route Action     07/09/2021 1646 oxyCODONE-acetaminophen (PERCOCET) 5-325 mg per tablet 2 tablet 2 tablet Oral Given     07/09/2021 1540 midazolam (VERSED) injection 0 5 mg Intravenous Given     07/09/2021 1535 midazolam (VERSED) injection 1 mg Intravenous Given     07/09/2021 1526 midazolam (VERSED) injection 0 5 mg Intravenous Given     07/09/2021 1520 midazolam (VERSED) injection 0 5 mg Intravenous Given     07/09/2021 1514 midazolam (VERSED) injection 0 5 mg Intravenous Given     07/09/2021 1540 fentanyl citrate (PF) 100 MCG/2ML 50 mcg Intravenous Given     07/09/2021 1535 fentanyl citrate (PF) 100 MCG/2ML 50 mcg Intravenous Given     07/09/2021 1526 fentanyl citrate (PF) 100 MCG/2ML 25 mcg Intravenous Given     07/09/2021 1520 fentanyl citrate (PF) 100 MCG/2ML 25 mcg Intravenous Given     07/09/2021 1514 fentanyl citrate (PF) 100 MCG/2ML 50 mcg Intravenous Given     07/09/2021 1521 diphenhydrAMINE (BENADRYL) injection 25 mg Intravenous Given     07/09/2021 1514 diphenhydrAMINE (BENADRYL) injection 25 mg Intravenous Given     07/09/2021 1535 lidocaine 1% buffered 20 mL Infiltration Given     Past Medical History:   Diagnosis Date    COPD (chronic obstructive pulmonary disease) ([facility] )     GERD (gastroesophageal reflux disease)     Hypercholesterolemia     Iliotibial band tendonitis     Last Assessed: 6/5/2015    Migraine     Prinzmetal angina (HCC)     Last Assessed: 5/23/2017    Sciatica     Last Assessed: 11/10/2014    Tobacco abuse 4/19/2018    Vertigo     Last Assessed: 4/12/2017     Present on Admission:   Coronary atherosclerosis   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  ([facility] )   Major depressive disorder, recurrent ([facility])   Continuous dependence on cigarette smoking   Solitary pulmonary nodule      Admitting Diagnosis: SOB (shortness of breath) [R06 02]  Dyspnea on exertion [R06 00]  Pneumothorax after biopsy [J95 811]  Postprocedural pneumothorax [J95 811]  Moderate COPD (chronic obstructive pulmonary disease) ([facility] ) [J44 9]  Age/Sex: 57 y o  female  Admission Orders:  Scheduled Medications:  bacitracin, 1 small application, Topical, BID  buPROPion, 300 mg, Oral, QAM  ipratropium-albuterol, 3 mL, Nebulization, BID  nicotine, 1 patch, Transdermal, Q24H  pantoprazole, 40 mg, Oral, Daily  pravastatin, 40 mg, Oral, Daily With Dinner  verapamil, 240 mg, Oral, Daily    PRN Meds:  acetaminophen, 650 mg, Oral, Q6H PRN  albuterol, 2 5 mg, Nebulization, Q4H PRN  cyclobenzaprine, 5 mg, Oral, TID PRN  ondansetron, 4 mg, Intravenous, Q6H PRN  oxyCODONE-acetaminophen, 2 tablet, Oral, Q4H PRN 7/9 x2, 7/10 x4, 7/11 x1      Network Utilization Review Department  ATTENTION: Please call with any questions or concerns to [phone] and carefully listen to the prompts so that you are directed to the right person  All voicemails are confidential   Yusra Query all requests for admission clinical reviews, approved or denied determinations and any other requests to dedicated fax number below belonging to the campus where the patient is receiving treatment   List of dedicated fax numbers for the Facilities:  [address] DENIALS (Administrative/Medical Necessity) [phone]   [address] (Maternity/NICU/Pediatrics) [phone]   [address] [address] [place] [address] [phone] [facility] [phone]

## 2021-07-12 ENCOUNTER — APPOINTMENT (INPATIENT)
Dept: RADIOLOGY | Facility: HOSPITAL | Age: 61
DRG: 200 | End: 2021-07-12
Payer: COMMERCIAL

## 2021-07-12 ENCOUNTER — APPOINTMENT (OUTPATIENT)
Dept: PULMONOLOGY | Facility: CLINIC | Age: 61
End: 2021-07-12
Payer: COMMERCIAL

## 2021-07-12 PROBLEM — R06.89 RESPIRATORY INSUFFICIENCY: Status: ACTIVE | Noted: 2021-07-12

## 2021-07-12 PROCEDURE — 99232 SBSQ HOSP IP/OBS MODERATE 35: CPT | Performed by: INTERNAL MEDICINE

## 2021-07-12 PROCEDURE — 94640 AIRWAY INHALATION TREATMENT: CPT

## 2021-07-12 PROCEDURE — 94664 DEMO&/EVAL PT USE INHALER: CPT

## 2021-07-12 PROCEDURE — 94760 N-INVAS EAR/PLS OXIMETRY 1: CPT

## 2021-07-12 PROCEDURE — 71045 X-RAY EXAM CHEST 1 VIEW: CPT

## 2021-07-12 RX ADMIN — PRAVASTATIN SODIUM 40 MG: 40 TABLET ORAL at 16:50

## 2021-07-12 RX ADMIN — PANTOPRAZOLE SODIUM 40 MG: 40 TABLET, DELAYED RELEASE ORAL at 08:09

## 2021-07-12 RX ADMIN — Medication 1 PATCH: at 16:51

## 2021-07-12 RX ADMIN — VERAPAMIL HYDROCHLORIDE 240 MG: 240 TABLET ORAL at 08:11

## 2021-07-12 RX ADMIN — BACITRACIN 1 SMALL APPLICATION: 500 OINTMENT TOPICAL at 08:09

## 2021-07-12 RX ADMIN — ACETAMINOPHEN 650 MG: 325 TABLET, FILM COATED ORAL at 08:13

## 2021-07-12 RX ADMIN — IPRATROPIUM BROMIDE AND ALBUTEROL SULFATE 3 ML: 2.5; .5 SOLUTION RESPIRATORY (INHALATION) at 21:57

## 2021-07-12 RX ADMIN — BUPROPION HYDROCHLORIDE 300 MG: 150 TABLET, FILM COATED, EXTENDED RELEASE ORAL at 08:09

## 2021-07-12 RX ADMIN — IPRATROPIUM BROMIDE AND ALBUTEROL SULFATE 3 ML: 2.5; .5 SOLUTION RESPIRATORY (INHALATION) at 07:57

## 2021-07-12 RX ADMIN — BACITRACIN 1 SMALL APPLICATION: 500 OINTMENT TOPICAL at 17:02

## 2021-07-12 NOTE — PROGRESS NOTES
RN ambulated with patient around the unit  Patient's SpO2 did not dip below 93% while on room air  RT and SLIM Dr Franco Higgins made aware

## 2021-07-12 NOTE — PROGRESS NOTES
Attempted to visit patient and introduced self  Cultivated a relationship of care and support  Patient thanked for stopping by her room and did not need any support from pastoral care at this time       07/12/21 1100   Clinical Encounter Type   Visited With Patient   Routine Visit Introduction

## 2021-07-12 NOTE — ASSESSMENT & PLAN NOTE
Patient with hx of underlying moderate copd  No sign of exacerbation  Cont duonebs, on stiolto as outpatient  Home o2 eval prior to d/c

## 2021-07-12 NOTE — PROGRESS NOTES
[address] [facility]  Progress Note - Jaya Gilman 1960, 57 y o  female MRN: [id number]  Unit/Bed#: [facility] 504-01 Encounter: [id number]  Primary Care Provider: Mariah Guajardo MD   Date and time admitted to hospital: 7/9/2021 12:17 PM    * Postprocedural pneumothorax  Assessment & Plan  Patient sent from pulmonologist office after developed shortness of breath on exertion secondary to postprocedural pneumothorax from lung biopsy 3 days ago  Pulmonary following  S/p chest tube placement per IR  S/p CT removal, repeat cxr planned for tomorrow am if no pnx may be discharge per pulm  POx range of 90-92%, home o2 evaluation prior to discharge    Solitary pulmonary nodule  Assessment & Plan  Status post biopsy 3 days ago, brushing, cytology neg for malignancy  Per pulm to consider IR percutaneous bx    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)  Assessment & Plan  No sign of acute exacerbation  Cont home inhalers    Respiratory insufficiency  Assessment & Plan  Patient with hx of underlying moderate copd  No sign of exacerbation  Cont duonebs, on stiolto as outpatient  Home o2 eval prior to d/c    Continuous dependence on cigarette smoking  Assessment & Plan  Tobacco cessation counseling  Nicotine patch    Major depressive disorder, recurrent (HCC)  Assessment & Plan  Cont Wellbutrin    Coronary atherosclerosis  Assessment & Plan  Stable cont meds  Pt is cp free    VTE Pharmacologic Prophylaxis:   Pharmacologic: low risk  Mechanical VTE Prophylaxis in Place: Yes    Patient Centered Rounds: I have performed bedside rounds with nursing staff today  Discussions with Specialists or Other Care Team Provider:     Education and Discussions with Family / Patient: Patient and family    Time Spent for Care: 30 minutes  More than 50% of total time spent on counseling and coordination of care as described above      Current Length of Stay: 3 day(s)    Current Patient Status: Inpatient   Certification Statement: The patient will continue to require additional inpatient hospital stay due to S/p removal of ct, planned for repeat cxr tomorrow    Discharge Plan: D/c planning hopefully tomorrow    Code Status: Level 1 - Full Code      Subjective:   Pt seen earlier, off O2, pox in low 90's  Denies sob  CT clamped to be removed per IR tomorrow    Objective:     Vitals:   Temp (24hrs), Av 9 °F (36 6 °C), Min:97 6 °F (36 4 °C), Max:98 2 °F (36 8 °C)    Temp:  [97 6 °F (36 4 °C)-98 2 °F (36 8 °C)] 98 2 °F (36 8 °C)  HR:  [74-85] 85  Resp:  [18-21] 18  BP: (109-121)/(54-78) 121/75  SpO2:  [88 %-95 %] 91 %  Body mass index is 27 62 kg/m²  Input and Output Summary (last 24 hours): Intake/Output Summary (Last 24 hours) at 2021 1642  Last data filed at 2021 0813  Gross per 24 hour   Intake 300 ml   Output 0 ml   Net 300 ml       Physical Exam:     Physical Exam  Constitutional:       Appearance: Normal appearance  Cardiovascular:      Rate and Rhythm: Normal rate and regular rhythm  Pulses: Normal pulses  Heart sounds: Normal heart sounds  Pulmonary:      Effort: Pulmonary effort is normal  No respiratory distress  Breath sounds: Normal breath sounds  No wheezing or rales  Abdominal:      General: Abdomen is flat  Bowel sounds are normal  There is no distension  Palpations: Abdomen is soft  Tenderness: There is no abdominal tenderness  There is no guarding  Musculoskeletal:         General: Normal range of motion  Cervical back: Normal range of motion and neck supple  Skin:     General: Skin is warm and dry  Neurological:      General: No focal deficit present  Mental Status: She is alert and oriented to person, place, and time  Mental status is at baseline  Cranial Nerves: No cranial nerve deficit  Motor: No weakness             Additional Data:     Labs:    Results from last 7 days   Lab Units 07/10/21  0513 07/09/21  1319   WBC Thousand/uL 7 95 11 83*   HEMOGLOBIN g/dL 14 3 16 3*   HEMATOCRIT % 43 9 48 1*   PLATELETS Thousands/uL 161 181   NEUTROS PCT %  --  76*   LYMPHS PCT %  --  15   MONOS PCT %  --  7   EOS PCT %  --  2     Results from last 7 days   Lab Units 07/10/21  0513   SODIUM mmol/L 137   POTASSIUM mmol/L 3 9   CHLORIDE mmol/L 108   CO2 mmol/L 28   BUN mg/dL 14   CREATININE mg/dL 0 70   ANION GAP mmol/L 1*   CALCIUM mg/dL 8 8   GLUCOSE RANDOM mg/dL 82     Results from last 7 days   Lab Units 07/09/21  1319   INR  0 96                       * I Have Reviewed All Lab Data Listed Above  * Additional Pertinent Lab Tests Reviewed:  All Labs Within Last 24 Hours Reviewed    Imaging:    Imaging Reports Reviewed Today Include:   Imaging Personally Reviewed by Myself Includes:      Recent Cultures (last 7 days):     Results from last 7 days   Lab Units 07/06/21  0956   GRAM STAIN RESULT  Rare Polys  No organisms seen       Last 24 Hours Medication List:   Current Facility-Administered Medications   Medication Dose Route Frequency Provider Last Rate    acetaminophen  650 mg Oral Q6H PRN Bryan Carreno MD      albuterol  2 5 mg Nebulization Q4H PRN Bryan Carreno MD      bacitracin  1 small application Topical BID Amairani Gibbons DO      buPROPion  300 mg Oral QAM Bryan Carreno MD      cyclobenzaprine  5 mg Oral TID PRN Bryan Carreno MD      ipratropium-albuterol  3 mL Nebulization BID Malik Norris MD      nicotine  1 patch Transdermal Q24H Bryan Carreno MD      ondansetron  4 mg Intravenous Q6H PRN Bryan Carreno MD      oxyCODONE-acetaminophen  2 tablet Oral Q4H PRN Bryan Carreno MD      pantoprazole  40 mg Oral Daily Bryan Carreno MD      pravastatin  40 mg Oral Daily With Jessica Fernandez MD      verapamil  240 mg Oral Daily Bryan Carreno MD          Today, Patient Was Seen By: Heath Max DO    ** Please Note: Dictation voice to text software may have been used in the creation of this document   **

## 2021-07-12 NOTE — ASSESSMENT & PLAN NOTE
Patient sent from pulmonologist office after developed shortness of breath on exertion secondary to postprocedural pneumothorax from lung biopsy 3 days ago    Pulmonary following  S/p chest tube placement per IR  S/p CT removal, repeat cxr planned for tomorrow am if no pnx may be discharge per pulm  POx range of 90-92%, home o2 evaluation prior to discharge

## 2021-07-12 NOTE — PROGRESS NOTES
Progress Note - Pulmonary   Natalia Horn 57 y o  female MRN: [id number]  Unit/Bed#: [facility] 504-01 Encounter: [id number]      Assessment and Plan:     Large left pneumothorax  - following navigation bronchoscopy on 07/06 with worsening since then s/p left chest tube placement  - clamped since yesterday afternoon and CXR shows persistent re-expansion   - will discuss with IR to remove chest tube today and monitor x 24 hours     Left upper lobe subcentimeter solitary pulmonary nodule  - has showed interval growth since 2018  S/p Tye Bronch  Pathology and cytology nondiagnostic  - will need definitive follow up     Moderate COPD w/o exacerbation  - was on [place] as an outpatient but switched to stiolto on 06/30 due to thrush and was treated with nystatin for 5 days  - on duoneb BID and albuterol prn here     Chronic tobacco abuse  - recently quit smoking  On wellbutryn and nicotine patch     Subjective:   Patient see/examined this AM  She still had some mild pleuritic pain in the left chest, still on oxygen  Review of Systems   All other systems reviewed and are negative  Objective:     Vitals:    07/11/21 1919 07/11/21 2209 07/12/21 0758 07/12/21 0811   BP:  109/54  117/78   BP Location:  Right arm  Right arm   Pulse:  84  74   Resp:  18  21   Temp:  98 °F (36 7 °C)  97 6 °F (36 4 °C)   TempSrc:  Oral  Oral   SpO2: 91% 95% (!) 88% 94%   Weight:       Height:               Intake/Output Summary (Last 24 hours) at 7/12/2021 0951  Last data filed at 7/12/2021 0813  Gross per 24 hour   Intake 480 ml   Output 0 ml   Net 480 ml         Physical Exam  Vitals reviewed  Constitutional:       Appearance: Normal appearance  HENT:      Head: Normocephalic  Nose: Nose normal       Mouth/Throat:      Mouth: Mucous membranes are moist    Eyes:      Pupils: Pupils are equal, round, and reactive to light  Cardiovascular:      Rate and Rhythm: Normal rate and regular rhythm  Pulses: Normal pulses        Heart sounds: Normal heart sounds  Pulmonary:      Effort: Pulmonary effort is normal       Breath sounds: Normal breath sounds  Abdominal:      General: Abdomen is flat  Palpations: Abdomen is soft  Musculoskeletal:         General: Normal range of motion  Skin:     General: Skin is warm and dry  Neurological:      General: No focal deficit present  Mental Status: She is alert and oriented to person, place, and time  Labs: I have personally reviewed pertinent lab results  Results from last 7 days   Lab Units 07/10/21  0513 07/09/21  1319   WBC Thousand/uL 7 95 11 83*   HEMOGLOBIN g/dL 14 3 16 3*   HEMATOCRIT % 43 9 48 1*   PLATELETS Thousands/uL 161 181   NEUTROS PCT %  --  76*   MONOS PCT %  --  7      Results from last 7 days   Lab Units 07/10/21  0513 07/09/21  1319   POTASSIUM mmol/L 3 9 3 5   CHLORIDE mmol/L 108 108   CO2 mmol/L 28 25   BUN mg/dL 14 10   CREATININE mg/dL 0 70 0 70   CALCIUM mg/dL 8 8 9 3              Results from last 7 days   Lab Units 07/09/21  1319 07/06/21  0712   INR  0 96 0 96   PTT seconds 25  --          0   Lab Value Date/Time    TROPONINI <0 02 07/09/2021 1319       Microbiology:  None     Imaging and other studies: I have personally reviewed pertinent reports     and I have personally reviewed pertinent films in PACS        Francesca iLn MD   Pulmonary & Critical Care Fellow  [facility]

## 2021-07-13 ENCOUNTER — APPOINTMENT (INPATIENT)
Dept: RADIOLOGY | Facility: HOSPITAL | Age: 61
DRG: 200 | End: 2021-07-13
Payer: COMMERCIAL

## 2021-07-13 VITALS
HEIGHT: 62 IN | TEMPERATURE: 98.1 F | SYSTOLIC BLOOD PRESSURE: 126 MMHG | BODY MASS INDEX: 27.79 KG/M2 | DIASTOLIC BLOOD PRESSURE: 73 MMHG | WEIGHT: 151 LBS | RESPIRATION RATE: 16 BRPM | HEART RATE: 84 BPM | OXYGEN SATURATION: 94 %

## 2021-07-13 PROCEDURE — 99239 HOSP IP/OBS DSCHRG MGMT >30: CPT | Performed by: INTERNAL MEDICINE

## 2021-07-13 PROCEDURE — 94760 N-INVAS EAR/PLS OXIMETRY 1: CPT

## 2021-07-13 PROCEDURE — 71045 X-RAY EXAM CHEST 1 VIEW: CPT

## 2021-07-13 PROCEDURE — 94640 AIRWAY INHALATION TREATMENT: CPT

## 2021-07-13 PROCEDURE — 99231 SBSQ HOSP IP/OBS SF/LOW 25: CPT | Performed by: INTERNAL MEDICINE

## 2021-07-13 RX ORDER — CYCLOBENZAPRINE HCL 5 MG
5 TABLET ORAL 3 TIMES DAILY PRN
Qty: 20 TABLET | Refills: 0 | Status: SHIPPED | OUTPATIENT
Start: 2021-07-13 | End: 2021-11-01

## 2021-07-13 RX ADMIN — IPRATROPIUM BROMIDE AND ALBUTEROL SULFATE 3 ML: 2.5; .5 SOLUTION RESPIRATORY (INHALATION) at 08:09

## 2021-07-13 RX ADMIN — BACITRACIN 1 SMALL APPLICATION: 500 OINTMENT TOPICAL at 08:17

## 2021-07-13 RX ADMIN — VERAPAMIL HYDROCHLORIDE 240 MG: 240 TABLET ORAL at 08:17

## 2021-07-13 RX ADMIN — BUPROPION HYDROCHLORIDE 300 MG: 150 TABLET, FILM COATED, EXTENDED RELEASE ORAL at 08:17

## 2021-07-13 RX ADMIN — PANTOPRAZOLE SODIUM 40 MG: 40 TABLET, DELAYED RELEASE ORAL at 08:17

## 2021-07-13 NOTE — ED ATTENDING ATTESTATION
7/9/2021  IShani DO, saw and evaluated the patient  I have discussed the patient with the resident/non-physician practitioner and agree with the resident's/non-physician practitioner's findings, Plan of Care, and MDM as documented in the resident's/non-physician practitioner's note, except where noted  All available labs and Radiology studies were reviewed  I was present for key portions of any procedure(s) performed by the resident/non-physician practitioner and I was immediately available to provide assistance  At this point I agree with the current assessment done in the Emergency Department  I have conducted an independent evaluation of this patient a history and physical is as follows:    69-year-old female presents from Pulmonary office for planned chest tube for pneumothorax  Patient had biopsy of left lung nodule on Tuesday and falling procedure had small pneumothorax that was being monitored by Pulmonary  The last 2 days patient has been sore the biopsy site and has had profound shortness of breath with exertion  Had chest x-ray done that showed larger pneumothorax and was referred to emergency department for placement of chest tube by Interventional Radiology  Patient denies other complaints  No fevers  On exam-no acute distress, heart regular, no respiratory distress  Plan-discussed with Pulmonary and they request IR for chest tube placement    Plan to admit to Medicine    ED Course         Critical Care Time  Procedures

## 2021-07-13 NOTE — PROGRESS NOTES
Progress Note - Pulmonary   Verena Hakeem Horn 57 y o  female MRN: [id number]  Unit/Bed#: [facility] 504-01 Encounter: [id number]      Assessment and Plan:     Large left pneumothorax  - following navigation bronchoscopy on 07/06 with worsening since then s/p left chest tube placement  - IR removed chest tube 7/12/2021  - repeat CXR 7/13 shows no PTX    - hospital follow up appointment made for [facility]    Left upper lobe subcentimeter solitary pulmonary nodule  - has showed interval growth since 2018  S/p Tye Bronch  Pathology and cytology nondiagnostic  - will need definitive follow up     Moderate COPD w/o exacerbation  - was on Lupie Epperson as an outpatient but switched to stiolto on 06/30 due to thrush and was treated with nystatin for 5 days  - on duoneb BID and albuterol prn here     Chronic tobacco abuse  - recently quit smoking  On wellbutryn and nicotine patch    Okay to discharge home today, strongly advised on smoking cessation  Appointment made for August 5th at 840AM at [facility]  Discussed with primary team       Subjective:   Patient see/examined this AM  She has no pain, no SOB  She walked the unit without any desaturation  Review of Systems   All other systems reviewed and are negative  Objective:     Vitals:    07/12/21 1430 07/12/21 1519 07/12/21 2157 07/12/21 2223   BP:  121/75  125/71   BP Location:  Right arm  Right arm   Pulse:  85 69 78   Resp:  18 18 16   Temp:  98 2 °F (36 8 °C)  97 9 °F (36 6 °C)   TempSrc:  Oral  Oral   SpO2: 93% 91% 92% 93%   Weight:       Height:               Intake/Output Summary (Last 24 hours) at 7/13/2021 0727  Last data filed at 7/12/2021 0813  Gross per 24 hour   Intake 180 ml   Output --   Net 180 ml         Physical Exam  Vitals reviewed  Constitutional:       Appearance: Normal appearance  HENT:      Head: Normocephalic        Nose: Nose normal       Mouth/Throat:      Mouth: Mucous membranes are moist    Eyes:      Pupils: Pupils are equal, round, and reactive to light  Cardiovascular:      Rate and Rhythm: Normal rate and regular rhythm  Pulses: Normal pulses  Heart sounds: Normal heart sounds  Pulmonary:      Effort: Pulmonary effort is normal       Breath sounds: Normal breath sounds  Abdominal:      General: Abdomen is flat  Palpations: Abdomen is soft  Musculoskeletal:         General: Normal range of motion  Skin:     General: Skin is warm and dry  Neurological:      General: No focal deficit present  Mental Status: She is alert and oriented to person, place, and time  Labs: I have personally reviewed pertinent lab results  Results from last 7 days   Lab Units 07/10/21  0513 07/09/21  1319   WBC Thousand/uL 7 95 11 83*   HEMOGLOBIN g/dL 14 3 16 3*   HEMATOCRIT % 43 9 48 1*   PLATELETS Thousands/uL 161 181   NEUTROS PCT %  --  76*   MONOS PCT %  --  7      Results from last 7 days   Lab Units 07/10/21  0513 07/09/21  1319   POTASSIUM mmol/L 3 9 3 5   CHLORIDE mmol/L 108 108   CO2 mmol/L 28 25   BUN mg/dL 14 10   CREATININE mg/dL 0 70 0 70   CALCIUM mg/dL 8 8 9 3              Results from last 7 days   Lab Units 07/09/21  1319   INR  0 96   PTT seconds 25         0   Lab Value Date/Time    TROPONINI <0 02 07/09/2021 1319       Microbiology:  None     Imaging and other studies: I have personally reviewed pertinent reports     and I have personally reviewed pertinent films in PACS        Viviana Brand MD   Pulmonary & Critical Care Fellow  [facility]

## 2021-07-14 ENCOUNTER — TRANSITIONAL CARE MANAGEMENT (OUTPATIENT)
Dept: FAMILY MEDICINE CLINIC | Facility: CLINIC | Age: 61
End: 2021-07-14

## 2021-07-14 ENCOUNTER — APPOINTMENT (OUTPATIENT)
Dept: PULMONOLOGY | Facility: CLINIC | Age: 61
End: 2021-07-14
Payer: COMMERCIAL

## 2021-07-14 NOTE — DISCHARGE SUMMARY
[facility]  Discharge- Chapin Guerra 1960, 57 y o  female MRN: [id number]  Unit/Bed#: [facility] 504-01 Encounter: [id number]  Primary Care Provider: Rashad Oneill MD   Date and time admitted to hospital: 7/9/2021 12:17 PM    Respiratory insufficiency  Assessment & Plan  Patient with hx of underlying moderate copd  No sign of exacerbation  Cont duonebs, on stiolto as outpatient  Home o2 eval prior to d/c    Continuous dependence on cigarette smoking  Assessment & Plan  Tobacco cessation counseling  Nicotine patch    Solitary pulmonary nodule  Assessment & Plan  Status post biopsy 3 days ago, brushing, cytology neg for malignancy  Per pulm to consider IR percutaneous bx    Major depressive disorder, recurrent (HCC)  Assessment & Plan  Cont Wellbutrin    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  ([address] )  Assessment & Plan  No sign of acute exacerbation  Cont home inhalers    Coronary atherosclerosis  Assessment & Plan  Stable cont meds  Pt is cp free    * Postprocedural pneumothorax  Assessment & Plan  Patient sent from pulmonologist office after developed shortness of breath on exertion secondary to postprocedural pneumothorax from lung biopsy 3 days ago    Pulmonary following  S/p chest tube placement per IR  S/p CT removal, repeat cxr am : no pnx may be discharge per pulm  POx range of 90-92%, home o2 evaluation prior to discharge did not require           Medical Problems     Resolved Problems  Date Reviewed: 7/12/2021    None              Discharging Physician / Practitioner: Giuseppe Quintana MD  PCP: Rashad Oneill MD  Admission Date:   Admission Orders (From admission, onward)     Ordered        07/09/21 1447  Inpatient Admission  Once                   Discharge Date: 07/13/21  Consultations During Hospital Stay:  ·  pulmonary     Procedures Performed:   ·  CXR: 7/9 large pneumothorax,   · CXR: chest tube placement  · CXR: 7/10: resolution of pneumothorax  · CXR: left pigtail no effusion or pneumo  · CXR: 7/10Pigtail catheter slightly retracted from the prior study though remains in appropriate position  · CXR: 7/11: No evidence of left pneumothorax  Stable pigtail drainage catheter  · CXR: 7/11: No pneumothorax is identified  Trace right pleural effusion and associated atelectasis    Mild diffuse interstitial prominence may relate to edema  · Incentive spirometry  · Chest tube for pneumothorax insertion and removal   · CXR: 7/12: Left pigtail removal with no pneumothorax    Persistent trace right effusion and right base atelectasis  Emphysema  Significant Findings / Test Results:   · As above    Incidental Findings:   · Pulmonary nodule follow up with pulmonary     Test Results Pending at Discharge (will require follow up):   ·  cytology, fungal cultures      Outpatient Tests Requested:  · None     Complications:  None     Reason for Admission: pneumothorax    Hospital Course:   Hiram Ortiz is a 57 y o  female patient who originally presented to the hospital on 7/9/2021 due to pneumothorax after biopsy with bronch on 7/9  Pulmonary was following closely and chest tube inserted by IR  Resolution of pneumothorax, s/p removal of chest tube  Needs follow up with pulmonary  Cytology, fungal cultures pending  Please see above list of diagnoses and related plan for additional information  Condition at Discharge: stable    Discharge Day Visit / Exam:   * Please refer to separate progress note for these details *    Discussion with Family: Patient declined call to   Discharge instructions/Information to patient and family:   See after visit summary for information provided to patient and family  Provisions for Follow-Up Care:  See after visit summary for information related to follow-up care and any pertinent home health orders         Disposition:   Home    Planned Readmission: no      Discharge Statement:  I spent 45 minutes discharging the patient  This time was spent on the day of discharge  I had direct contact with the patient on the day of discharge  Greater than 50% of the total time was spent examining patient, answering all patient questions, arranging and discussing plan of care with patient as well as directly providing post-discharge instructions  Additional time then spent on discharge activities  Discharge Medications:  See after visit summary for reconciled discharge medications provided to patient and/or family        **Please Note: This note may have been constructed using a voice recognition system**

## 2021-07-14 NOTE — UTILIZATION REVIEW
Notification of Discharge   This is a Notification of Discharge from our facility [address]  Please be advised that this patient has been discharge from our facility  Below you will find the admission and discharge date and time including the patients disposition  UTILIZATION REVIEW CONTACT:  Leela Morel  Utilization   Network Utilization Review Department  Phone: [phone] x carefully listen to the prompts  All voicemails are confidential   Email: [email or web address]     PHYSICIAN ADVISORY SERVICES:  FOR [id number] REVIEW - MEDICAL NECESSITY DENIAL  Phone: [phone]  Fax: [phone]  Email: [email or web address]     PRESENTATION DATE: 7/9/2021 12:17 PM  OBERVATION ADMISSION DATE:   INPATIENT ADMISSION DATE: 7/9/21  2:47 PM   DISCHARGE DATE: 7/13/2021 12:16 PM  DISPOSITION: Home/Self Care Home/Self Care      IMPORTANT INFORMATION:  Send all requests for admission clinical reviews, approved or denied determinations and any other requests to dedicated fax number below belonging to the campus where the patient is receiving treatment   List of dedicated fax numbers:  [address] DENIALS (Administrative/Medical Necessity) [phone]   [address] [place] [address] (Maternity/NICU/Pediatrics) [phone]   Srinivasa Carrillo [phone]   Nadir Jones [phone]   Yeison Negro [phone]   [facility] [address] [facility] [phone]   [facility]  [phone]   [address] [facility], [facility]  [address] [facility] [address] [facility] [phone]

## 2021-07-15 ENCOUNTER — OFFICE VISIT (OUTPATIENT)
Dept: FAMILY MEDICINE CLINIC | Facility: CLINIC | Age: 61
End: 2021-07-15
Payer: COMMERCIAL

## 2021-07-15 VITALS
OXYGEN SATURATION: 96 % | SYSTOLIC BLOOD PRESSURE: 122 MMHG | BODY MASS INDEX: 27.79 KG/M2 | WEIGHT: 151 LBS | TEMPERATURE: 97.8 F | HEART RATE: 74 BPM | DIASTOLIC BLOOD PRESSURE: 70 MMHG | HEIGHT: 62 IN | RESPIRATION RATE: 18 BRPM

## 2021-07-15 DIAGNOSIS — R06.89 RESPIRATORY INSUFFICIENCY: ICD-10-CM

## 2021-07-15 DIAGNOSIS — F17.210 CONTINUOUS DEPENDENCE ON CIGARETTE SMOKING: ICD-10-CM

## 2021-07-15 DIAGNOSIS — I25.10 ATHEROSCLEROSIS OF CORONARY ARTERY OF NATIVE HEART WITHOUT ANGINA PECTORIS, UNSPECIFIED VESSEL OR LESION TYPE: Chronic | ICD-10-CM

## 2021-07-15 DIAGNOSIS — J44.9 MODERATE COPD (CHRONIC OBSTRUCTIVE PULMONARY DISEASE) (HCC): ICD-10-CM

## 2021-07-15 DIAGNOSIS — E78.5 HYPERLIPIDEMIA, UNSPECIFIED HYPERLIPIDEMIA TYPE: ICD-10-CM

## 2021-07-15 DIAGNOSIS — R91.1 SOLITARY PULMONARY NODULE: ICD-10-CM

## 2021-07-15 DIAGNOSIS — J95.811 POSTPROCEDURAL PNEUMOTHORAX: Primary | ICD-10-CM

## 2021-07-15 PROCEDURE — 3008F BODY MASS INDEX DOCD: CPT | Performed by: INTERNAL MEDICINE

## 2021-07-15 PROCEDURE — 99496 TRANSJ CARE MGMT HIGH F2F 7D: CPT | Performed by: FAMILY MEDICINE

## 2021-07-15 PROCEDURE — 1111F DSCHRG MED/CURRENT MED MERGE: CPT | Performed by: FAMILY MEDICINE

## 2021-07-15 RX ORDER — TIOTROPIUM BROMIDE INHALATION SPRAY 3.12 UG/1
2 SPRAY, METERED RESPIRATORY (INHALATION) DAILY
COMMUNITY
Start: 2021-06-30 | End: 2021-08-25 | Stop reason: SDUPTHER

## 2021-07-15 NOTE — PROGRESS NOTES
FAMILY PRACTICE OFFICE VISIT       NAME: Miriam Ochoa  AGE: 57 y o  SEX: female       : 1960        MRN: [id number]        Assessment and Plan     1  Postprocedural pneumothorax  Assessment & Plan:  Status post chest tube placement and removal     Patient reports significant improvement of shortness of breath upon discharge  2  Respiratory insufficiency  Assessment & Plan:  Pulse ox is normal on room air  Patient reports significant improvement of dyspnea upon discharge      3  Continuous dependence on cigarette smoking  Assessment & Plan:  Smoke free for 10 days  Continue Wellbutrin  mg once a day and nicotine replacement patches  Patient denies cravings      4  Hyperlipidemia, unspecified hyperlipidemia type  Assessment & Plan:  Continue Crestor  Patient reports intermittent symptoms of hand and feet cramping, she reports improvement with over-the-counter potassium supplements since  She will try magnesium oxide and tonic water  She will contact me if symptoms persist      5  Atherosclerosis of coronary artery of native heart without angina pectoris, unspecified vessel or lesion type  Assessment & Plan:  Continue verapamil, aspirin and Crestor      6  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  ([address] )  Assessment & Plan:  Patient remains under care of [facility]'s pulmonology  She has been smoke-free within past 10 days  Patient is completing course of Advair and will be switching to Spiriva as per pulmonology advise  Patient reports significant improvement of chronic dyspnea and cough after tobacco cessation          7  Solitary pulmonary nodule  Assessment & Plan:  8 mm left upper low pulmonary nodule   Status post biopsy, results are benign  Pulmonology recommends ongoing imaging surveillance  Patient presents for follow-up after recent hospitalization due to postprocedure pneumothorax  She has successfully quit tobacco 10 days ago  Patient reports significant improvement of dyspnea  She remains under ongoing care of [facility] pulmonology  I advised her to advance activities as tolerated and contact myself or her pulmonologist immediately with any changes of symptoms  There are no Patient Instructions on file for this visit  No follow-ups on file  Discussed with the patient and all questioned fully answered  She will call me if any problems arise  M*Modal software was used to dictate this note  It may contain errors with dictating incorrect words/spelling  Please contact provider directly with any questions  Chief Complaint     Chief Complaint   Patient presents with    Transition of Care Management     Postprocedural Pneumothorax        History of Present Illness     TCM Call (since 6/14/2021)     Date and time call was made  7/14/2021  9:37 AM    Hospital care reviewed  Records reviewed    Patient was hospitialized at  [facility]        Date of Admission  07/09/21    Date of discharge  07/13/21    Diagnosis   Postprocedural Pneumothorax     Disposition  Home    Were the patients medications reviewed and updated  No    Current Symptoms  None      TCM Call (since 6/14/2021)     Post hospital issues  Reduced activity    Should patient be enrolled in anticoag monitoring? No    Scheduled for follow up?   Yes    Did you obtain your prescribed medications  Yes    Do you need help managing your prescriptions or medications  No    Is transportation to your appointment needed  No    I have advised the patient to call PCP with any new or worsening symptoms    Ford Leal CMA    Are you recieving any outpatient services  No    Are you recieving home care services  No    Interperter language line needed  No    Counseling  Patient    Counseling topics  Importance of RX compliance    Comments  Apt scheduled for 7/15th at 10 am        Patient presents for follow-up after recent hospitalization for due to postprocedure pneumothorax  Patient has recently underwent bronchoscopy for evaluation of lung nodule  Biopsy is benign  Patient had follow-up conversation with her pulmonologist who recommended continuing imaging surveillance  Patient has been smoke-free within past 10 days  She uses nicotine replacement patch, 21 mg daily  She denies any cravings  Currently patient feels well and offers no complaints of cough, shortness of breath, fever or mucus expectoration  She is completing course of Advair and will be switching to Spiriva as per her pulmonologist, Dr Samson Pickering  Patient is scheduled for follow-up with pulmonology in August    Patient remains on the same daily medications for treatment of hypertension, hyperlipidemia, acid reflux and depression  She denies symptoms of chest pain, palpitations, shortness of breath or dizziness  She is still feeling fatigued but reports significant improvement of symptoms overall  Patient has been experiencing intermittent cramping in her hands and feet at night and occasionally daytime  She started using over-the-counter potassium supplements and reports significant improvement of symptoms  Review of Systems   Review of Systems   Constitutional: Negative  HENT: Negative  Eyes: Negative  Respiratory: Negative  Cardiovascular: Negative  Gastrointestinal: Negative  Endocrine: Negative  Musculoskeletal: Positive for arthralgias  Skin: Negative  Allergic/Immunologic: Negative  Neurological: Negative  Hematological: Negative  Psychiatric/Behavioral: Negative          Active Problem List     Patient Active Problem List   Diagnosis    Coronary atherosclerosis    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  ([facility] )    DDD (degenerative disc disease), lumbar    Major depressive disorder, recurrent [facility]    Esophageal reflux    Gallbladder polyp    Hyperlipidemia    Low back pain    Myofascial pain syndrome, cervical    Osteopenia    Solitary pulmonary nodule    Vitamin D deficiency    Hoarseness    Vocal cord leukoplakia    Sensorineural hearing loss (SNHL), bilateral    Oropharyngeal dysphagia    Suri's edema of vocal folds    Thrush of mouth and esophagus (HCC)    Continuous dependence on cigarette smoking    Postprocedural pneumothorax    Respiratory insufficiency       Past Medical History:  Past Medical History:   Diagnosis Date    COPD (chronic obstructive pulmonary disease) (HCC)     GERD (gastroesophageal reflux disease)     Hypercholesterolemia     Iliotibial band tendonitis     Last Assessed: 6/5/2015    Migraine     Prinzmetal angina ([address] )     Last Assessed: 5/23/2017    Sciatica     Last Assessed: 11/10/2014    Tobacco abuse 4/19/2018    Vertigo     Last Assessed: 4/12/2017       Past Surgical History:  Past Surgical History:   Procedure Laterality Date    COLONOSCOPY  06/2011    Complete: Dr Tamie White - due in 5 years, Colonoscopy Nov 2017, Diverticulosis, 5 mm polyp, Due in 5 years    ESOPHAGOGASTRODUODENOSCOPY      Diagnostic; Last Assessed: 7/11/2014    FLEXOR TENDON REPAIR      left finger    HYSTERECTOMY      @ agr 28    IR CHEST TUBE PLACEMENT  7/9/2021    WI LARYNGOSCOPY,DIRECT,DIAGNOSTIC N/A 5/16/2019    Procedure: LARYNGOSCOPY DIRECT, FLEXIBLE BRONCHOSCOPY, FLEXIBLE ESOPHAGOSCOPY;  Surgeon: Loida Nelson MD;  Location: [facility];  Service: ENT    SHOULDER ARTHROSCOPY Right     SHOULDER SURGERY      TOTAL ABDOMINAL HYSTERECTOMY W/ BILATERAL SALPINGOOPHORECTOMY      endometriosis;  Last Assessed: 5/4/2015       Family History:  Family History   Problem Relation Age of Onset    Lung cancer Mother 47    Other Father         Dyslipidemia    Diabetes Sister     Hypertension Family     Ovarian cancer Maternal Grandmother 48    No Known Problems Daughter     No Known Problems Maternal Grandfather     Lung cancer Paternal Grandmother  No Known Problems Paternal Grandfather     No Known Problems Sister     No Known Problems Sister     No Known Problems Daughter     No Known Problems Maternal Aunt     No Known Problems Paternal Aunt     No Known Problems Paternal Aunt     No Known Problems Paternal Aunt        Social History:  Social History     Socioeconomic History    Marital status: /Civil Union     Spouse name: Not on file    Number of children: Not on file    Years of education: Not on file    Highest education level: Not on file   Occupational History    Not on file   Tobacco Use    Smoking status: Former Smoker     Packs/day: 0 50     Years: 48 00     Pack years: 24 00     Types: Cigarettes     Start date:      Quit date: 2021     Years since quittin 0    Smokeless tobacco: Never Used   Vaping Use    Vaping Use: Former    Quit date: 2021   Substance and Sexual Activity    Alcohol use: No    Drug use: No    Sexual activity: Not on file   Other Topics Concern    Not on file   Social History Narrative    Working full time     Social Determinants of Health     Financial Resource Strain:     Difficulty of Paying Living Expenses:    Food Insecurity:     Worried About Running Out of Food in the Last Year:     [address] in the Last Year:    Transportation Needs:     Lack of Transportation (Medical):      Lack of Transportation (Non-Medical):    Physical Activity:     Days of Exercise per Week:     Minutes of Exercise per Session:    Stress:     Feeling of Stress :    Social Connections:     Frequency of Communication with Friends and Family:     Frequency of Social Gatherings with Friends and Family:     Attends [employer]ism Services:     Active Member of Clubs or Organizations:     Attends Club or Organization Meetings:     Marital Status:    Intimate Partner Violence:     Fear of Current or Ex-Partner:     Emotionally Abused:     Physically Abused:     Sexually Abused: Objective     Vitals:    07/15/21 0955   BP: 122/70   BP Location: Right arm   Patient Position: Sitting   Cuff Size: Standard   Pulse: 74   Resp: 18   Temp: 97 8 °F (36 6 °C)   TempSrc: Temporal   SpO2: 96%   Weight: 68 5 kg (151 lb)   Height: 5' 2" (1 575 m)       Wt Readings from Last 3 Encounters:   07/15/21 68 5 kg (151 lb)   07/09/21 68 5 kg (151 lb)   07/06/21 68 5 kg (151 lb)       Physical Exam  Vitals and nursing note reviewed  Constitutional:       General: She is not in acute distress  Appearance: Normal appearance  She is well-developed  HENT:      Head: Normocephalic and atraumatic  Eyes:      Conjunctiva/sclera: Conjunctivae normal    Neck:      Thyroid: No thyromegaly  Vascular: No carotid bruit  Cardiovascular:      Rate and Rhythm: Normal rate and regular rhythm  Heart sounds: Normal heart sounds  No murmur heard  Pulmonary:      Effort: Pulmonary effort is normal  No respiratory distress  Breath sounds: Normal breath sounds  No wheezing  Abdominal:      General: There is no abdominal bruit  Musculoskeletal:         General: Normal range of motion  Cervical back: Neck supple  Right lower leg: No edema  Left lower leg: No edema  Skin:     General: Skin is warm  Neurological:      General: No focal deficit present  Mental Status: She is alert and oriented to person, place, and time  Cranial Nerves: No cranial nerve deficit  Coordination: Coordination normal    Psychiatric:         Mood and Affect: Mood normal          Behavior: Behavior normal          Thought Content:  Thought content normal           Pertinent Laboratory/Diagnostic Studies:    Lab Results   Component Value Date    WBC 7 95 07/10/2021    HGB 14 3 07/10/2021    HCT 43 9 07/10/2021    MCV 92 07/10/2021     07/10/2021       Lab Results   Component Value Date    TSH 2 25 09/17/2020       Lab Results   Component Value Date    CHOL 163 10/31/2017     Lab Results   Component Value Date    TRIG 55 04/20/2021     Lab Results   Component Value Date    HDL 54 04/20/2021     Lab Results   Component Value Date    LDLCALC 76 04/20/2021     Lab Results   Component Value Date    HGBA1C 5 6 04/20/2021     Lab Results   Component Value Date    SODIUM 137 07/10/2021    K 3 9 07/10/2021     07/10/2021    CO2 28 07/10/2021    AGAP 1 (L) 07/10/2021    BUN 14 07/10/2021    CREATININE 0 70 07/10/2021    GLUC 82 07/10/2021    GLUF 105 (H) 04/20/2021    CALCIUM 8 8 07/10/2021    AST 17 04/20/2021    ALT 32 04/20/2021    ALKPHOS 103 04/20/2021    PROT 7 0 01/18/2018    TP 7 1 04/20/2021    BILITOT 0 4 01/18/2018    TBILI 0 38 04/20/2021    EGFR 93 07/10/2021       No orders of the defined types were placed in this encounter        ALLERGIES:  Allergies   Allergen Reactions    Darvon [Propoxyphene] Itching       Current Medications     Current Outpatient Medications   Medication Sig Dispense Refill    albuterol (2 5 mg/3 mL) 0 083 % nebulizer solution Take 1 vial (2 5 mg total) by nebulization every 4 (four) hours as needed for wheezing or shortness of breath 120 vial 3    Albuterol Sulfate (ProAir RespiClick) 047 (90 Base) MCG/ACT AEPB Two puffs every 4-6 hours as needed for cough/wheeze/shortness of breath 1 each 5    aspirin (ECOTRIN LOW STRENGTH) 81 mg EC tablet Take 1 tablet by mouth daily      buPROPion (WELLBUTRIN XL) 300 mg 24 hr tablet Take 1 tablet (300 mg total) by mouth every morning 90 tablet 3    cyclobenzaprine (FLEXERIL) 5 mg tablet Take 1 tablet (5 mg total) by mouth 3 (three) times a day as needed for muscle spasms 20 tablet 0    nicotine (NICODERM CQ) 14 mg/24hr TD 24 hr patch Place 1 patch on the skin every 24 hours 28 patch 0    nitroglycerin (Nitrostat) 0 4 mg SL tablet Place 1 tablet (0 4 mg total) under the tongue every 5 (five) minutes as needed for chest pain 30 tablet 1    nystatin (MYCOSTATIN) 500,000 units/5 mL suspension Apply 5 mL (500,000 Units total) to the mouth or throat 4 (four) times a day for 5 days 100 mL 0    pantoprazole (PROTONIX) 40 mg tablet TAKE 1 TABLET BY MOUTH EVERY DAY 90 tablet 3    POTASSIUM CHLORIDE PO Take 1 caplet by mouth daily      rosuvastatin (CRESTOR) 20 MG tablet TAKE 1 TABLET DAILY 90 tablet 3    verapamil (CALAN-SR) 240 mg CR tablet TAKE 1 TABLET DAILY 90 tablet 3    Spiriva Respimat 2 5 MCG/ACT AERS inhaler Inhale 2 puffs daily (Patient not taking: Reported on 7/15/2021)       No current facility-administered medications for this visit         Medications Discontinued During This Encounter   Medication Reason    famotidine (PEPCID) 40 MG tablet Alternate therapy    ibuprofen (MOTRIN) 600 mg tablet     tiotropium-olodaterol (Stiolto Respimat) 2 5-2 5 MCG/ACT inhaler        Health Maintenance     Health Maintenance   Topic Date Due    HIV Screening  Never done    DTaP,Tdap,and Td Vaccines (1 - Tdap) Never done    Annual Physical  05/19/2021    Influenza Vaccine (1) 09/01/2021    COVID-19 Vaccine (1) 10/15/2021 (Originally 5/17/1972)    Depression Remission PHQ  10/26/2021    BMI: Followup Plan  04/26/2022    Lung Cancer Screening  06/19/2022    BMI: Adult  07/15/2022    Colorectal Cancer Screening  11/30/2022    Breast Cancer Screening: Mammogram  01/26/2023    Pneumococcal Vaccine: Pediatrics (0 to 5 Years) and At-Risk Patients (6 to 59 Years) (2 of 2 - PPSV23) 05/17/2024    Hepatitis C Screening  Completed    HIB Vaccine  Aged Out    Hepatitis B Vaccine  Aged Out    IPV Vaccine  Aged Out    Hepatitis A Vaccine  Aged Out    Meningococcal ACWY Vaccine  Aged Out    HPV Vaccine  Aged [employer] History   Administered Date(s) Administered    INFLUENZA 08/20/2015, 11/02/2017    Influenza Quadrivalent Preservative Free 3 years and older IM 08/17/2016, 11/02/2017    Influenza, recombinant, quadrivalent,injectable, preservative free 10/30/2018, 10/02/2019, 10/26/2020    Influenza, seasonal, injectable 1960, 09/01/2011, 01/24/2013    Pneumococcal Polysaccharide PPV23 11/15/2006, 11/02/2017       Shelle Halsted, MD

## 2021-07-15 NOTE — ASSESSMENT & PLAN NOTE
Smoke free for 10 days  Continue Wellbutrin  mg once a day and nicotine replacement patches    Patient denies cravings

## 2021-07-15 NOTE — ASSESSMENT & PLAN NOTE
Continue Crestor    Patient reports intermittent symptoms of hand and feet cramping, she reports improvement with over-the-counter potassium supplements since  She will try magnesium oxide and tonic water  She will contact me if symptoms persist

## 2021-07-19 ENCOUNTER — CLINICAL SUPPORT (OUTPATIENT)
Dept: PULMONOLOGY | Facility: CLINIC | Age: 61
End: 2021-07-19
Payer: COMMERCIAL

## 2021-07-19 DIAGNOSIS — J44.9 MODERATE COPD (CHRONIC OBSTRUCTIVE PULMONARY DISEASE) (HCC): ICD-10-CM

## 2021-07-19 PROCEDURE — G0424 PULMONARY REHAB W EXER: HCPCS

## 2021-07-19 NOTE — ASSESSMENT & PLAN NOTE
8 mm left upper low pulmonary nodule   Status post biopsy, results are benign  Pulmonology recommends ongoing imaging surveillance

## 2021-07-19 NOTE — ASSESSMENT & PLAN NOTE
Pulse ox is normal on room air      Patient reports significant improvement of dyspnea upon discharge

## 2021-07-19 NOTE — ASSESSMENT & PLAN NOTE
Patient remains under care of [facility]'s pulmonology  She has been smoke-free within past 10 days  Patient is completing course of Advair and will be switching to Spiriva as per pulmonology advise    Patient reports significant improvement of chronic dyspnea and cough after tobacco cessation

## 2021-07-19 NOTE — ASSESSMENT & PLAN NOTE
Status post chest tube placement and removal     Patient reports significant improvement of shortness of breath upon discharge

## 2021-07-21 ENCOUNTER — CLINICAL SUPPORT (OUTPATIENT)
Dept: PULMONOLOGY | Facility: CLINIC | Age: 61
End: 2021-07-21
Payer: COMMERCIAL

## 2021-07-21 DIAGNOSIS — J44.9 MODERATE COPD (CHRONIC OBSTRUCTIVE PULMONARY DISEASE) (HCC): ICD-10-CM

## 2021-07-21 PROCEDURE — G0424 PULMONARY REHAB W EXER: HCPCS

## 2021-07-26 ENCOUNTER — CLINICAL SUPPORT (OUTPATIENT)
Dept: PULMONOLOGY | Facility: CLINIC | Age: 61
End: 2021-07-26
Payer: COMMERCIAL

## 2021-07-26 DIAGNOSIS — J44.9 MODERATE COPD (CHRONIC OBSTRUCTIVE PULMONARY DISEASE) (HCC): ICD-10-CM

## 2021-07-26 PROCEDURE — G0424 PULMONARY REHAB W EXER: HCPCS

## 2021-07-28 ENCOUNTER — CLINICAL SUPPORT (OUTPATIENT)
Dept: PULMONOLOGY | Facility: CLINIC | Age: 61
End: 2021-07-28
Payer: COMMERCIAL

## 2021-07-28 DIAGNOSIS — J44.9 MODERATE COPD (CHRONIC OBSTRUCTIVE PULMONARY DISEASE) (HCC): ICD-10-CM

## 2021-07-28 PROCEDURE — G0424 PULMONARY REHAB W EXER: HCPCS

## 2021-07-28 NOTE — PROGRESS NOTES
Pulmonary Rehabilitation Plan of Care   30 Day Reassessment      Today's date: 2021   # of Exercise Sessions Completed: 6  Patient name: Mert Hanna      : 1960  Age: 57 y o  MRN: [id number]  Referring Physician: Cortez Portillo DO  Pulmonologist: Karrie Franco DO  Provider: [facility]  Clinician: Mario Penaloza, MS, [facility], [facility]    Dx:   Encounter Diagnosis   Name Primary?  Moderate COPD (chronic obstructive pulmonary disease) ([facility] )      Date of onset: 2021      SUMMARY OF PROGRESS:    Jalyn Millard is compliant attending pulmonary rehab exercise sessions 2x/wk  The patient tolerates 35-40 mins at 1 9 - 3 4 METs on room air  Resting SpO2 93 - 95% with maintained O2 saturation during exercise 92 - 96%  Resting BP  112/68 - 126/76 with appropriate response to exercise reaching 122/76- 144/72  RHR 67 - 75,  ExHR 83 - 88  The patient reports dyspnea during exercise  Rating of perceived dyspnea with exercise 4/10 at max METs  she has not begun a regular exercise program at home, but was encouraged to add walking on the days not at pulmonary rehab   The patient is a recent user, quit , and is doing well abstaining  She has abstained since quitting, and reports feeling much better since quitting  When addressed, the patient denies feelings of depression/anxiety/stress  Patient reports excellent social/emotional support  Patient attends group educational classes on pulmonary disease  Pursed lipped breathing continues to be demonstrated, practiced, and reinforced to the patient  Her exercise program will be progressed as tolerated  Patient has just begun doing more around the house following her operation and collapsed lung  The patient has the following personal goals he hopes to achieve by discharge: decrease SOB and increase ADLs     Pt will continue to be educated on lifestyle modifications and encouraged to supplement with a home exercise program to reach the following goals: begin a walking program in the next 30 days, increase ADLs        Medication compliance: Yes   Comments: None   Fall Risk: Low   Comments: None     Smoking: Recently quit- 2021 and has been doing well abstaining since    RPD @ Rest: 0/10  RPD w Exercise 3/10    Assessment of progression of lung disease and functional status:  CAT: 2440  Shortness of breath questionnaire: 40/120      EXERCISE ASSESSMENT and PLAN    Current Exercise Program in Rehab:       Frequency: 2 days/week        Minutes: 30         METS: 1 9-3 4              SpO2: >90%              RPD: 1-3                      HR:    RPE: 4-5         Modalities: Treadmill, UBE and Recumbent bike      Exercise Progression 30 Day Goals :    Frequency: 2 days/week        Minutes: 30-40         METS: 2- 4              SpO2: >90%              RPD: 1-3                      HR:    RPE: 4-5        Modalities: Treadmill, UBE and Recumbent bike     Strength trainin-3 days / week  12-15 repetitions  1-2 sets per modality    Modalities: Leg Press, Chest Press, Pull Downs, Arm Curl and Seated Row    Oxygen Needs: on room air at rest and on room air with exercise  Oxygen Goal: Maintain SpO2>90% during exercise    Home Exercise: none  Education: pursed lipped breathing, diaphragmatic breathing, home exercise, benefits of exercise for pulmonary disease, RPE scale, RPD scale, O2 saturation monitoring, appropriate O2 response to exercise and energy conservation    Goals: reduced score on  US Shortness of Breath Questionnaire, Improved 6MW distance by 10%, reduced dyspnea during exercise (0-3/10), improved exercise tolerance (max METs tolerated in pulmonary rehab), SpO2 >90% during exercise, reduced score on CAT, reduced number of COPD exacerbations, reduced RPD at rest, attend pulmonary rehab regularly, decrease sitting time at home, start a walking program and improved [facility]  Progressing:  Patient will add home exercise in the next 30 days, Will continue to educate and progress as tolerated  Plan: Titrate supplemental oxygen as needed to maintain SpO2>90% with exercise, learn to conserve energy with ADLs , practice breathing techniques 3x/day and reduce time sitting at home    Readiness to change: Preparation:  (Getting ready to change)       NUTRITION ASSESSMENT AND PLAN    Weight control:    Starting weight: 152 6 lbs    Current weight:   152 lbs    Diabetes: N/A    Goals:reduced BMI to < 25, Improved Rate Your Plate score  >68, 2 4-2%  wt loss, increase intake of fish, shellfish, cook without added fat or use vegetable oil/spray, increase intake of meatless meals, use low fat dairy, reduce cheese intake or use reduced-fat, eat 3 or more servings of whole grains a day, Eat 4-5 cups of fruits and vegetables daily, choose low sodium processed foods, eliminate butter, choose healthy snacks: light popcorn, plain pretzels, seldom eat or choose low fat ice-cream, fruit juice bars or frozen yogurt , eliminate or choose low-fat sweets and seldom eat out or choose lower fat menu items  Education: heart healthy eating  low sodium diet  hydration  nutrition for  lipid management  wt  loss   healthy choices while dining out  portion control  Progressing:Patient will work on weight loss in the next 30 days, Will continue to educate and progress as tolerated  Plan: replace butter with soft spreads made with olive oil, canola or yogurt, replace unhealthy snacks with fruits & vegs, remove salt shaker from table, use salt substitute like Mrs   Dash, increase utilization of fresh or dried herbs, will replace sugar sweetened cereals with whole grain or oatmeal and keep added daily sugar <25g/day  Readiness to change: Preparation:  (Getting ready to change)       PSYCHOSOCIAL ASSESSMENT AND PLAN    Emotional:  Depression assessment:  PHQ-9 = 1-4 = Minimal Depression            Anxiety measure:  AURELIA-7 = 0-4  = Not anxious  Self-reported stress level: 1   Social support: Excellent    Goals:  Daily Activity in [facility] Score < 3, Pain in [facility] Score < 3, Overall Health in [facility] Score < 3, Change in Health in [place] Score < 3 , improved positive thoughts of well being, increased energy and take time to relax  Education: signs/sxs of depression, benefits of a positive support system and stress management techniques    Progressing:Will continue to educate and progress as tolerated  Plan: Class: Stress and Your Health, Class: Relaxation, Practice relaxation techniques, Exercise and Enjoy a hobby  Readiness to change: Action:  (Changing behavior)      OTHER CORE COMPONENTS     Tobacco:   Social History     Tobacco Use   Smoking Status Former Smoker    Packs/day: 0 50    Years: 48 00    Pack years: 24 00    Types: Cigarettes    Start date: 5    Quit date: 2021    Years since quittin 0   Smokeless Tobacco Never Used       Tobacco Use Intervention: Referral to tobacco expert:   Pt quit 2021   and has abstained    Blood pressure:    Restin/68- 126/76   Exercise: 122/76- 144/72    Goals: consistent BP < 130/80, reduced dietary sodium <2300mg, moderate intensity exercise >150 mins/wk and medication compliance  Education:  pathophysiology of pulmonary disease, dangers of smoking, inspiratory muscle training, environmental triggers and traveling with pulmonary disease  Progressing:Patient will abstain from smoking in the next 30 days, Will continue to educate and progress as tolerated    Plan: Complete abstention from smoking, avoid places with second hand smoke, engage in regular exercise, eliminate salt shaker at the table, use salt substitutes and check labels for sodium content  Readiness to change: Action:  (Changing behavior)

## 2021-08-02 ENCOUNTER — CLINICAL SUPPORT (OUTPATIENT)
Dept: PULMONOLOGY | Facility: CLINIC | Age: 61
End: 2021-08-02
Payer: COMMERCIAL

## 2021-08-02 DIAGNOSIS — J44.9 MODERATE COPD (CHRONIC OBSTRUCTIVE PULMONARY DISEASE) (HCC): ICD-10-CM

## 2021-08-02 PROCEDURE — G0424 PULMONARY REHAB W EXER: HCPCS

## 2021-08-03 ENCOUNTER — VBI (OUTPATIENT)
Dept: ADMINISTRATIVE | Facility: OTHER | Age: 61
End: 2021-08-03

## 2021-08-04 ENCOUNTER — APPOINTMENT (OUTPATIENT)
Dept: PULMONOLOGY | Facility: CLINIC | Age: 61
End: 2021-08-04
Payer: COMMERCIAL

## 2021-08-05 ENCOUNTER — HOSPITAL ENCOUNTER (OUTPATIENT)
Dept: RADIOLOGY | Facility: HOSPITAL | Age: 61
Discharge: HOME | End: 2021-08-05
Payer: COMMERCIAL

## 2021-08-05 ENCOUNTER — OFFICE VISIT (OUTPATIENT)
Dept: PULMONOLOGY | Facility: CLINIC | Age: 61
End: 2021-08-05
Payer: COMMERCIAL

## 2021-08-05 VITALS
OXYGEN SATURATION: 96 % | RESPIRATION RATE: 16 BRPM | SYSTOLIC BLOOD PRESSURE: 120 MMHG | HEART RATE: 70 BPM | TEMPERATURE: 98.1 F | HEIGHT: 62 IN | WEIGHT: 152.8 LBS | DIASTOLIC BLOOD PRESSURE: 72 MMHG | BODY MASS INDEX: 28.12 KG/M2

## 2021-08-05 DIAGNOSIS — J95.811 POSTPROCEDURAL PNEUMOTHORAX: ICD-10-CM

## 2021-08-05 DIAGNOSIS — R91.1 SOLITARY PULMONARY NODULE: ICD-10-CM

## 2021-08-05 DIAGNOSIS — J44.9 MODERATE COPD (CHRONIC OBSTRUCTIVE PULMONARY DISEASE) (HCC): ICD-10-CM

## 2021-08-05 DIAGNOSIS — J95.811 PNEUMOTHORAX AFTER BIOPSY: Primary | ICD-10-CM

## 2021-08-05 DIAGNOSIS — F17.210 CONTINUOUS DEPENDENCE ON CIGARETTE SMOKING: Chronic | ICD-10-CM

## 2021-08-05 DIAGNOSIS — F17.210 CIGARETTE NICOTINE DEPENDENCE WITHOUT COMPLICATION: ICD-10-CM

## 2021-08-05 PROBLEM — R06.89 RESPIRATORY INSUFFICIENCY: Status: RESOLVED | Noted: 2021-07-12 | Resolved: 2021-08-05

## 2021-08-05 PROCEDURE — 99214 OFFICE O/P EST MOD 30 MIN: CPT | Performed by: NURSE PRACTITIONER

## 2021-08-05 PROCEDURE — 1111F DSCHRG MED/CURRENT MED MERGE: CPT | Performed by: NURSE PRACTITIONER

## 2021-08-05 PROCEDURE — 1036F TOBACCO NON-USER: CPT | Performed by: NURSE PRACTITIONER

## 2021-08-05 PROCEDURE — 3008F BODY MASS INDEX DOCD: CPT | Performed by: NURSE PRACTITIONER

## 2021-08-05 PROCEDURE — 71046 X-RAY EXAM CHEST 2 VIEWS: CPT

## 2021-08-05 RX ORDER — NICOTINE 21 MG/24HR
1 PATCH, TRANSDERMAL 24 HOURS TRANSDERMAL EVERY 24 HOURS
Qty: 28 PATCH | Refills: 0 | Status: SHIPPED | OUTPATIENT
Start: 2021-08-05 | End: 2021-11-01 | Stop reason: ALTCHOICE

## 2021-08-05 NOTE — ASSESSMENT & PLAN NOTE
Continue with Spiriva and albuterol as needed  Continue with pulmonary rehab  She is up-to-date on the pneumonia vaccine at this time and we discussed that she can likely get her next one after 72  She has not gotten the COVID vaccine and is not interested in discussing the COVID vaccine today  She typically gets the yearly flu vaccine

## 2021-08-05 NOTE — ASSESSMENT & PLAN NOTE
She has not smoked since she entered the hospital   She has however vapes occasionally  I discussed the risks of this and ultimately her goal is to completely quit both

## 2021-08-05 NOTE — PROGRESS NOTES
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  ([address] )     Continue with Spiriva and albuterol as needed  Continue with pulmonary rehab  She is up-to-date on the pneumonia vaccine at this time and we discussed that she can likely get her next one after 72  She has not gotten the COVID vaccine and is not interested in discussing the COVID vaccine today  She typically gets the yearly flu vaccine  Postprocedural pneumothorax     Will repeat chest x-ray given residual mild symptoms  I will call her with the results  Relevant Orders    XR chest pa & lateral       Other    Continuous dependence on cigarette smoking (Chronic)     She has not smoked since she entered the hospital   She has however vapes occasionally  I discussed the risks of this and ultimately her goal is to completely quit both  Relevant Medications    nicotine (NICODERM CQ) 14 mg/24hr TD 24 hr patch    Solitary pulmonary nodule     She underwent navigational bronchoscopy  Biopsy did not reveal any malignant cells, but did not completely rule out malignancy  Discussed with Dr Alex Lerner  She will be placed on tumor board discussion on Monday  She will follow-up with Dr Alex Lerner there after for next steps  Can consider thoracic surgery evaluation for resection  Repeat imaging will be considered after tumor board evaluation  In the meantime, she will continue with pulmonary rehab and improve her activity tolerance             Other Visit Diagnoses     Pneumothorax after biopsy    -  Primary    Cigarette nicotine dependence without complication        Relevant Medications    nicotine (NICODERM CQ) 14 mg/24hr TD 24 hr patch          Education provided at this visit:   Need for Vaccination: Had flu shot last year and up to date on pneumonia vaccine; Declines COVID vaccine discussion today   Pulmonary Rehab: Continue course  Smoking Cessation: Quit smoking prior to stef bronch procedure; occasionally vapes   Lung Cancer Screening: N/A at this time; See above  Inhaler Use: Reiterated proper use and technique  Return in about 4 weeks (around 9/2/2021), or if symptoms worsen or fail to improve  All of Natalia's questions were answered prior to leaving the office today  She will follow-up with Dr Huong Jane in 1 month or sooner should the need arise  She is aware to call our office with any further questions or concerns  History of Present Illness   Reason for Visit: Hospital follow-up  Chief Complaint: "I feel good "  HPI: Jean Carlos Noel is a 57 y o  female who presents to the office today for follow-up after recent hospitalization for postprocedural pneumothorax after navigational bronchoscopy  Overall, she reports she is doing very well  She denies any shortness of breath aside from her baseline shortness of breath with climbing stairs  She denies any cough, hemoptysis, or sputum production  She denies any fevers or chills  She does report she has some pain at the chest tube site and inside her lung at times  She reports this is better from hospital stay, but not resolved  Aside from the above, she has no other complaints  From a COPD standpoint, she maintains on Spiriva with albuterol as needed  She started pulmonary rehab directly prior to her lung biopsy and then took a few weeks off  She has less than a month into the course  Review of Systems   All other systems reviewed and are negative  A full 12-point review of systems was completed and is negative except for those outlined in the HPI      Historical Information   Past Medical History:   Diagnosis Date    COPD (chronic obstructive pulmonary disease) (HCC)     GERD (gastroesophageal reflux disease)     Hypercholesterolemia     Iliotibial band tendonitis     Last Assessed: 2015    Migraine     Prinzmetal angina (HCC)     Last Assessed: 2017    Sciatica     Last Assessed: 11/10/2014    Tobacco abuse 2018    Vertigo     Last Assessed: 2017     Past Surgical History:   Procedure Laterality Date    COLONOSCOPY  2011    Complete: Dr Zahra Blancas - due in 5 years, Colonoscopy 2017, Diverticulosis, 5 mm polyp, Due in 5 years    ESOPHAGOGASTRODUODENOSCOPY      Diagnostic; Last Assessed: 2014    FLEXOR TENDON REPAIR      left finger    HYSTERECTOMY      @ agr 28    IR CHEST TUBE PLACEMENT  2021    OK LARYNGOSCOPY,DIRECT,DIAGNOSTIC N/A 2019    Procedure: LARYNGOSCOPY DIRECT, FLEXIBLE BRONCHOSCOPY, FLEXIBLE ESOPHAGOSCOPY;  Surgeon: Fritz Trivedi MD;  [facility];  Service: ENT    SHOULDER ARTHROSCOPY Right     SHOULDER SURGERY      TOTAL ABDOMINAL HYSTERECTOMY W/ BILATERAL SALPINGOOPHORECTOMY      endometriosis;  Last Assessed: 2015     Family History   Problem Relation Age of Onset    Lung cancer Mother 47    Other Father         Dyslipidemia    Diabetes Sister     Hypertension Family     Ovarian cancer Maternal Grandmother 48    No Known Problems Daughter     No Known Problems Maternal Grandfather     Lung cancer Paternal Grandmother     No Known Problems Paternal Grandfather     No Known Problems Sister     No Known Problems Sister     No Known Problems Daughter     No Known Problems Maternal Aunt     No Known Problems Paternal Aunt     No Known Problems Paternal Aunt     No Known Problems Paternal Aunt      Social History   Social History     Substance and Sexual Activity   Alcohol Use No     Social History     Substance and Sexual Activity   Drug Use No     Social History     Tobacco Use   Smoking Status Former Smoker    Packs/day: 0 50    Years: 48 00    Pack years: 24 00    Types: Cigarettes    Start date: 5    Quit date: 2021    Years since quittin 0   Smokeless Tobacco Never Used E-Cigarette/Vaping    E-Cigarette Use Former User     Quit Date 7/1/21      E-Cigarette/Vaping Substances    Nicotine No     THC No     CBD No     Flavoring No     Other No     Unknown No        Meds/Allergies     Current Outpatient Medications:     albuterol (2 5 mg/3 mL) 0 083 % nebulizer solution, Take 1 vial (2 5 mg total) by nebulization every 4 (four) hours as needed for wheezing or shortness of breath, Disp: 120 vial, Rfl: 3    Albuterol Sulfate (ProAir RespiClick) 880 (90 Base) MCG/ACT AEPB, Two puffs every 4-6 hours as needed for cough/wheeze/shortness of breath, Disp: 1 each, Rfl: 5    aspirin (ECOTRIN LOW STRENGTH) 81 mg EC tablet, Take 1 tablet by mouth daily, Disp: , Rfl:     buPROPion (WELLBUTRIN XL) 300 mg 24 hr tablet, Take 1 tablet (300 mg total) by mouth every morning, Disp: 90 tablet, Rfl: 3    cyclobenzaprine (FLEXERIL) 5 mg tablet, Take 1 tablet (5 mg total) by mouth 3 (three) times a day as needed for muscle spasms, Disp: 20 tablet, Rfl: 0    nicotine (NICODERM CQ) 14 mg/24hr TD 24 hr patch, Place 1 patch on the skin every 24 hours, Disp: 28 patch, Rfl: 0    nitroglycerin (Nitrostat) 0 4 mg SL tablet, Place 1 tablet (0 4 mg total) under the tongue every 5 (five) minutes as needed for chest pain, Disp: 30 tablet, Rfl: 1    pantoprazole (PROTONIX) 40 mg tablet, TAKE 1 TABLET BY MOUTH EVERY DAY, Disp: 90 tablet, Rfl: 3    POTASSIUM CHLORIDE PO, Take 1 caplet by mouth daily, Disp: , Rfl:     rosuvastatin (CRESTOR) 20 MG tablet, TAKE 1 TABLET DAILY, Disp: 90 tablet, Rfl: 3    Spiriva Respimat 2 5 MCG/ACT AERS inhaler, Inhale 2 puffs daily , Disp: , Rfl:     verapamil (CALAN-SR) 240 mg CR tablet, TAKE 1 TABLET DAILY, Disp: 90 tablet, Rfl: 3  Allergies   Allergen Reactions    Darvon [Propoxyphene] Itching       Vitals: Blood pressure 120/72, pulse 70, temperature 98 1 °F (36 7 °C), temperature source Temporal, resp   rate 16, height 5' 2" (1 575 m), weight 69 3 kg (152 lb 12 8 oz), SpO2 96 %, not currently breastfeeding  Body mass index is 27 95 kg/m²  Oxygen Therapy  SpO2: 96 %  Oxygen Therapy: None (Room air)    Physical Exam:  Physical Exam  Vitals reviewed  Constitutional:       General: She is not in acute distress  Appearance: She is well-developed  She is not toxic-appearing or diaphoretic  HENT:      Head: Normocephalic and atraumatic  Eyes:      General: No scleral icterus  Neck:      Trachea: No tracheal deviation  Cardiovascular:      Rate and Rhythm: Normal rate and regular rhythm  Heart sounds: S1 normal and S2 normal  No murmur heard  No friction rub  No gallop  Pulmonary:      Effort: Pulmonary effort is normal  No tachypnea, accessory muscle usage or respiratory distress  Breath sounds: Normal breath sounds  No stridor  No decreased breath sounds, wheezing, rhonchi or rales  Chest:      Chest wall: No tenderness  Abdominal:      General: Bowel sounds are normal  There is no distension  Palpations: Abdomen is soft  Tenderness: There is no abdominal tenderness  There is no guarding or rebound  Musculoskeletal:      Cervical back: Neck supple  Skin:     General: Skin is warm and dry  Findings: No rash  Neurological:      Mental Status: She is alert and oriented to person, place, and time  GCS: GCS eye subscore is 4  GCS verbal subscore is 5  GCS motor subscore is 6  Psychiatric:         Speech: Speech normal          Behavior: Behavior normal  Behavior is cooperative  Imaging and other studies: I have personally reviewed pertinent reports  and Last CXR showed no pneumothorax  Pulmonary Results (PFTs, PSG): I have personally reviewed pertinent reports  and PFTs in May showed moderate COPD  LUCIE Tran  [facility]        Portions of the record may have been created with voice recognition software    Occasional wrong word or "sound a like" substitutions may have occurred due to the inherent limitations of voice recognition software  Read the chart carefully and recognize, using context, where substitutions have occurred or contact the dictating provider

## 2021-08-09 ENCOUNTER — TELEPHONE (OUTPATIENT)
Dept: PULMONOLOGY | Facility: CLINIC | Age: 61
End: 2021-08-09

## 2021-08-09 ENCOUNTER — DOCUMENTATION (OUTPATIENT)
Dept: HEMATOLOGY ONCOLOGY | Facility: CLINIC | Age: 61
End: 2021-08-09

## 2021-08-09 ENCOUNTER — CLINICAL SUPPORT (OUTPATIENT)
Dept: PULMONOLOGY | Facility: CLINIC | Age: 61
End: 2021-08-09
Payer: COMMERCIAL

## 2021-08-09 DIAGNOSIS — J44.9 MODERATE COPD (CHRONIC OBSTRUCTIVE PULMONARY DISEASE) (HCC): ICD-10-CM

## 2021-08-09 PROCEDURE — G0424 PULMONARY REHAB W EXER: HCPCS

## 2021-08-10 DIAGNOSIS — R91.1 SOLITARY PULMONARY NODULE: Primary | ICD-10-CM

## 2021-08-10 NOTE — PROGRESS NOTES
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

## 2021-08-10 NOTE — PROGRESS NOTES
THORACIC ONCOLOGY MULTIDISCIPLINARY CASE REVIEW    DATE:  8/9/2021    PRESENTING DOCTOR: Dr Javier Lynch    DIAGNOSIS: N/A  STAGING:     Adalberto Fitzgerald is a 57 y o  female who was presented at the Thoracic Oncology Multidisciplinary Tumor Conference today  Patient follows with Pulmonology for COPD with emphysema and slow growing lung nodule  PHYSICIAN RECOMMENDED PLAN: 1 yr F/U CT chest   Based on family history, genetic testing if patient is interested  Imaging reviewed: 10/17/2017-Baseline    6/19/2021 CT chest-MICHAEL lung nodule measuring 9 x 8 mm stable in size since 4/22/2021 but slow growing since 10/17/2017  Pathology reviewed: 7/6/2021 Navigational bronchoscopy was non-diagnostic    PFT's reviewed: 5/27/2021 FEV1 65%; DLCO 58%    Future imaging: CT chest in 1 yr    Referrals: Genetic testing   Procedures: None at this time  Team agreed to plan  NCCN guidelines were readily available for review at this discussion    The final treatment plan will be left to the discretion of the patient and the treating physician  DISCLAIMERS:  TO THE TREATING PHYSICIAN:  This conference is a meeting of clinicians from various specialty areas who evaluate and discuss patients for whom a multidisciplinary treatment approach is being considered  Please note that the above opinion was a consensus of the conference attendees and is intended only to assist in quality care of the discussed patient  The responsibility for follow up on the input given during the conference, along with any final decisions regarding plan of care, is that of the patient and the patient's provider  Accordingly, appointments have only been recommended based on this information and have NOT been scheduled unless otherwise noted  TO THE PATIENT:  This summary is a brief record of major aspects of your cancer treatment  You may choose to share a copy with any of your doctors or nurses   However, this is not a detailed or comprehensive record of your care

## 2021-08-11 ENCOUNTER — CLINICAL SUPPORT (OUTPATIENT)
Dept: PULMONOLOGY | Facility: CLINIC | Age: 61
End: 2021-08-11
Payer: COMMERCIAL

## 2021-08-11 DIAGNOSIS — J44.9 MODERATE COPD (CHRONIC OBSTRUCTIVE PULMONARY DISEASE) (HCC): ICD-10-CM

## 2021-08-11 PROCEDURE — G0424 PULMONARY REHAB W EXER: HCPCS

## 2021-08-16 ENCOUNTER — CLINICAL SUPPORT (OUTPATIENT)
Dept: PULMONOLOGY | Facility: CLINIC | Age: 61
End: 2021-08-16
Payer: COMMERCIAL

## 2021-08-16 DIAGNOSIS — J44.9 MODERATE COPD (CHRONIC OBSTRUCTIVE PULMONARY DISEASE) (HCC): ICD-10-CM

## 2021-08-16 PROCEDURE — G0424 PULMONARY REHAB W EXER: HCPCS

## 2021-08-18 ENCOUNTER — CLINICAL SUPPORT (OUTPATIENT)
Dept: PULMONOLOGY | Facility: CLINIC | Age: 61
End: 2021-08-18
Payer: COMMERCIAL

## 2021-08-18 DIAGNOSIS — J44.9 MODERATE COPD (CHRONIC OBSTRUCTIVE PULMONARY DISEASE) (HCC): ICD-10-CM

## 2021-08-18 PROCEDURE — G0424 PULMONARY REHAB W EXER: HCPCS

## 2021-08-23 ENCOUNTER — CLINICAL SUPPORT (OUTPATIENT)
Dept: PULMONOLOGY | Facility: CLINIC | Age: 61
End: 2021-08-23
Payer: COMMERCIAL

## 2021-08-23 DIAGNOSIS — J44.9 MODERATE COPD (CHRONIC OBSTRUCTIVE PULMONARY DISEASE) (HCC): ICD-10-CM

## 2021-08-23 PROCEDURE — G0424 PULMONARY REHAB W EXER: HCPCS

## 2021-08-25 ENCOUNTER — OFFICE VISIT (OUTPATIENT)
Dept: PULMONOLOGY | Facility: CLINIC | Age: 61
End: 2021-08-25
Payer: COMMERCIAL

## 2021-08-25 ENCOUNTER — CLINICAL SUPPORT (OUTPATIENT)
Dept: PULMONOLOGY | Facility: CLINIC | Age: 61
End: 2021-08-25
Payer: COMMERCIAL

## 2021-08-25 VITALS
DIASTOLIC BLOOD PRESSURE: 70 MMHG | SYSTOLIC BLOOD PRESSURE: 124 MMHG | WEIGHT: 154.2 LBS | RESPIRATION RATE: 18 BRPM | BODY MASS INDEX: 28.37 KG/M2 | TEMPERATURE: 97.6 F | HEIGHT: 62 IN | OXYGEN SATURATION: 98 % | HEART RATE: 74 BPM

## 2021-08-25 DIAGNOSIS — J44.9 MODERATE COPD (CHRONIC OBSTRUCTIVE PULMONARY DISEASE) (HCC): Primary | ICD-10-CM

## 2021-08-25 DIAGNOSIS — R91.1 SOLITARY PULMONARY NODULE: ICD-10-CM

## 2021-08-25 DIAGNOSIS — J44.9 MODERATE COPD (CHRONIC OBSTRUCTIVE PULMONARY DISEASE) (HCC): ICD-10-CM

## 2021-08-25 DIAGNOSIS — J95.811 POSTPROCEDURAL PNEUMOTHORAX: ICD-10-CM

## 2021-08-25 DIAGNOSIS — F17.211 CIGARETTE NICOTINE DEPENDENCE IN REMISSION: ICD-10-CM

## 2021-08-25 PROCEDURE — 1036F TOBACCO NON-USER: CPT | Performed by: INTERNAL MEDICINE

## 2021-08-25 PROCEDURE — G0424 PULMONARY REHAB W EXER: HCPCS

## 2021-08-25 PROCEDURE — 3008F BODY MASS INDEX DOCD: CPT | Performed by: INTERNAL MEDICINE

## 2021-08-25 PROCEDURE — 99214 OFFICE O/P EST MOD 30 MIN: CPT | Performed by: INTERNAL MEDICINE

## 2021-08-25 RX ORDER — TIOTROPIUM BROMIDE INHALATION SPRAY 3.12 UG/1
2 SPRAY, METERED RESPIRATORY (INHALATION) DAILY
Qty: 4 G | Refills: 11 | Status: SHIPPED | OUTPATIENT
Start: 2021-08-25 | End: 2021-09-15 | Stop reason: SDUPTHER

## 2021-08-25 NOTE — ASSESSMENT & PLAN NOTE
She continues to have some mild residual symptoms that are resolving  No further testing needed at this time

## 2021-08-25 NOTE — ASSESSMENT & PLAN NOTE
Overall she is doing well from a pulmonary standpoint  She will continue on Spiriva daily with albuterol as needed  She will continue pulmonary rehabilitation  She is not interested in receiving a COVID vaccine, and would not like me to re-educate her on the vaccine as we discussed at our last office visit

## 2021-08-25 NOTE — PROGRESS NOTES
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)  Overall she is doing well from a pulmonary standpoint  She will continue on Spiriva daily with albuterol as needed  She will continue pulmonary rehabilitation  She is not interested in receiving a COVID vaccine, and would not like me to re-educate her on the vaccine as we discussed at our last office visit  Postprocedural pneumothorax  She continues to have some mild residual symptoms that are resolving  No further testing needed at this time  Solitary pulmonary nodule  Initial biopsy was nondiagnostic  In reviewing images, there may be some subtle findings consistent with hamartoma  Regardless it has been very slow growing, after review with chest tumor Board, plan is for repeat image at 1 year which will be July 2022  Cigarette nicotine dependence in remission  I encouraged ongoing smoking cessation  She clearly feels better now that she is not smoking  Occasional she uses a vape, and is working on cutting that down as well  Plan:    Diagnoses and all orders for this visit:    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)  -     Spiriva Respimat 2 5 MCG/ACT AERS inhaler; Inhale 2 puffs daily    Postprocedural pneumothorax    Solitary pulmonary nodule    Cigarette nicotine dependence in remission        Follow-up:  1 year after repeat CT      HPI:  She is overall doing well  She still has some sensations in her chest from site of previous chest tube  No cough or wheezing    Still with some dyspnea on the stairs    Still not smoking for almost 2 months - occasionally vaping to help     She remains on Spiriva  Not on albuterol much at all      Review of Systems   Constitutional: Negative for activity change  Respiratory: Positive for shortness of breath  Negative for cough, chest tightness and wheezing  Cardiovascular: Negative for leg swelling     Musculoskeletal: Negative for gait problem  Skin: Negative for rash  Psychiatric/Behavioral: Negative for sleep disturbance         The following portions of the patient's history were reviewed and updated as appropriate: allergies, current medications, past family history, past medical history, past social history, past surgical history and problem list     VITALS:  Vitals:    08/25/21 1205   BP: 124/70   BP Location: Left arm   Patient Position: Sitting   Cuff Size: Adult   Pulse: 74   Resp: 18   Temp: 97 6 °F (36 4 °C)   TempSrc: Tympanic   SpO2: 98%   Weight: 69 9 kg (154 lb 3 2 oz)   Height: 5' 2" (1 575 m)       Physical Exam  General:  Patient is awake, alert, non-toxic and in no acute respiratory distress  Neck: No JVD  CV:  Regular, +S1 and S2, No murmurs, gallops or rubs appreciated  Lungs:  Clear to auscultation bilateral without wheeze, rales or rhonchi  Abdomen: Soft, +BS, Non-tender, non-distended  Extremities: No clubbing, cyanosis or edema  Neuro: No focal deficits        Diagnostic Testing:      CBC:  Lab Results   Component Value Date    WBC 7 95 07/10/2021    HGB 14 3 07/10/2021    HCT 43 9 07/10/2021    MCV 92 07/10/2021     07/10/2021         BMP:   Lab Results   Component Value Date     01/18/2018    K 3 9 07/10/2021     07/10/2021    CO2 28 07/10/2021    BUN 14 07/10/2021    CREATININE 0 70 07/10/2021    GLUF 105 (H) 04/20/2021    CALCIUM 8 8 07/10/2021    AST 17 04/20/2021    ALT 32 04/20/2021    ALKPHOS 103 04/20/2021    PROT 7 0 01/18/2018    BILITOT 0 4 01/18/2018    EGFR 93 07/10/2021       Mark DO [place]

## 2021-08-25 NOTE — ASSESSMENT & PLAN NOTE
I encouraged ongoing smoking cessation  She clearly feels better now that she is not smoking  Occasional she uses a vape, and is working on cutting that down as well

## 2021-08-25 NOTE — PROGRESS NOTES
Pulmonary Rehabilitation Plan of Care   Initial Care Plan      Today's date: 2021   # of Exercise Sessions Completed: 13  Patient name: Miriam Ochoa      : 1960  Age: 57 y o  MRN: [id number]  Referring Physician: Trevin Nickerson DO  Pulmonologist: Nikkie Sanderson DO  Provider: [facility]  Clinician: Zara Ariza MS, [facility], [facility]    Dx:   Encounter Diagnosis   Name Primary?  Moderate COPD (chronic obstructive pulmonary disease) ([facility] )      Date of onset: 2021      SUMMARY OF PROGRESS:    Omari Matute is compliant attending pulmonary rehab exercise sessions 2x/wk  The patient tolerates 40 mins at 1 9 - 3 4 METs on room air  Resting SpO2 95 - 98% with maintained O2 saturation during exercise 92 - 96%  Resting BP  112/72 - 136/62 with appropriate response to exercise reaching 130/82 - 138/76  RHR 70 - 76,  ExHR 88 - 95  The patient reports dyspnea during exercise  Rating of perceived dyspnea with exercise 4/10 at max METs  she has not begun a regular exercise program at home, but was encouraged to add walking on the days not at pulmonary rehab   The patient is a recent user, quit , and is doing well abstaining  She has abstained since quitting, and reports feeling much better since quitting  When addressed, the patient denies feelings of depression/anxiety/stress  Patient reports excellent social/emotional support  Patient attends group educational classes on pulmonary disease  Pursed lipped breathing continues to be demonstrated, practiced, and reinforced to the patient  Her exercise program will be progressed as tolerated  Patient has just begun doing more around the house following her operation and collapsed lung  The patient has the following personal goals he hopes to achieve by discharge: decrease SOB and increase ADLs     Pt will continue to be educated on lifestyle modifications and encouraged to supplement with a home exercise program to reach the following goals: begin a walking program in the next 30 days, increase ADLs        Medication compliance: Yes   Comments: None   Fall Risk: Low   Comments: None     Smoking: Recently quit- 2021 and has been doing well abstaining since    RPD @ Rest: 0/10  RPD w Exercise 3/10    Assessment of progression of lung disease and functional status:  CAT: 24  Shortness of breath questionnaire: 40/120      EXERCISE ASSESSMENT and PLAN    Current Exercise Program in Rehab:       Frequency: 2 days/week        Minutes: 40         METS: 1 9-3 4              SpO2: >90%              RPD: 1-3                      HR:    RPE: 4-5         Modalities: Treadmill, UBE, NuStep and Recumbent bike      Exercise Progression 30 Day Goals :    Frequency: 2 days/week        Minutes: 40-45        METS: 2 5- 4              SpO2: >90%              RPD: 1-3                      HR:    RPE: 4-5        Modalities: Treadmill, UBE, NuStep and Recumbent bike     Strength trainin-3 days / week  12-15 repetitions  1-2 sets per modality    Modalities: Leg Press, Chest Press, Pull Downs, Arm Curl and Seated Row    Oxygen Needs: on room air at rest and on room air with exercise  Oxygen Goal: Maintain SpO2>90% during exercise    Home Exercise: none  Education: pursed lipped breathing, diaphragmatic breathing, home exercise, benefits of exercise for pulmonary disease, RPE scale, RPD scale, O2 saturation monitoring, appropriate O2 response to exercise and energy conservation    Goals: reduced score on  USCD Shortness of Breath Questionnaire, Improved 6MW distance by 10%, reduced dyspnea during exercise (0-3/10), improved exercise tolerance (max METs tolerated in pulmonary rehab), SpO2 >90% during exercise, reduced score on CAT, reduced number of COPD exacerbations, reduced RPD at rest, attend pulmonary rehab regularly, decrease sitting time at home, start a walking program and improved [facility]  Progressing:  Patient will add home exercise in the next 30 days, Will continue to educate and progress as tolerated  Plan: Titrate supplemental oxygen as needed to maintain SpO2>90% with exercise, learn to conserve energy with ADLs , practice breathing techniques 3x/day and reduce time sitting at home    Readiness to change: Action:  (Changing behavior)      NUTRITION ASSESSMENT AND PLAN    Weight control:    Starting weight: 152 6 lbs    Current weight:   152 lbs    Diabetes: N/A    Goals:reduced BMI to < 25, Improved Rate Your Plate score  >79, 7 3-5%  wt loss, increase intake of fish, shellfish, cook without added fat or use vegetable oil/spray, increase intake of meatless meals, use low fat dairy, reduce cheese intake or use reduced-fat, eat 3 or more servings of whole grains a day, Eat 4-5 cups of fruits and vegetables daily, choose low sodium processed foods, eliminate butter, choose healthy snacks: light popcorn, plain pretzels, seldom eat or choose low fat ice-cream, fruit juice bars or frozen yogurt , eliminate or choose low-fat sweets and seldom eat out or choose lower fat menu items  Education: heart healthy eating  low sodium diet  hydration  nutrition for  lipid management  wt  loss   healthy choices while dining out  portion control  Progressing:Patient will work on weight loss in the next 30 days, Will continue to educate and progress as tolerated  Plan: replace butter with soft spreads made with olive oil, canola or yogurt, replace unhealthy snacks with fruits & vegs, remove salt shaker from table, use salt substitute like Mrs   Dash, increase utilization of fresh or dried herbs, will replace sugar sweetened cereals with whole grain or oatmeal and keep added daily sugar <25g/day  Readiness to change: Action:  (Changing behavior)      PSYCHOSOCIAL ASSESSMENT AND PLAN    Emotional:  Depression assessment:  PHQ-9 = 1-4 = Minimal Depression            Anxiety measure:  AURELIA-7 = 0-4  = Not anxious  Self-reported stress level: 1   Social support: Excellent    Goals:  Daily Activity in [facility] Score < 3, Pain in [facility] Score < 3, Overall Health in [facility] Score < 3, Change in Health in [place] Score < 3 , improved positive thoughts of well being, increased energy and take time to relax  Education: signs/sxs of depression, benefits of a positive support system and stress management techniques    Progressing:Will continue to educate and progress as tolerated  Plan: Class: Stress and Your Health, Class: Relaxation, Practice relaxation techniques, Exercise and Enjoy a hobby  Readiness to change: Action:  (Changing behavior)      OTHER CORE COMPONENTS     Tobacco:   Social History     Tobacco Use   Smoking Status Former Smoker    Packs/day: 0 50    Years: 48 00    Pack years: 24 00    Types: Cigarettes    Start date: 5    Quit date: 2021    Years since quittin 1   Smokeless Tobacco Never Used       Tobacco Use Intervention: Referral to tobacco expert:   Pt quit 2021   and has abstained    Blood pressure:    Restin/72 - 136/62   Exercise: 116/72 - 138/72    Goals: consistent BP < 130/80, reduced dietary sodium <2300mg, moderate intensity exercise >150 mins/wk and medication compliance  Education:  pathophysiology of pulmonary disease, dangers of smoking, inspiratory muscle training, environmental triggers and traveling with pulmonary disease  Progressing:Patient will abstain from smoking in the next 30 days, Will continue to educate and progress as tolerated    Plan: Complete abstention from smoking, avoid places with second hand smoke, engage in regular exercise, eliminate salt shaker at the table, use salt substitutes and check labels for sodium content  Readiness to change: Action:  (Changing behavior)

## 2021-08-25 NOTE — ASSESSMENT & PLAN NOTE
Initial biopsy was nondiagnostic  In reviewing images, there may be some subtle findings consistent with hamartoma  Regardless it has been very slow growing, after review with chest tumor Board, plan is for repeat image at 1 year which will be July 2022

## 2021-08-30 ENCOUNTER — APPOINTMENT (OUTPATIENT)
Dept: PULMONOLOGY | Facility: CLINIC | Age: 61
End: 2021-08-30
Payer: COMMERCIAL

## 2021-09-01 ENCOUNTER — CLINICAL SUPPORT (OUTPATIENT)
Dept: PULMONOLOGY | Facility: CLINIC | Age: 61
End: 2021-09-01
Payer: COMMERCIAL

## 2021-09-01 DIAGNOSIS — J44.9 MODERATE COPD (CHRONIC OBSTRUCTIVE PULMONARY DISEASE) (HCC): ICD-10-CM

## 2021-09-01 PROCEDURE — G0424 PULMONARY REHAB W EXER: HCPCS

## 2021-09-06 ENCOUNTER — APPOINTMENT (OUTPATIENT)
Dept: PULMONOLOGY | Facility: CLINIC | Age: 61
End: 2021-09-06
Payer: COMMERCIAL

## 2021-09-08 ENCOUNTER — CLINICAL SUPPORT (OUTPATIENT)
Dept: PULMONOLOGY | Facility: CLINIC | Age: 61
End: 2021-09-08
Payer: COMMERCIAL

## 2021-09-08 DIAGNOSIS — J44.9 MODERATE COPD (CHRONIC OBSTRUCTIVE PULMONARY DISEASE) (HCC): ICD-10-CM

## 2021-09-08 PROCEDURE — G0424 PULMONARY REHAB W EXER: HCPCS

## 2021-09-13 ENCOUNTER — APPOINTMENT (OUTPATIENT)
Dept: PULMONOLOGY | Facility: CLINIC | Age: 61
End: 2021-09-13
Payer: COMMERCIAL

## 2021-09-15 ENCOUNTER — CLINICAL SUPPORT (OUTPATIENT)
Dept: PULMONOLOGY | Facility: CLINIC | Age: 61
End: 2021-09-15
Payer: COMMERCIAL

## 2021-09-15 DIAGNOSIS — J44.9 MODERATE COPD (CHRONIC OBSTRUCTIVE PULMONARY DISEASE) (HCC): ICD-10-CM

## 2021-09-15 PROCEDURE — G0424 PULMONARY REHAB W EXER: HCPCS

## 2021-09-15 RX ORDER — TIOTROPIUM BROMIDE INHALATION SPRAY 3.12 UG/1
2 SPRAY, METERED RESPIRATORY (INHALATION) DAILY
Qty: 12 G | Refills: 3 | Status: SHIPPED | OUTPATIENT
Start: 2021-09-15 | End: 2021-09-20 | Stop reason: CLARIF

## 2021-09-20 ENCOUNTER — CLINICAL SUPPORT (OUTPATIENT)
Dept: PULMONOLOGY | Facility: CLINIC | Age: 61
End: 2021-09-20
Payer: COMMERCIAL

## 2021-09-20 DIAGNOSIS — J44.9 MODERATE COPD (CHRONIC OBSTRUCTIVE PULMONARY DISEASE) (HCC): ICD-10-CM

## 2021-09-20 PROCEDURE — G0424 PULMONARY REHAB W EXER: HCPCS

## 2021-09-22 ENCOUNTER — CLINICAL SUPPORT (OUTPATIENT)
Dept: PULMONOLOGY | Facility: CLINIC | Age: 61
End: 2021-09-22
Payer: COMMERCIAL

## 2021-09-22 DIAGNOSIS — J44.9 MODERATE COPD (CHRONIC OBSTRUCTIVE PULMONARY DISEASE) (HCC): ICD-10-CM

## 2021-09-22 PROCEDURE — G0424 PULMONARY REHAB W EXER: HCPCS

## 2021-09-22 NOTE — PROGRESS NOTES
Pulmonary Rehabilitation Plan of Care   Initial Care Plan      Today's date: 2021   # of Exercise Sessions Completed: 18  Patient name: Hiram Ortiz      : 1960  Age: 57 y o  MRN: [id number]  Referring Physician: Claus Saez DO  Pulmonologist: Neal Pérez DO  Provider: [facility]  Clinician: Chanda Chapman MS, CEP      Dx:   Encounter Diagnosis   Name Primary?  Moderate COPD (chronic obstructive pulmonary disease) ([facility]      Date of onset: 2021      SUMMARY OF PROGRESS:    Karli Figueroa is compliant attending pulmonary rehab exercise sessions 2x/wk  The patient tolerates 40-45 mins at 2 4-3 0 METs on room air  Resting SpO2 93 - 97% with maintained O2 saturation during exercise 92 - 95%  Resting BP  108/64 - 122/72 with appropriate response to exercise reaching 116/72 - 148/82  RHR 70 - 78,  ExHR 93 - 99  The patient reports dyspnea during moderately vigorous exercise  Rating of perceived dyspnea with exercise 1-5/10 at max METs  She has not begun a regular exercise program at home, but was encouraged to add walking on the days not at pulmonary rehab  She also has a stepping machine at home that she was encouraged to use on opposite days  The patient is a recent user, quit , and is doing well abstaining  She has abstained since quitting, and reports feeling much better since quitting  When addressed, the patient denies feelings of depression/anxiety/stress  Patient reports excellent social/emotional support  Patient attends group educational classes on pulmonary disease  Pursed lipped breathing continues to be demonstrated, practiced, and reinforced to the patient  Her exercise program will be progressed as tolerated  Patient has just begun doing more around the house following her operation and collapsed lung  The patient has the following personal goals he hopes to achieve by discharge: decrease SOB and increase ADLs   She reports not having any problems with main in her side since her lung surgery  Pt will continue to be educated on lifestyle modifications and encouraged to supplement with a home exercise program to reach the following goals: begin a walking program in the next 30 days, increase ADLs        Medication compliance: Yes   Comments: None   Fall Risk: Low   Comments: None     Smoking: Recently quit- 2021 and has been doing well abstaining since    RPD @ Rest: 0/10  RPD w Exercise 1-5/10    Assessment of progression of lung disease and functional status:  CAT: 2440  Shortness of breath questionnaire: 40/120      EXERCISE ASSESSMENT and PLAN    Current Exercise Program in Rehab:       Frequency: 2 days/week        Minutes: 40-45         METS: 2 4-3 0              SpO2: >90%              RPD: 1-5                      HR:    RPE: 4-5         Modalities: Treadmill, UBE, NuStep and Recumbent bike      Exercise Progression 30 Day Goals :    Frequency: 2 days/week        Minutes: 40-45        METS: 2 5- 4              SpO2: >90%              RPD: 1-3                      HR:    RPE: 4-5        Modalities: Treadmill, UBE, NuStep and Recumbent bike     Strength trainin-3 days / week  12-15 repetitions  1-2 sets per modality    Modalities: Leg Press, Chest Press, Pull Downs, Arm Curl and Seated Row    Oxygen Needs: on room air at rest and on room air with exercise  Oxygen Goal: Maintain SpO2>90% during exercise    Home Exercise: none  Education: pursed lipped breathing, diaphragmatic breathing, home exercise, benefits of exercise for pulmonary disease, RPE scale, RPD scale, O2 saturation monitoring, appropriate O2 response to exercise and energy conservation    Goals: reduced score on  USCD Shortness of Breath Questionnaire, Improved 6MW distance by 10%, reduced dyspnea during exercise (0-3/10), improved exercise tolerance (max METs tolerated in pulmonary rehab), SpO2 >90% during exercise, reduced score on CAT, reduced number of COPD exacerbations, reduced RPD at rest, attend pulmonary rehab regularly, decrease sitting time at home, start a walking program and improved [facility]  Progressing:  Patient will add home exercise in the next 30 days, Will continue to educate and progress as tolerated  Plan: Titrate supplemental oxygen as needed to maintain SpO2>90% with exercise, learn to conserve energy with ADLs , practice breathing techniques 3x/day and reduce time sitting at home    Readiness to change: Action:  (Changing behavior)      NUTRITION ASSESSMENT AND PLAN    Weight control:    Starting weight: 152 6 lbs    Current weight:   152 lbs    Diabetes: N/A    Goals:reduced BMI to < 25, Improved Rate Your Plate score  >83, 7 4-8%  wt loss, increase intake of fish, shellfish, cook without added fat or use vegetable oil/spray, increase intake of meatless meals, use low fat dairy, reduce cheese intake or use reduced-fat, eat 3 or more servings of whole grains a day, Eat 4-5 cups of fruits and vegetables daily, choose low sodium processed foods, eliminate butter, choose healthy snacks: light popcorn, plain pretzels, seldom eat or choose low fat ice-cream, fruit juice bars or frozen yogurt , eliminate or choose low-fat sweets and seldom eat out or choose lower fat menu items  Education: heart healthy eating  low sodium diet  hydration  nutrition for  lipid management  wt  loss   healthy choices while dining out  portion control  Progressing:Patient will work on weight loss in the next 30 days, Will continue to educate and progress as tolerated  Plan: replace butter with soft spreads made with olive oil, canola or yogurt, replace unhealthy snacks with fruits & vegs, remove salt shaker from table, use salt substitute like Mrs   Fan, increase utilization of fresh or dried herbs, will replace sugar sweetened cereals with whole grain or oatmeal and keep added daily sugar <25g/day  Readiness to change: Action:  (Changing behavior)      PSYCHOSOCIAL ASSESSMENT AND PLAN    Emotional:  Depression assessment:  PHQ-9 = 1-4 = Minimal Depression            Anxiety measure:  AURELIA-7 = 0-4  = Not anxious  Self-reported stress level: 1   Social support: Excellent    Goals:  Daily Activity in [facility] Score < 3, Pain in [facility] Score < 3, Overall Health in [facility] Score < 3, Change in Health in [place] Score < 3 , improved positive thoughts of well being, increased energy and take time to relax  Education: signs/sxs of depression, benefits of a positive support system and stress management techniques    Progressing:Will continue to educate and progress as tolerated  Plan: Class: Stress and Your Health, Class: Relaxation, Practice relaxation techniques, Exercise and Enjoy a hobby  Readiness to change: Action:  (Changing behavior)      OTHER CORE COMPONENTS     Tobacco:   Social History     Tobacco Use   Smoking Status Former Smoker    Packs/day: 0 50    Years: 48 00    Pack years: 24 00    Types: Cigarettes    Start date: 5    Quit date: 2021    Years since quittin 2   Smokeless Tobacco Never Used       Tobacco Use Intervention: Referral to tobacco expert:   Pt quit 2021   and has abstained    Blood pressure:    Restin/64 - 122/72   Exercise: 1116/72 - 148/82    Goals: consistent BP < 130/80, reduced dietary sodium <2300mg, moderate intensity exercise >150 mins/wk and medication compliance  Education:  pathophysiology of pulmonary disease, dangers of smoking, inspiratory muscle training, environmental triggers and traveling with pulmonary disease  Progressing:Patient will abstain from smoking in the next 30 days, Will continue to educate and progress as tolerated , Goals met: lowered resting BP    Plan: Complete abstention from smoking, avoid places with second hand smoke, engage in regular exercise, eliminate salt shaker at the table, use salt substitutes and check labels for sodium content  Readiness to change: Action:  (Changing behavior)

## 2021-09-27 ENCOUNTER — CLINICAL SUPPORT (OUTPATIENT)
Dept: PULMONOLOGY | Facility: CLINIC | Age: 61
End: 2021-09-27
Payer: COMMERCIAL

## 2021-09-27 DIAGNOSIS — J44.9 MODERATE COPD (CHRONIC OBSTRUCTIVE PULMONARY DISEASE) (HCC): ICD-10-CM

## 2021-09-27 PROCEDURE — G0424 PULMONARY REHAB W EXER: HCPCS

## 2021-09-29 ENCOUNTER — CLINICAL SUPPORT (OUTPATIENT)
Dept: PULMONOLOGY | Facility: CLINIC | Age: 61
End: 2021-09-29
Payer: COMMERCIAL

## 2021-09-29 DIAGNOSIS — J44.9 MODERATE COPD (CHRONIC OBSTRUCTIVE PULMONARY DISEASE) (HCC): ICD-10-CM

## 2021-09-29 PROCEDURE — G0424 PULMONARY REHAB W EXER: HCPCS

## 2021-10-01 ENCOUNTER — VBI (OUTPATIENT)
Dept: ADMINISTRATIVE | Facility: OTHER | Age: 61
End: 2021-10-01

## 2021-10-01 DIAGNOSIS — I25.10 ATHEROSCLEROSIS OF CORONARY ARTERY OF NATIVE HEART WITHOUT ANGINA PECTORIS, UNSPECIFIED VESSEL OR LESION TYPE: Chronic | ICD-10-CM

## 2021-10-01 RX ORDER — ROSUVASTATIN CALCIUM 20 MG/1
TABLET, COATED ORAL
Qty: 90 TABLET | Refills: 3 | Status: SHIPPED | OUTPATIENT
Start: 2021-10-01 | End: 2022-05-02

## 2021-10-04 ENCOUNTER — CLINICAL SUPPORT (OUTPATIENT)
Dept: PULMONOLOGY | Facility: CLINIC | Age: 61
End: 2021-10-04
Payer: COMMERCIAL

## 2021-10-04 DIAGNOSIS — J44.9 MODERATE COPD (CHRONIC OBSTRUCTIVE PULMONARY DISEASE) (HCC): ICD-10-CM

## 2021-10-04 PROCEDURE — G0424 PULMONARY REHAB W EXER: HCPCS

## 2021-10-06 ENCOUNTER — CLINICAL SUPPORT (OUTPATIENT)
Dept: PULMONOLOGY | Facility: CLINIC | Age: 61
End: 2021-10-06
Payer: COMMERCIAL

## 2021-10-06 DIAGNOSIS — J44.9 MODERATE COPD (CHRONIC OBSTRUCTIVE PULMONARY DISEASE) (HCC): ICD-10-CM

## 2021-10-06 PROCEDURE — G0424 PULMONARY REHAB W EXER: HCPCS

## 2021-10-11 ENCOUNTER — CLINICAL SUPPORT (OUTPATIENT)
Dept: PULMONOLOGY | Facility: CLINIC | Age: 61
End: 2021-10-11
Payer: COMMERCIAL

## 2021-10-11 DIAGNOSIS — J44.9 MODERATE COPD (CHRONIC OBSTRUCTIVE PULMONARY DISEASE) (HCC): ICD-10-CM

## 2021-10-11 PROCEDURE — G0424 PULMONARY REHAB W EXER: HCPCS

## 2021-10-13 ENCOUNTER — CLINICAL SUPPORT (OUTPATIENT)
Dept: PULMONOLOGY | Facility: CLINIC | Age: 61
End: 2021-10-13
Payer: COMMERCIAL

## 2021-10-13 DIAGNOSIS — J44.9 MODERATE COPD (CHRONIC OBSTRUCTIVE PULMONARY DISEASE) (HCC): ICD-10-CM

## 2021-10-13 PROCEDURE — G0424 PULMONARY REHAB W EXER: HCPCS

## 2021-10-18 ENCOUNTER — CLINICAL SUPPORT (OUTPATIENT)
Dept: PULMONOLOGY | Facility: CLINIC | Age: 61
End: 2021-10-18
Payer: COMMERCIAL

## 2021-10-18 DIAGNOSIS — J44.9 MODERATE COPD (CHRONIC OBSTRUCTIVE PULMONARY DISEASE) (HCC): ICD-10-CM

## 2021-10-18 PROCEDURE — G0424 PULMONARY REHAB W EXER: HCPCS

## 2021-10-28 DIAGNOSIS — I25.119 CORONARY ARTERY DISEASE INVOLVING NATIVE HEART WITH ANGINA PECTORIS, UNSPECIFIED VESSEL OR LESION TYPE (HCC): ICD-10-CM

## 2021-10-28 RX ORDER — VERAPAMIL HYDROCHLORIDE 240 MG/1
TABLET, FILM COATED, EXTENDED RELEASE ORAL
Qty: 90 TABLET | Refills: 3 | Status: SHIPPED | OUTPATIENT
Start: 2021-10-28

## 2021-10-30 ENCOUNTER — APPOINTMENT (OUTPATIENT)
Dept: LAB | Facility: CLINIC | Age: 61
End: 2021-10-30
Payer: COMMERCIAL

## 2021-10-30 DIAGNOSIS — I25.10 ATHEROSCLEROSIS OF CORONARY ARTERY OF NATIVE HEART WITHOUT ANGINA PECTORIS, UNSPECIFIED VESSEL OR LESION TYPE: Chronic | ICD-10-CM

## 2021-10-30 LAB
ALBUMIN SERPL BCP-MCNC: 3.6 G/DL (ref 3.5–5)
ALP SERPL-CCNC: 118 U/L (ref 46–116)
ALT SERPL W P-5'-P-CCNC: 26 U/L (ref 12–78)
ANION GAP SERPL CALCULATED.3IONS-SCNC: 4 MMOL/L (ref 4–13)
AST SERPL W P-5'-P-CCNC: 17 U/L (ref 5–45)
BILIRUB SERPL-MCNC: 0.6 MG/DL (ref 0.2–1)
BUN SERPL-MCNC: 15 MG/DL (ref 5–25)
CALCIUM SERPL-MCNC: 9.1 MG/DL (ref 8.3–10.1)
CHLORIDE SERPL-SCNC: 106 MMOL/L (ref 100–108)
CHOLEST SERPL-MCNC: 135 MG/DL (ref 50–200)
CO2 SERPL-SCNC: 27 MMOL/L (ref 21–32)
CREAT SERPL-MCNC: 0.85 MG/DL (ref 0.6–1.3)
ERYTHROCYTE [DISTWIDTH] IN BLOOD BY AUTOMATED COUNT: 12.4 % (ref 11.6–15.1)
GFR SERPL CREATININE-BSD FRML MDRD: 74 ML/MIN/1.73SQ M
GLUCOSE P FAST SERPL-MCNC: 117 MG/DL (ref 65–99)
HCT VFR BLD AUTO: 43.8 % (ref 34.8–46.1)
HDLC SERPL-MCNC: 39 MG/DL
HGB BLD-MCNC: 14.4 G/DL (ref 11.5–15.4)
LDLC SERPL CALC-MCNC: 77 MG/DL (ref 0–100)
MCH RBC QN AUTO: 29.9 PG (ref 26.8–34.3)
MCHC RBC AUTO-ENTMCNC: 32.9 G/DL (ref 31.4–37.4)
MCV RBC AUTO: 91 FL (ref 82–98)
PLATELET # BLD AUTO: 179 THOUSANDS/UL (ref 149–390)
PMV BLD AUTO: 13.5 FL (ref 8.9–12.7)
POTASSIUM SERPL-SCNC: 3.6 MMOL/L (ref 3.5–5.3)
PROT SERPL-MCNC: 7.1 G/DL (ref 6.4–8.2)
RBC # BLD AUTO: 4.82 MILLION/UL (ref 3.81–5.12)
SODIUM SERPL-SCNC: 137 MMOL/L (ref 136–145)
TRIGL SERPL-MCNC: 95 MG/DL
TSH SERPL DL<=0.05 MIU/L-ACNC: 1.89 UIU/ML (ref 0.36–3.74)
WBC # BLD AUTO: 6.89 THOUSAND/UL (ref 4.31–10.16)

## 2021-10-30 PROCEDURE — 36415 COLL VENOUS BLD VENIPUNCTURE: CPT

## 2021-10-30 PROCEDURE — 80061 LIPID PANEL: CPT

## 2021-10-30 PROCEDURE — 80053 COMPREHEN METABOLIC PANEL: CPT

## 2021-10-30 PROCEDURE — 84443 ASSAY THYROID STIM HORMONE: CPT

## 2021-10-30 PROCEDURE — 85027 COMPLETE CBC AUTOMATED: CPT

## 2021-11-01 ENCOUNTER — OFFICE VISIT (OUTPATIENT)
Dept: FAMILY MEDICINE CLINIC | Facility: CLINIC | Age: 61
End: 2021-11-01
Payer: COMMERCIAL

## 2021-11-01 VITALS
TEMPERATURE: 97.8 F | OXYGEN SATURATION: 97 % | DIASTOLIC BLOOD PRESSURE: 82 MMHG | WEIGHT: 156 LBS | RESPIRATION RATE: 16 BRPM | HEART RATE: 75 BPM | SYSTOLIC BLOOD PRESSURE: 138 MMHG | BODY MASS INDEX: 28.71 KG/M2 | HEIGHT: 62 IN

## 2021-11-01 DIAGNOSIS — I25.10 ATHEROSCLEROSIS OF CORONARY ARTERY OF NATIVE HEART WITHOUT ANGINA PECTORIS, UNSPECIFIED VESSEL OR LESION TYPE: Primary | Chronic | ICD-10-CM

## 2021-11-01 DIAGNOSIS — K82.4 GALLBLADDER POLYP: ICD-10-CM

## 2021-11-01 DIAGNOSIS — K21.00 GASTROESOPHAGEAL REFLUX DISEASE WITH ESOPHAGITIS, UNSPECIFIED WHETHER HEMORRHAGE: ICD-10-CM

## 2021-11-01 DIAGNOSIS — E78.2 MIXED HYPERLIPIDEMIA: Chronic | ICD-10-CM

## 2021-11-01 DIAGNOSIS — R73.02 IGT (IMPAIRED GLUCOSE TOLERANCE): ICD-10-CM

## 2021-11-01 DIAGNOSIS — H92.01 EARACHE ON RIGHT: ICD-10-CM

## 2021-11-01 DIAGNOSIS — M51.36 DDD (DEGENERATIVE DISC DISEASE), LUMBAR: ICD-10-CM

## 2021-11-01 DIAGNOSIS — K59.04 CHRONIC IDIOPATHIC CONSTIPATION: ICD-10-CM

## 2021-11-01 DIAGNOSIS — J44.9 MODERATE COPD (CHRONIC OBSTRUCTIVE PULMONARY DISEASE) (HCC): ICD-10-CM

## 2021-11-01 DIAGNOSIS — R91.1 SOLITARY PULMONARY NODULE: ICD-10-CM

## 2021-11-01 PROCEDURE — 3725F SCREEN DEPRESSION PERFORMED: CPT | Performed by: FAMILY MEDICINE

## 2021-11-01 PROCEDURE — 99214 OFFICE O/P EST MOD 30 MIN: CPT | Performed by: FAMILY MEDICINE

## 2021-11-01 PROCEDURE — 1036F TOBACCO NON-USER: CPT | Performed by: FAMILY MEDICINE

## 2021-11-01 PROCEDURE — 3008F BODY MASS INDEX DOCD: CPT | Performed by: FAMILY MEDICINE

## 2021-11-01 RX ORDER — METHYLPREDNISOLONE 4 MG/1
TABLET ORAL
Qty: 21 EACH | Refills: 0 | Status: SHIPPED | OUTPATIENT
Start: 2021-11-01 | End: 2021-12-15 | Stop reason: ALTCHOICE

## 2021-11-01 RX ORDER — CYCLOBENZAPRINE HCL 10 MG
10 TABLET ORAL
Qty: 90 TABLET | Refills: 3 | Status: SHIPPED | OUTPATIENT
Start: 2021-11-01 | End: 2022-05-02 | Stop reason: SDUPTHER

## 2021-11-01 RX ORDER — LINACLOTIDE 72 UG/1
72 CAPSULE, GELATIN COATED ORAL DAILY PRN
Qty: 90 CAPSULE | Refills: 1 | Status: SHIPPED | OUTPATIENT
Start: 2021-11-01

## 2021-12-15 ENCOUNTER — TELEMEDICINE (OUTPATIENT)
Dept: FAMILY MEDICINE CLINIC | Facility: CLINIC | Age: 61
End: 2021-12-15
Payer: COMMERCIAL

## 2021-12-15 VITALS — BODY MASS INDEX: 28.71 KG/M2 | HEIGHT: 62 IN | WEIGHT: 156 LBS | TEMPERATURE: 98 F

## 2021-12-15 DIAGNOSIS — J44.9 MODERATE COPD (CHRONIC OBSTRUCTIVE PULMONARY DISEASE) (HCC): ICD-10-CM

## 2021-12-15 DIAGNOSIS — J01.01 ACUTE RECURRENT MAXILLARY SINUSITIS: Primary | ICD-10-CM

## 2021-12-15 PROCEDURE — 1036F TOBACCO NON-USER: CPT | Performed by: FAMILY MEDICINE

## 2021-12-15 PROCEDURE — 99213 OFFICE O/P EST LOW 20 MIN: CPT | Performed by: FAMILY MEDICINE

## 2021-12-15 PROCEDURE — 3008F BODY MASS INDEX DOCD: CPT | Performed by: FAMILY MEDICINE

## 2021-12-15 RX ORDER — ALBUTEROL SULFATE 2.5 MG/3ML
2.5 SOLUTION RESPIRATORY (INHALATION) EVERY 4 HOURS PRN
Qty: 360 ML | Refills: 1 | Status: SHIPPED | OUTPATIENT
Start: 2021-12-15 | End: 2022-03-03

## 2021-12-15 RX ORDER — AMOXICILLIN AND CLAVULANATE POTASSIUM 875; 125 MG/1; MG/1
1 TABLET, FILM COATED ORAL
Qty: 20 TABLET | Refills: 0 | Status: SHIPPED | OUTPATIENT
Start: 2021-12-15 | End: 2021-12-25

## 2021-12-15 RX ORDER — ALBUTEROL SULFATE 90 UG/1
POWDER, METERED RESPIRATORY (INHALATION)
Qty: 1 EACH | Refills: 5 | Status: SHIPPED | OUTPATIENT
Start: 2021-12-15

## 2021-12-15 RX ORDER — GUAIFENESIN AND CODEINE PHOSPHATE 100; 10 MG/5ML; MG/5ML
SOLUTION ORAL
Qty: 180 ML | Refills: 0 | Status: SHIPPED | OUTPATIENT
Start: 2021-12-15 | End: 2022-05-02 | Stop reason: ALTCHOICE

## 2021-12-30 DIAGNOSIS — K21.00 GASTROESOPHAGEAL REFLUX DISEASE WITH ESOPHAGITIS: ICD-10-CM

## 2021-12-30 RX ORDER — PANTOPRAZOLE SODIUM 40 MG/1
TABLET, DELAYED RELEASE ORAL
Qty: 90 TABLET | Refills: 3 | Status: SHIPPED | OUTPATIENT
Start: 2021-12-30

## 2022-01-28 ENCOUNTER — HOSPITAL ENCOUNTER (OUTPATIENT)
Dept: RADIOLOGY | Age: 62
Discharge: HOME | End: 2022-01-28
Payer: COMMERCIAL

## 2022-01-28 VITALS — BODY MASS INDEX: 28.71 KG/M2 | WEIGHT: 156 LBS | HEIGHT: 62 IN

## 2022-01-28 DIAGNOSIS — Z12.31 ENCOUNTER FOR SCREENING MAMMOGRAM FOR MALIGNANT NEOPLASM OF BREAST: ICD-10-CM

## 2022-01-28 PROCEDURE — 77067 SCR MAMMO BI INCL CAD: CPT

## 2022-01-28 PROCEDURE — 77063 BREAST TOMOSYNTHESIS BI: CPT

## 2022-03-03 DIAGNOSIS — J44.9 MODERATE COPD (CHRONIC OBSTRUCTIVE PULMONARY DISEASE) (HCC): ICD-10-CM

## 2022-03-03 RX ORDER — ALBUTEROL SULFATE 2.5 MG/3ML
SOLUTION RESPIRATORY (INHALATION)
Qty: 360 ML | Refills: 5 | Status: SHIPPED | OUTPATIENT
Start: 2022-03-03

## 2022-04-26 DIAGNOSIS — I25.10 ATHEROSCLEROSIS OF CORONARY ARTERY OF NATIVE HEART WITHOUT ANGINA PECTORIS, UNSPECIFIED VESSEL OR LESION TYPE: Chronic | ICD-10-CM

## 2022-04-26 RX ORDER — NITROGLYCERIN 0.4 MG/1
0.4 TABLET SUBLINGUAL
Qty: 25 TABLET | Refills: 1 | Status: SHIPPED | OUTPATIENT
Start: 2022-04-26

## 2022-04-27 ENCOUNTER — APPOINTMENT (OUTPATIENT)
Dept: LAB | Facility: CLINIC | Age: 62
End: 2022-04-27
Payer: COMMERCIAL

## 2022-04-27 DIAGNOSIS — E78.2 MIXED HYPERLIPIDEMIA: Chronic | ICD-10-CM

## 2022-04-27 DIAGNOSIS — R73.02 IGT (IMPAIRED GLUCOSE TOLERANCE): ICD-10-CM

## 2022-04-27 LAB
ALBUMIN SERPL BCP-MCNC: 3.6 G/DL (ref 3.5–5)
ALP SERPL-CCNC: 116 U/L (ref 46–116)
ALT SERPL W P-5'-P-CCNC: 29 U/L (ref 12–78)
ANION GAP SERPL CALCULATED.3IONS-SCNC: 3 MMOL/L (ref 4–13)
AST SERPL W P-5'-P-CCNC: 16 U/L (ref 5–45)
BASOPHILS # BLD AUTO: 0.06 THOUSANDS/ΜL (ref 0–0.1)
BASOPHILS NFR BLD AUTO: 1 % (ref 0–1)
BILIRUB SERPL-MCNC: 0.32 MG/DL (ref 0.2–1)
BUN SERPL-MCNC: 16 MG/DL (ref 5–25)
CALCIUM SERPL-MCNC: 9.1 MG/DL (ref 8.3–10.1)
CHLORIDE SERPL-SCNC: 108 MMOL/L (ref 100–108)
CHOLEST SERPL-MCNC: 148 MG/DL
CO2 SERPL-SCNC: 28 MMOL/L (ref 21–32)
CREAT SERPL-MCNC: 0.95 MG/DL (ref 0.6–1.3)
EOSINOPHIL # BLD AUTO: 0.19 THOUSAND/ΜL (ref 0–0.61)
EOSINOPHIL NFR BLD AUTO: 3 % (ref 0–6)
ERYTHROCYTE [DISTWIDTH] IN BLOOD BY AUTOMATED COUNT: 12.6 % (ref 11.6–15.1)
EST. AVERAGE GLUCOSE BLD GHB EST-MCNC: 120 MG/DL
GFR SERPL CREATININE-BSD FRML MDRD: 64 ML/MIN/1.73SQ M
GLUCOSE P FAST SERPL-MCNC: 128 MG/DL (ref 65–99)
HBA1C MFR BLD: 5.8 %
HCT VFR BLD AUTO: 47.8 % (ref 34.8–46.1)
HDLC SERPL-MCNC: 45 MG/DL
HGB BLD-MCNC: 15.9 G/DL (ref 11.5–15.4)
IMM GRANULOCYTES # BLD AUTO: 0.03 THOUSAND/UL (ref 0–0.2)
IMM GRANULOCYTES NFR BLD AUTO: 0 % (ref 0–2)
LDLC SERPL CALC-MCNC: 78 MG/DL (ref 0–100)
LYMPHOCYTES # BLD AUTO: 1.98 THOUSANDS/ΜL (ref 0.6–4.47)
LYMPHOCYTES NFR BLD AUTO: 26 % (ref 14–44)
MCH RBC QN AUTO: 30.2 PG (ref 26.8–34.3)
MCHC RBC AUTO-ENTMCNC: 33.3 G/DL (ref 31.4–37.4)
MCV RBC AUTO: 91 FL (ref 82–98)
MONOCYTES # BLD AUTO: 0.48 THOUSAND/ΜL (ref 0.17–1.22)
MONOCYTES NFR BLD AUTO: 6 % (ref 4–12)
NEUTROPHILS # BLD AUTO: 4.98 THOUSANDS/ΜL (ref 1.85–7.62)
NEUTS SEG NFR BLD AUTO: 64 % (ref 43–75)
NRBC BLD AUTO-RTO: 0 /100 WBCS
PLATELET # BLD AUTO: 171 THOUSANDS/UL (ref 149–390)
PMV BLD AUTO: 12.8 FL (ref 8.9–12.7)
POTASSIUM SERPL-SCNC: 4.2 MMOL/L (ref 3.5–5.3)
PROT SERPL-MCNC: 7 G/DL (ref 6.4–8.2)
RBC # BLD AUTO: 5.27 MILLION/UL (ref 3.81–5.12)
SODIUM SERPL-SCNC: 139 MMOL/L (ref 136–145)
TRIGL SERPL-MCNC: 126 MG/DL
TSH SERPL DL<=0.05 MIU/L-ACNC: 1.97 UIU/ML (ref 0.45–4.5)
WBC # BLD AUTO: 7.72 THOUSAND/UL (ref 4.31–10.16)

## 2022-04-27 PROCEDURE — 84443 ASSAY THYROID STIM HORMONE: CPT

## 2022-04-27 PROCEDURE — 83036 HEMOGLOBIN GLYCOSYLATED A1C: CPT

## 2022-04-27 PROCEDURE — 80053 COMPREHEN METABOLIC PANEL: CPT

## 2022-04-27 PROCEDURE — 36415 COLL VENOUS BLD VENIPUNCTURE: CPT

## 2022-04-27 PROCEDURE — 85025 COMPLETE CBC W/AUTO DIFF WBC: CPT

## 2022-04-27 PROCEDURE — 80061 LIPID PANEL: CPT

## 2022-05-02 ENCOUNTER — OFFICE VISIT (OUTPATIENT)
Dept: FAMILY MEDICINE CLINIC | Facility: CLINIC | Age: 62
End: 2022-05-02
Payer: COMMERCIAL

## 2022-05-02 VITALS
BODY MASS INDEX: 29.19 KG/M2 | HEIGHT: 62 IN | OXYGEN SATURATION: 94 % | WEIGHT: 158.6 LBS | RESPIRATION RATE: 16 BRPM | HEART RATE: 77 BPM | TEMPERATURE: 97.8 F | DIASTOLIC BLOOD PRESSURE: 82 MMHG | SYSTOLIC BLOOD PRESSURE: 134 MMHG

## 2022-05-02 DIAGNOSIS — M51.36 DDD (DEGENERATIVE DISC DISEASE), LUMBAR: ICD-10-CM

## 2022-05-02 DIAGNOSIS — F41.8 DEPRESSION WITH ANXIETY: ICD-10-CM

## 2022-05-02 DIAGNOSIS — J44.9 MODERATE COPD (CHRONIC OBSTRUCTIVE PULMONARY DISEASE) (HCC): ICD-10-CM

## 2022-05-02 DIAGNOSIS — I25.10 ATHEROSCLEROSIS OF CORONARY ARTERY OF NATIVE HEART WITHOUT ANGINA PECTORIS, UNSPECIFIED VESSEL OR LESION TYPE: Chronic | ICD-10-CM

## 2022-05-02 DIAGNOSIS — Z00.00 ENCOUNTER FOR WELLNESS EXAMINATION IN ADULT: Primary | ICD-10-CM

## 2022-05-02 DIAGNOSIS — G47.00 INSOMNIA, UNSPECIFIED TYPE: ICD-10-CM

## 2022-05-02 PROCEDURE — 3725F SCREEN DEPRESSION PERFORMED: CPT | Performed by: FAMILY MEDICINE

## 2022-05-02 PROCEDURE — 99396 PREV VISIT EST AGE 40-64: CPT | Performed by: FAMILY MEDICINE

## 2022-05-02 PROCEDURE — 1036F TOBACCO NON-USER: CPT | Performed by: FAMILY MEDICINE

## 2022-05-02 PROCEDURE — 3008F BODY MASS INDEX DOCD: CPT | Performed by: FAMILY MEDICINE

## 2022-05-02 RX ORDER — CYCLOBENZAPRINE HCL 5 MG
5 TABLET ORAL DAILY PRN
Qty: 90 TABLET | Refills: 1 | Status: SHIPPED | OUTPATIENT
Start: 2022-05-02

## 2022-05-02 RX ORDER — TRAZODONE HYDROCHLORIDE 50 MG/1
TABLET ORAL
Qty: 30 TABLET | Refills: 1 | Status: SHIPPED | OUTPATIENT
Start: 2022-05-02

## 2022-05-02 RX ORDER — BUPROPION HYDROCHLORIDE 150 MG/1
150 TABLET ORAL EVERY MORNING
Qty: 90 TABLET | Refills: 3 | Status: SHIPPED | OUTPATIENT
Start: 2022-05-02

## 2022-05-02 RX ORDER — ROSUVASTATIN CALCIUM 20 MG/1
20 TABLET, COATED ORAL DAILY
Qty: 90 TABLET | Refills: 3 | Status: SHIPPED | OUTPATIENT
Start: 2022-05-02

## 2022-05-02 NOTE — PROGRESS NOTES
FAMILY PRACTICE OFFICE VISIT       NAME: Pearl Lewis  AGE: 57 y o  SEX: female       : 1960        MRN: [id number]        Assessment and Plan     1  Encounter for wellness examination in adult  Assessment & Plan:  Up-to-date with mammogram, 2022  Last colonoscopy 2017, due in 5 years  Patient declines COVID-19 vaccination  Patient is up-to-date with pneumococcal vaccination      2  Depression with anxiety  Assessment & Plan:  Symptoms are well controlled on low-dose Wellbutrin    Orders:  -     buPROPion (WELLBUTRIN XL) 150 mg 24 hr tablet; Take 1 tablet (150 mg total) by mouth every morning    3  Atherosclerosis of coronary artery of native heart without angina pectoris, unspecified vessel or lesion type  Assessment & Plan:  Chronic symptoms of dyspnea on exertion  Patient denies symptoms of angina per se  Remote history of coronary atherosclerosis  No of recent formal follow-up with Cardiology  Continue aspirin, statin, verapamil  Strongly advised patient to quit tobacco     Patient should schedule follow-up with Cardiology    Orders:  -     rosuvastatin (CRESTOR) 20 MG tablet; Take 1 tablet (20 mg total) by mouth daily  -     Ambulatory Referral to Cardiology; Future    4  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  ([facility] )  Assessment & Plan:  Patient remains under care of [facility]'s pulmonology  I strongly advised her to quit tobacco  Continue Spiriva    Orders:  -     tiotropium (SPIRIVA RESPIMAT) 2 5 MCG/ACT AERS inhaler; Inhale 2 puffs daily    5  Insomnia, unspecified type  -     traZODone (DESYREL) 50 mg tablet; Take 1 or 2 tablets at bedtime as needed for insomnia    6  DDD (degenerative disc disease), lumbar  Assessment & Plan:  Chronic low back pain  Continue p r n  Flexeril and stretching exercises    Orders:  -     cyclobenzaprine (FLEXERIL) 5 mg tablet;  Take 1 tablet (5 mg total) by mouth daily as needed for muscle spasms (back pain)      Annual well exam     Patient has been feeling generally stably  I strongly advised her to quit tobacco     Blood work will be followed on an annual basis  Follow-up with PCP in 6 months  Patient should schedule follow-up with Cardiology  Trial of trazodone for chronic insomnia symptoms  Patient will try antihistamine over-the-counter for symptoms of postnasal drip, she may use either Claritin or Zyrtec  There are no Patient Instructions on file for this visit  Return in about 6 months (around 11/2/2022) for follow up  Discussed with the patient and all questioned fully answered  She will call me if any problems arise  M*UserZoom software was used to dictate this note  It may contain errors with dictating incorrect words/spelling  Please contact provider directly with any questions  Chief Complaint     Chief Complaint   Patient presents with    Follow-up     Routine F/U Care: GERD, COPD, Osteopenia, HLD, Major depressive disorder       History of Present Illness      Patient presents for annual well exam/follow-up  She has been feeling generally stably  Postnasal drip-  - followed by the cough, no fever, seasonal allergies, symptoms started 3 weeks ago   No cough at night, no wheezing or chest tightness    Patient denies symptoms of angina or palpitations    Trouble falling asleep- no anxiety, just insomnia  Takes advil PM - helps somewhat - still takes an hour to fall asleep    UTD with mammo  Pending  CT chest  7/2022  SOB with with exertion-  No chest pains  Patient has not been seen by Cardiology within past few years  last  Paola 11/2017-  due in 11/2022, Brandon Cain    Patient remains on daily medications for hyperlipidemia, GERD, COPD  She is still unfortunately smoking and is well aware of significant risks  Results of recent blood work performed in late April 2022 discussed           Review of Systems   Review of Systems   Constitutional: Negative  HENT: Positive for postnasal drip  Eyes: Negative  Respiratory: Positive for shortness of breath (Chronic dyspnea on exam)  Cardiovascular: Negative  Gastrointestinal: Negative  Endocrine: Negative  Genitourinary: Negative  Musculoskeletal: Positive for back pain ( chronic)  Skin: Negative  Allergic/Immunologic: Positive for environmental allergies  Neurological: Negative  Hematological: Negative  Psychiatric/Behavioral: Positive for sleep disturbance         Active Problem List     Patient Active Problem List   Diagnosis    Coronary atherosclerosis    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  ([facility] [address] )    DDD (degenerative disc disease), lumbar    Major depressive disorder, recurrent (HCC)    Esophageal reflux    Gallbladder polyp    Hyperlipidemia    Low back pain    Myofascial pain syndrome, cervical    Osteopenia    Solitary pulmonary nodule    Vitamin D deficiency    Hoarseness    Vocal cord leukoplakia    Sensorineural hearing loss (SNHL), bilateral    Oropharyngeal dysphagia    Suri's edema of vocal folds    Thrush of mouth and esophagus (HCC)    Cigarette nicotine dependence in remission    Postprocedural pneumothorax    Encounter for wellness examination in adult    Depression with anxiety       Past Medical History:  Past Medical History:   Diagnosis Date    COPD (chronic obstructive pulmonary disease) ([facility] [address] )     GERD (gastroesophageal reflux disease)     Hypercholesterolemia     Iliotibial band tendonitis     Last Assessed: 6/5/2015    Migraine     [facility]     Last Assessed: 5/23/2017    Sciatica     Last Assessed: 11/10/2014    Tobacco abuse 4/19/2018    Vertigo     Last Assessed: 4/12/2017       Past Surgical History:  Past Surgical History:   Procedure Laterality Date    COLONOSCOPY  06/2011    Complete: Dr Manuela Galo - due in 5 years, Colonoscopy Nov 2017, Diverticulosis, 5 mm polyp, Due in 5 years    ESOPHAGOGASTRODUODENOSCOPY      Diagnostic; Last Assessed: 2014    FLEXOR TENDON REPAIR      left finger    HYSTERECTOMY      @ ag[facility] 28    IR CHEST TUBE PLACEMENT  2021    IN LARYNGOSCOPY,DIRECT,DIAGNOSTIC N/A 2019    Procedure: LARYNGOSCOPY DIRECT, FLEXIBLE BRONCHOSCOPY, FLEXIBLE ESOPHAGOSCOPY;  Surgeon: Caryle Pray, MD;  [facility]: [facility];  Service: ENT    SHOULDER ARTHROSCOPY Right     SHOULDER SURGERY      TOTAL ABDOMINAL HYSTERECTOMY W/ BILATERAL SALPINGOOPHORECTOMY      endometriosis;  Last Assessed: 2015       Family History:  Family History   Problem Relation Age of Onset   [facility] Lung cancer Mother 47    Other Father         Dyslipidemia    Diabetes Sister     Hypertension Family     Ovarian cancer Maternal Grandmother 48    No Known Problems Daughter     No Known Problems Maternal Grandfather     Lung cancer Paternal Grandmother     No Known Problems Paternal Grandfather     No Known Problems Sister     No Known Problems Sister     No Known Problems Daughter     No Known Problems Maternal Aunt     No Known Problems Paternal Aunt     No Known Problems Paternal Aunt     No Known Problems Paternal Aunt        Social History:  Social History     Socioeconomic History    Marital status: /Civil Union     Spouse name: Not on file    Number of children: Not on file    Years of education: Not on file    Highest education level: Not on file   Occupational History    Not on file   Tobacco Use    Smoking status: Former Smoker     Packs/day: 0 50     Years: 48 00     Pack years: 24 00     Types: Cigarettes     Start date: 5     Quit date: 2021     Years since quittin 8    Smokeless tobacco: Never Used   Vaping Use    Vaping Use: Former    Quit date: 2021   Substance and Sexual Activity    Alcohol use: No    Drug use: No    Sexual activity: Not on file   Other Topics Concern    Not on file   Social History Narrative    Working full time     Social Determinants of Health     Financial Resource Strain: Not on file   Food Insecurity: Not on file   Transportation Needs: Not on file   Physical Activity: Not on file   Stress: Not on file   Social Connections: Not on file   Intimate Partner Violence: Not on file   Housing Stability: Not on file         Objective     Vitals:    05/02/22 0949   BP: 134/82   BP Location: Left arm   Patient Position: Sitting   Cuff Size: Standard   Pulse: 77   Resp: 16   Temp: 97 8 °F (36 6 °C)   TempSrc: Temporal   SpO2: 94%   Weight: 71 9 kg (158 lb 9 6 oz)   Height: 5' 2" (1 575 m)       Wt Readings from Last 3 Encounters:   05/02/22 71 9 kg (158 lb 9 6 oz)   01/28/22 70 8 kg (156 lb)   12/15/21 70 8 kg (156 lb)       Physical Exam  Vitals and nursing note reviewed  Constitutional:       General: She is not in acute distress  Appearance: Normal appearance  She is not ill-appearing  HENT:      Head: Normocephalic and atraumatic  Right Ear: Tympanic membrane normal       Left Ear: Tympanic membrane normal       Nose: Congestion present  Mouth/Throat:      Pharynx: Posterior oropharyngeal erythema (Postnasal drip) present  Comments: Tonsils  Gr 2-  erythema  Eyes:      Conjunctiva/sclera: Conjunctivae normal    Cardiovascular:      Rate and Rhythm: Normal rate and regular rhythm  Heart sounds: Normal heart sounds  No murmur heard  Pulmonary:      Effort: Pulmonary effort is normal  No respiratory distress  Breath sounds: Normal breath sounds  No wheezing  Abdominal:      General: Bowel sounds are normal  There is no distension  Palpations: Abdomen is soft  Tenderness: There is no abdominal tenderness  Hernia: No hernia is present  Musculoskeletal:      Cervical back: Neck supple  No rigidity  Right lower leg: No edema  Left lower leg: No edema  Lymphadenopathy:      Cervical: No cervical adenopathy  Skin:     General: Skin is warm        Findings: No rash  Neurological:      General: No focal deficit present  Mental Status: She is alert and oriented to person, place, and time  Psychiatric:         Mood and Affect: Mood normal          Behavior: Behavior normal          Thought Content:  Thought content normal           Pertinent Laboratory/Diagnostic Studies:    Lab Results   Component Value Date    WBC 7 72 04/27/2022    HGB 15 9 (H) 04/27/2022    HCT 47 8 (H) 04/27/2022    MCV 91 04/27/2022     04/27/2022       Lab Results   Component Value Date    TSH 2 25 09/17/2020       Lab Results   Component Value Date    CHOL 163 10/31/2017     Lab Results   Component Value Date    TRIG 126 04/27/2022     Lab Results   Component Value Date    HDL 45 (L) 04/27/2022     Lab Results   Component Value Date    LDLCALC 78 04/27/2022     Lab Results   Component Value Date    HGBA1C 5 8 (H) 04/27/2022     Lab Results   Component Value Date    SODIUM 139 04/27/2022    K 4 2 04/27/2022     04/27/2022    CO2 28 04/27/2022    AGAP 3 (L) 04/27/2022    BUN 16 04/27/2022    CREATININE 0 95 04/27/2022    GLUC 82 07/10/2021    GLUF 128 (H) 04/27/2022    CALCIUM 9 1 04/27/2022    AST 16 04/27/2022    ALT 29 04/27/2022    ALKPHOS 116 04/27/2022    PROT 7 0 01/18/2018    TP 7 0 04/27/2022    BILITOT 0 4 01/18/2018    TBILI 0 32 04/27/2022    EGFR 64 04/27/2022       Orders Placed This Encounter   Procedures    Ambulatory Referral to Cardiology       ALLERGIES:  Allergies   Allergen Reactions    Darvon [Propoxyphene] Itching       Current Medications     Current Outpatient Medications   Medication Sig Dispense Refill    albuterol (2 5 mg/3 mL) 0 083 % nebulizer solution USE 3 ML (2 5 MG TOTAL) BY NEBULIZATION EVERY 4 HOURS AS NEEDED FOR WHEEZING OR SHORTNESS OF BREATH 360 mL 5    Albuterol Sulfate (ProAir RespiClick) 496 (90 Base) MCG/ACT AEPB Two puffs every 4-6 hours as needed for cough/wheeze/shortness of breath 1 each 5    aspirin (ECOTRIN LOW STRENGTH) 81 mg EC tablet Take 1 tablet by mouth daily      buPROPion (WELLBUTRIN XL) 150 mg 24 hr tablet Take 1 tablet (150 mg total) by mouth every morning 90 tablet 3    cyclobenzaprine (FLEXERIL) 5 mg tablet Take 1 tablet (5 mg total) by mouth daily as needed for muscle spasms (back pain) 90 tablet 1    linaCLOtide (Linzess) 72 MCG CAPS Take 72 mcg by mouth daily as needed (Constipation) 90 capsule 1    Multiple Vitamins-Minerals (ZINC PO) Take 1 tablet by mouth daily Zinc w/ vitamin C      pantoprazole (PROTONIX) 40 mg tablet TAKE 1 TABLET BY MOUTH EVERY DAY 90 tablet 3    rosuvastatin (CRESTOR) 20 MG tablet Take 1 tablet (20 mg total) by mouth daily 90 tablet 3    verapamil (CALAN-SR) 240 mg CR tablet TAKE 1 TABLET DAILY 90 tablet 3    nitroglycerin (NITROSTAT) 0 4 mg SL tablet PLACE 1 TABLET (0 4 MG TOTAL) UNDER THE TONGUE EVERY 5 (FIVE) MINUTES AS NEEDED FOR CHEST PAIN (Patient not taking: Reported on 5/2/2022 ) 25 tablet 1    POTASSIUM CHLORIDE PO Take 1 caplet by mouth daily (Patient not taking: Reported on 5/2/2022 )      tiotropium (SPIRIVA RESPIMAT) 2 5 MCG/ACT AERS inhaler Inhale 2 puffs daily 12 g 3    traZODone (DESYREL) 50 mg tablet Take 1 or 2 tablets at bedtime as needed for insomnia 30 tablet 1     No current facility-administered medications for this visit         Medications Discontinued During This Encounter   Medication Reason    guaifenesin-codeine (GUAIFENESIN AC) 100-10 MG/5ML liquid Therapy completed    buPROPion (WELLBUTRIN XL) 300 mg 24 hr tablet     rosuvastatin (CRESTOR) 20 MG tablet     tiotropium (SPIRIVA RESPIMAT) 2 5 MCG/ACT AERS inhaler     cyclobenzaprine (FLEXERIL) 10 mg tablet Reorder       Health Maintenance     Health Maintenance   Topic Date Due    HIV Screening  Never done    DTaP,Tdap,and Td Vaccines (1 - Tdap) Never done    BMI: Followup Plan  04/26/2022    Lung Cancer Screening  06/19/2022    COVID-19 Vaccine (1) 08/02/2022 (Originally 5/17/1965)    Influenza Vaccine (Season Ended) 09/01/2022    Colorectal Cancer Screening  11/30/2022    BMI: Adult  05/02/2023    Annual Physical  05/02/2023    Depression Remission PHQ  05/02/2023    Breast Cancer Screening: Mammogram  01/28/2024    Pneumococcal Vaccine: Pediatrics (0 to 5 Years) and At-Risk Patients (6 to 59 Years) (2 of 2 - PPSV23) 05/17/2024    Hepatitis C Screening  Completed    HIB Vaccine  Aged Out    Hepatitis B Vaccine  Aged Out    IPV Vaccine  Aged Out    Hepatitis A Vaccine  Aged Out    Meningococcal ACWY Vaccine  Aged Out    HPV Vaccine  Aged Out       Immunization History   Administered Date(s) Administered    INFLUENZA 08/20/2015, 11/02/2017    Influenza Quadrivalent Preservative Free 3 years and older IM 08/17/2016, 11/02/2017    Influenza, recombinant, quadrivalent,injectable, preservative free 10/30/2018, 10/02/2019, 10/26/2020    Influenza, seasonal, injectable 1960, 09/01/2011, 01/24/2013    Pneumococcal Polysaccharide PPV23 11/15/2006, 11/02/2017       Ta Dorado MD

## 2022-05-08 PROBLEM — Z00.00 ENCOUNTER FOR WELLNESS EXAMINATION IN ADULT: Status: ACTIVE | Noted: 2022-05-08

## 2022-05-08 PROBLEM — F41.8 DEPRESSION WITH ANXIETY: Status: ACTIVE | Noted: 2022-05-08

## 2022-05-08 NOTE — ASSESSMENT & PLAN NOTE
Chronic symptoms of dyspnea on exertion  Patient denies symptoms of angina per se  Remote history of coronary atherosclerosis  No of recent formal follow-up with Cardiology  Continue aspirin, statin, verapamil      Strongly advised patient to quit tobacco     Patient should schedule follow-up with Cardiology

## 2022-05-08 NOTE — ASSESSMENT & PLAN NOTE
Patient remains under care of [facility]'s pulmonology  I strongly advised her to quit tobacco  Continue Spiriva

## 2022-05-08 NOTE — ASSESSMENT & PLAN NOTE
· Up-to-date with mammogram, January 2022  · Last colonoscopy November 2017, due in 5 years  · Patient declines COVID-19 vaccination    · Patient is up-to-date with pneumococcal vaccination

## 2022-07-01 ENCOUNTER — HOSPITAL ENCOUNTER (OUTPATIENT)
Dept: CT IMAGING | Facility: HOSPITAL | Age: 62
Discharge: HOME | End: 2022-07-01
Attending: INTERNAL MEDICINE
Payer: COMMERCIAL

## 2022-07-01 DIAGNOSIS — R91.1 SOLITARY PULMONARY NODULE: ICD-10-CM

## 2022-07-01 PROCEDURE — 71250 CT THORAX DX C-: CPT

## 2022-07-01 PROCEDURE — G1004 CDSM NDSC: HCPCS

## 2022-07-02 ENCOUNTER — TELEPHONE (OUTPATIENT)
Dept: FAMILY MEDICINE CLINIC | Facility: CLINIC | Age: 62
End: 2022-07-02

## 2022-07-02 DIAGNOSIS — J44.9 MODERATE COPD (CHRONIC OBSTRUCTIVE PULMONARY DISEASE) (HCC): ICD-10-CM

## 2022-07-02 NOTE — TELEPHONE ENCOUNTER
----- Message from Kiran Riojas sent at 7/1/2022 11:13 AM EDT -----  Regarding: FW: spiriva    ----- Message -----  From: Suzan Bah  Sent: 7/1/2022  10:33 AM EDT  To: Tea Menezes [facility]  Subject: Russ Allen  would you send a script for a month supply of spiriva, the cost for a three month supply is $1,400,00 and the cost for a one month is $450 00 I cant afford the three month supply   thank you,

## 2022-07-08 DIAGNOSIS — J44.9 MODERATE COPD (CHRONIC OBSTRUCTIVE PULMONARY DISEASE) (HCC): ICD-10-CM

## 2022-07-13 NOTE — RESULT ENCOUNTER NOTE
The patient's CT scan shows that nodules have not grown in size since most recent imaging  She will need repeat CT scan at 1 year  Please call them and let them know    Please schedule routine office visit for us to follow-up - will schedule follow-up imaging at that time

## 2022-07-14 ENCOUNTER — TELEPHONE (OUTPATIENT)
Dept: PULMONOLOGY | Facility: CLINIC | Age: 62
End: 2022-07-14

## 2022-07-14 NOTE — TELEPHONE ENCOUNTER
Called pt and left a VM informing her of Dr Cortés Homes message  Requested a call back to schedule follow up with Dr Adams [place]

## 2022-07-14 NOTE — TELEPHONE ENCOUNTER
----- Message from Harris Brennan DO sent at 7/13/2022  1:00 PM EDT -----  The patient's CT scan shows that nodules have not grown in size since most recent imaging  She will need repeat CT scan at 1 year  Please call them and let them know    Please schedule routine office visit for us to follow-up - will schedule follow-up imaging at that time

## 2022-08-02 ENCOUNTER — TELEPHONE (OUTPATIENT)
Dept: PULMONOLOGY | Facility: CLINIC | Age: 62
End: 2022-08-02

## 2022-10-16 DIAGNOSIS — I25.10 ATHEROSCLEROSIS OF CORONARY ARTERY OF NATIVE HEART WITHOUT ANGINA PECTORIS, UNSPECIFIED VESSEL OR LESION TYPE: Chronic | ICD-10-CM

## 2022-10-16 RX ORDER — ROSUVASTATIN CALCIUM 20 MG/1
TABLET, COATED ORAL
Qty: 90 TABLET | Refills: 1 | Status: SHIPPED | OUTPATIENT
Start: 2022-10-16

## 2022-10-26 ENCOUNTER — RA CDI HCC (OUTPATIENT)
Dept: OTHER | Facility: HOSPITAL | Age: 62
End: 2022-10-26

## 2022-10-26 NOTE — PROGRESS NOTES
[facility]  coding opportunities          Chart Reviewed number of suggestions sent to Provider: 1   [phone]  Patients Insurance        Commercial Insurance: [address] [place] [address]

## 2022-11-02 ENCOUNTER — OFFICE VISIT (OUTPATIENT)
Dept: FAMILY MEDICINE CLINIC | Facility: CLINIC | Age: 62
End: 2022-11-02

## 2022-11-02 VITALS
OXYGEN SATURATION: 97 % | SYSTOLIC BLOOD PRESSURE: 120 MMHG | HEIGHT: 62 IN | RESPIRATION RATE: 18 BRPM | HEART RATE: 77 BPM | TEMPERATURE: 97.5 F | DIASTOLIC BLOOD PRESSURE: 80 MMHG | BODY MASS INDEX: 28.89 KG/M2 | WEIGHT: 157 LBS

## 2022-11-02 DIAGNOSIS — R73.02 IGT (IMPAIRED GLUCOSE TOLERANCE): ICD-10-CM

## 2022-11-02 DIAGNOSIS — J01.11 ACUTE RECURRENT FRONTAL SINUSITIS: ICD-10-CM

## 2022-11-02 DIAGNOSIS — F33.41 RECURRENT MAJOR DEPRESSIVE DISORDER, IN PARTIAL REMISSION (HCC): ICD-10-CM

## 2022-11-02 DIAGNOSIS — I25.119 CORONARY ARTERY DISEASE INVOLVING NATIVE HEART WITH ANGINA PECTORIS, UNSPECIFIED VESSEL OR LESION TYPE (HCC): ICD-10-CM

## 2022-11-02 DIAGNOSIS — J44.9 MODERATE COPD (CHRONIC OBSTRUCTIVE PULMONARY DISEASE) (HCC): ICD-10-CM

## 2022-11-02 DIAGNOSIS — R91.1 SOLITARY PULMONARY NODULE: Primary | ICD-10-CM

## 2022-11-02 DIAGNOSIS — E78.2 MIXED HYPERLIPIDEMIA: Chronic | ICD-10-CM

## 2022-11-02 RX ORDER — ALBUTEROL SULFATE 2.5 MG/3ML
2.5 SOLUTION RESPIRATORY (INHALATION) EVERY 4 HOURS PRN
Qty: 360 ML | Refills: 5 | Status: SHIPPED | OUTPATIENT
Start: 2022-11-02

## 2022-11-02 RX ORDER — AMOXICILLIN 875 MG/1
875 TABLET, COATED ORAL 2 TIMES DAILY
Qty: 20 TABLET | Refills: 0 | Status: SHIPPED | OUTPATIENT
Start: 2022-11-02 | End: 2022-11-12

## 2022-11-02 RX ORDER — VERAPAMIL HYDROCHLORIDE 240 MG/1
240 TABLET, FILM COATED, EXTENDED RELEASE ORAL DAILY
Qty: 90 TABLET | Refills: 3 | Status: SHIPPED | OUTPATIENT
Start: 2022-11-02

## 2022-11-02 NOTE — ASSESSMENT & PLAN NOTE
IMPRESSION:     9 x 9 mm left upper lobe nodule is stable since April 2021, but has demonstrated slow growth since October 2017  There is also a 6 mm groundglass nodular opacity in the posterior left upper lobe which is stable since the 2021 exams, but does show slight growth since April 2019   Suggested 12 month follow-up to ensure longer-term stability

## 2022-11-02 NOTE — PROGRESS NOTES
Name: Heidi Tamayo      : 1960      MRN: [id number]  Encounter Provider: Kathrin Farmer MD  Encounter Date: 2022   Encounter department: [address] [place] [address]     1  Solitary pulmonary nodule  Assessment & Plan:  IMPRESSION:     9 x 9 mm left upper lobe nodule is stable since 2021, but has demonstrated slow growth since 2017  There is also a 6 mm groundglass nodular opacity in the posterior left upper lobe which is stable since the  exams, but does show slight growth since 2019  Suggested 12 month follow-up to ensure longer-term stability        Orders:  -     CT chest wo contrast; Future; Expected date: 2023    2  Coronary artery disease involving native heart with angina pectoris, unspecified vessel or lesion type (HCC)  -     verapamil (CALAN-SR) 240 mg CR tablet; Take 1 tablet (240 mg total) by mouth daily    3  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)  Assessment & Plan:  Continue Spiriva  Strongly advised patient to quit tobacco         Orders:  -     albuterol (2 5 mg/3 mL) 0 083 % nebulizer solution; Take 3 mL (2 5 mg total) by nebulization every 4 (four) hours as needed for wheezing or shortness of breath    4  Mixed hyperlipidemia  -     CBC and differential; Future; Expected date: 2023  -     Comprehensive metabolic panel; Future; Expected date: 2023  -     Lipid Panel with Direct LDL reflex; Future; Expected date: 2023  -     TSH, 3rd generation; Future; Expected date: 2023    5  Recurrent major depressive disorder, in partial remission ([facility] )  Assessment & Plan:  Symptoms are well controlled, continue Wellbutrin  mg daily      6  IGT (impaired glucose tolerance)  -     Hemoglobin A1C; Future; Expected date: 2023    7  Acute recurrent frontal sinusitis  -     amoxicillin (AMOXIL) 875 mg tablet;  Take 1 tablet (875 mg total) by mouth 2 (two) times a day for 10 days         Patient declines flu vaccine today  Starting [facility]  Start amoxicillin for 10 days for sinusitis along with Zyrtec 10 mg daily  If symptoms of sinusitis are not improving in 4 to 5 days-patient will contact me and I will forward prescription for Medrol Dosepak  Return in about 6 months (around 5/2/2023) for follow up  Subjective      Follow up chronic medical conditions  Patient has been feeling generally well  Chronic back spasms- takes  Flexeril 5 mg daily   Patient will try Magnesium oxide OTC    COPD: She remains on Spiriva daily  Reviewed results of recent CT chest 7/2022, pulmonology felt that study stable in recommended follow-up in 1 year  New  Quit tobacco  temporarily- now started again  Patient is highly motivated to quit and is aware health risks  Patient has been experiencing symptoms of sinus congestion, facial pressure, PND, cough, occ  clear mucus , no fever  She is complaining of frontal and retro-orbital pressure  On Claritin 10 mg daily    Patient remains on daily medications for hypertension, hyperlipidemia, GERD  Most recent blood work has been stable in April of 2022  Review of Systems   Constitutional: Negative  HENT: Positive for congestion, postnasal drip and sore throat  Respiratory: Positive for cough  Negative for chest tightness, shortness of breath and wheezing  Cardiovascular: Negative  Gastrointestinal: Negative  Genitourinary: Negative  Musculoskeletal: Negative  Neurological: Negative  Hematological: Negative  Psychiatric/Behavioral: Negative          Past Medical History:   Diagnosis Date   • COPD (chronic obstructive pulmonary disease) ([facility] [address] [address] )    • GERD (gastroesophageal reflux disease)    • Hypercholesterolemia    • Iliotibial band tendonitis     Last Assessed: 6/5/2015   • Migraine    • Prinzmetal angina ([facility] [address] )     Last Assessed: 5/23/2017   • Sciatica     Last Assessed: 11/10/2014   • Tobacco abuse 2018   • Vertigo     Last Assessed: 2017     Past Surgical History:   Procedure Laterality Date   • COLONOSCOPY  2011    Complete: Dr Luis Bone - due in 5 years, Colonoscopy 2017, Diverticulosis, 5 mm polyp, Due in 5 years   • ESOPHAGOGASTRODUODENOSCOPY      Diagnostic; Last Assessed: 2014   • FLEXOR TENDON REPAIR      left finger   • HYSTERECTOMY      @ agr 28   • IR CHEST TUBE PLACEMENT  2021   • [place] LARYNGOSCOPY,DIRECT,DIAGNOSTIC N/A 2019    Procedure: LARYNGOSCOPY DIRECT, FLEXIBLE BRONCHOSCOPY, FLEXIBLE ESOPHAGOSCOPY;  Surgeon: Hugo Villanueva MD;  [facility];  Service: ENT   • SHOULDER ARTHROSCOPY Right    • SHOULDER SURGERY     • TOTAL ABDOMINAL HYSTERECTOMY W/ BILATERAL SALPINGOOPHORECTOMY      endometriosis;  Last Assessed: 2015     Family History   Problem Relation Age of Onset   • Lung cancer Mother 47   • Other Father         Dyslipidemia   • Diabetes Sister    • Hypertension Family    • Ovarian cancer Maternal Grandmother 48   • No Known Problems Daughter    • No Known Problems Maternal Grandfather    • Lung cancer Paternal Grandmother    • No Known Problems Paternal Grandfather    • No Known Problems Sister    • No Known Problems Sister    • No Known Problems Daughter    • No Known Problems Maternal Aunt    • No Known Problems Paternal Aunt    • No Known Problems Paternal Aunt    • No Known Problems Paternal Aunt      Social History     Socioeconomic History   • Marital status: /Civil Union     Spouse name: None   • Number of children: None   • Years of education: None   • Highest education level: None   Occupational History   • None   Tobacco Use   • Smoking status: Former Smoker     Packs/day: 0 50     Years: 48 00     Pack years: 24 00     Types: Cigarettes     Start date: 5     Quit date: 2021     Years since quittin 3   • Smokeless tobacco: Never Used   Vaping Use   • Vaping Use: Former   • Quit date: 2021   Substance and Sexual Activity   • Alcohol use: No   • Drug use: No   • Sexual activity: None   Other Topics Concern   • None   Social History Narrative    Working full time     Social Determinants of Health     Financial Resource Strain: Not on file   Food Insecurity: Not on file   Transportation Needs: Not on file   Physical Activity: Not on file   Stress: Not on file   Social Connections: Not on file   Intimate Partner Violence: Not on file   Housing Stability: Not on file     Current Outpatient Medications on File Prior to Visit   Medication Sig   • Albuterol Sulfate (ProAir RespiClick) 073 (90 Base) MCG/ACT AEPB Two puffs every 4-6 hours as needed for cough/wheeze/shortness of breath   • aspirin (ECOTRIN LOW STRENGTH) 81 mg EC tablet Take 1 tablet by mouth daily   • buPROPion (WELLBUTRIN XL) 150 mg 24 hr tablet Take 1 tablet (150 mg total) by mouth every morning   • cyclobenzaprine (FLEXERIL) 5 mg tablet Take 1 tablet (5 mg total) by mouth daily as needed for muscle spasms (back pain)   • linaCLOtide (Linzess) 72 MCG CAPS Take 72 mcg by mouth daily as needed (Constipation)   • Multiple Vitamins-Minerals (ZINC PO) Take 1 tablet by mouth daily Zinc w/ vitamin C   • pantoprazole (PROTONIX) 40 mg tablet TAKE 1 TABLET BY MOUTH EVERY DAY   • rosuvastatin (CRESTOR) 20 MG tablet TAKE ONE TABLET BY MOUTH EVERY DAY   • nitroglycerin (NITROSTAT) 0 4 mg SL tablet PLACE 1 TABLET (0 4 MG TOTAL) UNDER THE TONGUE EVERY 5 (FIVE) MINUTES AS NEEDED FOR CHEST PAIN (Patient not taking: No sig reported)   • tiotropium (SPIRIVA RESPIMAT) 2 5 MCG/ACT AERS inhaler Inhale 2 puffs daily   • traZODone (DESYREL) 50 mg tablet Take 1 or 2 tablets at bedtime as needed for insomnia     Allergies   Allergen Reactions   • Darvon [Propoxyphene] Itching     Immunization History   Administered Date(s) Administered   • INFLUENZA 08/20/2015, 11/02/2017   • Influenza Quadrivalent Preservative Free 3 years and older IM 08/17/2016, 11/02/2017   • Influenza, recombinant, quadrivalent,injectable, preservative free 10/30/2018, 10/02/2019, 10/26/2020   • Influenza, seasonal, injectable 1960, 09/01/2011, 01/24/2013   • Pneumococcal Polysaccharide PPV23 11/15/2006, 11/02/2017       Objective     /80   Pulse 77   Temp 97 5 °F (36 4 °C)   Resp 18   Ht 5' 2" (1 575 m)   Wt 71 2 kg (157 lb)   SpO2 97%   BMI 28 72 kg/m²     Physical Exam  Vitals and nursing note reviewed  Constitutional:       General: She is not in acute distress  Appearance: Normal appearance  She is well-developed  She is not ill-appearing  HENT:      Head: Normocephalic and atraumatic  Right Ear: Tympanic membrane and ear canal normal       Left Ear: Tympanic membrane and ear canal normal       Nose: Congestion present  Mouth/Throat:      Mouth: Mucous membranes are moist       Pharynx: Posterior oropharyngeal erythema present  Eyes:      Conjunctiva/sclera: Conjunctivae normal    Neck:      Thyroid: No thyromegaly  Vascular: No carotid bruit  Cardiovascular:      Rate and Rhythm: Normal rate and regular rhythm  Heart sounds: Normal heart sounds  No murmur heard  Pulmonary:      Effort: Pulmonary effort is normal  No respiratory distress  Breath sounds: Normal breath sounds  No wheezing  Abdominal:      General: There is no abdominal bruit  Musculoskeletal:         General: Normal range of motion  Cervical back: Neck supple  No rigidity  Skin:     Comments: SK  Lesion 7-8 mm, smooth  Well circumcized   Neurological:      General: No focal deficit present  Mental Status: She is alert and oriented to person, place, and time  Cranial Nerves: No cranial nerve deficit  Coordination: Coordination normal    Psychiatric:         Mood and Affect: Mood normal          Behavior: Behavior normal          Thought Content:  Thought content normal        Alisha Bain MD

## 2022-12-08 ENCOUNTER — OFFICE VISIT (OUTPATIENT)
Dept: FAMILY MEDICINE CLINIC | Facility: CLINIC | Age: 62
End: 2022-12-08

## 2022-12-08 VITALS
BODY MASS INDEX: 29.26 KG/M2 | SYSTOLIC BLOOD PRESSURE: 140 MMHG | OXYGEN SATURATION: 94 % | DIASTOLIC BLOOD PRESSURE: 80 MMHG | HEIGHT: 62 IN | RESPIRATION RATE: 18 BRPM | TEMPERATURE: 98 F | HEART RATE: 77 BPM | WEIGHT: 159 LBS

## 2022-12-08 DIAGNOSIS — J44.9 MODERATE COPD (CHRONIC OBSTRUCTIVE PULMONARY DISEASE) (HCC): Primary | ICD-10-CM

## 2022-12-08 RX ORDER — CEFUROXIME AXETIL 500 MG/1
500 TABLET ORAL 2 TIMES DAILY
Qty: 14 TABLET | Refills: 0 | Status: SHIPPED | OUTPATIENT
Start: 2022-12-08 | End: 2022-12-15

## 2022-12-08 RX ORDER — PREDNISONE 10 MG/1
TABLET ORAL
Qty: 20 TABLET | Refills: 0 | Status: SHIPPED | OUTPATIENT
Start: 2022-12-08

## 2023-01-14 DIAGNOSIS — K21.00 GASTROESOPHAGEAL REFLUX DISEASE WITH ESOPHAGITIS: ICD-10-CM

## 2023-01-14 RX ORDER — PANTOPRAZOLE SODIUM 40 MG/1
TABLET, DELAYED RELEASE ORAL
Qty: 90 TABLET | Refills: 2 | Status: SHIPPED | OUTPATIENT
Start: 2023-01-14

## 2023-02-14 DIAGNOSIS — M51.36 DDD (DEGENERATIVE DISC DISEASE), LUMBAR: ICD-10-CM

## 2023-02-15 RX ORDER — CYCLOBENZAPRINE HCL 5 MG
TABLET ORAL
Qty: 90 TABLET | Refills: 3 | Status: SHIPPED | OUTPATIENT
Start: 2023-02-15

## 2023-02-22 ENCOUNTER — TELEPHONE (OUTPATIENT)
Dept: FAMILY MEDICINE CLINIC | Facility: CLINIC | Age: 63
End: 2023-02-22

## 2023-02-22 DIAGNOSIS — J44.9 MODERATE COPD (CHRONIC OBSTRUCTIVE PULMONARY DISEASE) (HCC): Primary | ICD-10-CM

## 2023-02-22 NOTE — TELEPHONE ENCOUNTER
Patient called and stated that she went to  her spiriva and with a discount card it is $399 a month  Is there something else that she can try?   Please call to advise

## 2023-02-23 RX ORDER — IPRATROPIUM BROMIDE 17 UG/1
2 AEROSOL, METERED RESPIRATORY (INHALATION) 3 TIMES DAILY
Qty: 12.9 G | Refills: 5 | Status: SHIPPED | OUTPATIENT
Start: 2023-02-23 | End: 2023-02-24 | Stop reason: ALTCHOICE

## 2023-02-23 NOTE — TELEPHONE ENCOUNTER
I will try Atrovent inhaler  It is essentially short acting Spiriva  Patient can use 2 puffs 3 times a day as needed  She can combine it with albuterol inhaler if needed    Thank you

## 2023-02-24 ENCOUNTER — TELEPHONE (OUTPATIENT)
Dept: FAMILY MEDICINE CLINIC | Facility: CLINIC | Age: 63
End: 2023-02-24

## 2023-02-24 DIAGNOSIS — J44.9 CHRONIC OBSTRUCTIVE PULMONARY DISEASE, UNSPECIFIED COPD TYPE (HCC): Primary | ICD-10-CM

## 2023-02-24 RX ORDER — FLUTICASONE PROPIONATE AND SALMETEROL 250; 50 UG/1; UG/1
1 POWDER RESPIRATORY (INHALATION) 2 TIMES DAILY
Qty: 180 BLISTER | Refills: 1 | Status: SHIPPED | OUTPATIENT
Start: 2023-02-24

## 2023-02-24 NOTE — TELEPHONE ENCOUNTER
Please let patient know this inhaler is not the same type of medication as Spirva or Atrovent, but if it is the only one she can afford, it is worth giving it a try  Wixela 1 puff twice daily, rinse mouth out after each use  Prescription was sent  Please keep in contact with Dr Sade Zelaya regarding effectiveness of inhaler

## 2023-02-24 NOTE — TELEPHONE ENCOUNTER
Patient called and stated that the atrovent is still over $400 and would like to know if generic advair can be sent to [facility] in [place]  With [facility] it will cost her $100  Which is more affordable    Please call to advise

## 2023-04-10 PROBLEM — R39.9 UTI SYMPTOMS: Status: ACTIVE | Noted: 2023-04-10

## 2023-04-25 ENCOUNTER — RA CDI HCC (OUTPATIENT)
Dept: OTHER | Facility: HOSPITAL | Age: 63
End: 2023-04-25

## 2023-04-25 NOTE — PROGRESS NOTES
[facility]  coding opportunities          Chart Reviewed number of suggestions sent to Provider: 1   [id number]    This is a reminder to address ALL HCC (risk adjustment) codes as found on active problem list for 2023 as patient scores reset to zero ANNE MARIE      Patients Insurance        Commercial Insurance: [address] [place] [address]

## 2023-04-27 ENCOUNTER — APPOINTMENT (OUTPATIENT)
Dept: LAB | Facility: CLINIC | Age: 63
End: 2023-04-27

## 2023-04-27 DIAGNOSIS — R73.02 IGT (IMPAIRED GLUCOSE TOLERANCE): ICD-10-CM

## 2023-04-27 DIAGNOSIS — E78.2 MIXED HYPERLIPIDEMIA: Chronic | ICD-10-CM

## 2023-04-27 LAB
ALBUMIN SERPL BCP-MCNC: 3.5 G/DL (ref 3.5–5)
ALP SERPL-CCNC: 116 U/L (ref 46–116)
ALT SERPL W P-5'-P-CCNC: 32 U/L (ref 12–78)
ANION GAP SERPL CALCULATED.3IONS-SCNC: 2 MMOL/L (ref 4–13)
AST SERPL W P-5'-P-CCNC: 23 U/L (ref 5–45)
BASOPHILS # BLD AUTO: 0.06 THOUSANDS/ΜL (ref 0–0.1)
BASOPHILS NFR BLD AUTO: 1 % (ref 0–1)
BILIRUB SERPL-MCNC: 0.29 MG/DL (ref 0.2–1)
BUN SERPL-MCNC: 13 MG/DL (ref 5–25)
CALCIUM SERPL-MCNC: 8.7 MG/DL (ref 8.3–10.1)
CHLORIDE SERPL-SCNC: 108 MMOL/L (ref 96–108)
CHOLEST SERPL-MCNC: 133 MG/DL
CO2 SERPL-SCNC: 28 MMOL/L (ref 21–32)
CREAT SERPL-MCNC: 0.78 MG/DL (ref 0.6–1.3)
EOSINOPHIL # BLD AUTO: 0.18 THOUSAND/ΜL (ref 0–0.61)
EOSINOPHIL NFR BLD AUTO: 3 % (ref 0–6)
ERYTHROCYTE [DISTWIDTH] IN BLOOD BY AUTOMATED COUNT: 13 % (ref 11.6–15.1)
EST. AVERAGE GLUCOSE BLD GHB EST-MCNC: 123 MG/DL
GFR SERPL CREATININE-BSD FRML MDRD: 81 ML/MIN/1.73SQ M
GLUCOSE P FAST SERPL-MCNC: 109 MG/DL (ref 65–99)
HBA1C MFR BLD: 5.9 %
HCT VFR BLD AUTO: 47.2 % (ref 34.8–46.1)
HDLC SERPL-MCNC: 46 MG/DL
HGB BLD-MCNC: 15.6 G/DL (ref 11.5–15.4)
IMM GRANULOCYTES # BLD AUTO: 0.02 THOUSAND/UL (ref 0–0.2)
IMM GRANULOCYTES NFR BLD AUTO: 0 % (ref 0–2)
LDLC SERPL CALC-MCNC: 62 MG/DL (ref 0–100)
LYMPHOCYTES # BLD AUTO: 2.11 THOUSANDS/ΜL (ref 0.6–4.47)
LYMPHOCYTES NFR BLD AUTO: 29 % (ref 14–44)
MCH RBC QN AUTO: 29.9 PG (ref 26.8–34.3)
MCHC RBC AUTO-ENTMCNC: 33.1 G/DL (ref 31.4–37.4)
MCV RBC AUTO: 91 FL (ref 82–98)
MONOCYTES # BLD AUTO: 0.54 THOUSAND/ΜL (ref 0.17–1.22)
MONOCYTES NFR BLD AUTO: 7 % (ref 4–12)
NEUTROPHILS # BLD AUTO: 4.38 THOUSANDS/ΜL (ref 1.85–7.62)
NEUTS SEG NFR BLD AUTO: 60 % (ref 43–75)
NRBC BLD AUTO-RTO: 0 /100 WBCS
PLATELET # BLD AUTO: 196 THOUSANDS/UL (ref 149–390)
PMV BLD AUTO: 13 FL (ref 8.9–12.7)
POTASSIUM SERPL-SCNC: 3.9 MMOL/L (ref 3.5–5.3)
PROT SERPL-MCNC: 6.9 G/DL (ref 6.4–8.4)
RBC # BLD AUTO: 5.21 MILLION/UL (ref 3.81–5.12)
SODIUM SERPL-SCNC: 138 MMOL/L (ref 135–147)
TRIGL SERPL-MCNC: 124 MG/DL
TSH SERPL DL<=0.05 MIU/L-ACNC: 2.49 UIU/ML (ref 0.45–4.5)
WBC # BLD AUTO: 7.29 THOUSAND/UL (ref 4.31–10.16)

## 2023-05-02 ENCOUNTER — OFFICE VISIT (OUTPATIENT)
Dept: FAMILY MEDICINE CLINIC | Facility: CLINIC | Age: 63
End: 2023-05-02

## 2023-05-02 VITALS
WEIGHT: 158.8 LBS | DIASTOLIC BLOOD PRESSURE: 67 MMHG | HEIGHT: 62 IN | TEMPERATURE: 98 F | SYSTOLIC BLOOD PRESSURE: 126 MMHG | HEART RATE: 76 BPM | BODY MASS INDEX: 29.22 KG/M2 | RESPIRATION RATE: 16 BRPM | OXYGEN SATURATION: 95 %

## 2023-05-02 DIAGNOSIS — Z12.31 SCREENING MAMMOGRAM FOR BREAST CANCER: ICD-10-CM

## 2023-05-02 DIAGNOSIS — Z12.11 SCREENING FOR COLORECTAL CANCER: ICD-10-CM

## 2023-05-02 DIAGNOSIS — I25.10 ATHEROSCLEROSIS OF CORONARY ARTERY OF NATIVE HEART WITHOUT ANGINA PECTORIS, UNSPECIFIED VESSEL OR LESION TYPE: Chronic | ICD-10-CM

## 2023-05-02 DIAGNOSIS — J44.9 MODERATE COPD (CHRONIC OBSTRUCTIVE PULMONARY DISEASE) (HCC): Primary | ICD-10-CM

## 2023-05-02 DIAGNOSIS — R91.1 SOLITARY PULMONARY NODULE: ICD-10-CM

## 2023-05-02 DIAGNOSIS — R39.9 UTI SYMPTOMS: ICD-10-CM

## 2023-05-02 DIAGNOSIS — Z12.12 SCREENING FOR COLORECTAL CANCER: ICD-10-CM

## 2023-05-02 DIAGNOSIS — F33.9 RECURRENT MAJOR DEPRESSIVE DISORDER, REMISSION STATUS UNSPECIFIED (HCC): ICD-10-CM

## 2023-05-02 DIAGNOSIS — E78.2 MIXED HYPERLIPIDEMIA: Chronic | ICD-10-CM

## 2023-05-02 RX ORDER — NITROGLYCERIN 0.4 MG/1
0.4 TABLET SUBLINGUAL
Qty: 25 TABLET | Refills: 1 | Status: SHIPPED | OUTPATIENT
Start: 2023-05-02 | End: 2023-05-02 | Stop reason: SDUPTHER

## 2023-05-02 RX ORDER — GRANULES FOR ORAL 3 G/1
POWDER ORAL
COMMUNITY
Start: 2023-04-14

## 2023-05-02 RX ORDER — FLUTICASONE PROPIONATE AND SALMETEROL 113; 14 UG/1; UG/1
1 POWDER, METERED RESPIRATORY (INHALATION) 2 TIMES DAILY
Qty: 1 EACH | Refills: 5 | Status: SHIPPED | OUTPATIENT
Start: 2023-05-02

## 2023-05-02 RX ORDER — NITROGLYCERIN 0.4 MG/1
0.4 TABLET SUBLINGUAL
Qty: 25 TABLET | Refills: 1 | Status: SHIPPED | OUTPATIENT
Start: 2023-05-02

## 2023-05-02 NOTE — PATIENT INSTRUCTIONS
Please hold rosuvastatin for 2 weeks and observe if muscle cramps are improving    Please message me or call me in 2 weeks  Please try to increase dietary calcium (milk, yogurt, cottage cheese, cheese etc )  Please start magnesium oxide over-the-counter, they sell them in 250 mg tablets, please take 2 tabs at bedtime  Tonic water at dinnertime

## 2023-05-02 NOTE — PROGRESS NOTES
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  ([facility] [address] )  Assessment & Plan:   pulmonology follows  Spiriva worked better but cost was too high  Currently on Advair discus which is almost $100 a month  Switch Rx to AirDuo respiclick     Orders:  -     fluticasone-salmeterol (AirDuo RespiClick 849/56) 948-11 mcg/act dry powder inhaler; Inhale 1 puff 2 (two) times a day Rinse mouth after use  2  Screening for colorectal cancer  -     Ambulatory referral for colonoscopy; Future    3  Screening mammogram for breast cancer  -     Mammo screening bilateral w 3d & cad; Future    4  Atherosclerosis of coronary artery of native heart without angina pectoris, unspecified vessel or lesion type  Assessment & Plan: On aspirin and statin  No symptoms of chest pain  Patient is requesting prescription for nitroglycerin that she has not used for a while  Just keeps on hand PRN    Orders:  -     nitroglycerin (NITROSTAT) 0 4 mg SL tablet; Place 1 tablet (0 4 mg total) under the tongue every 5 (five) minutes as needed for chest pain    5  UTI symptoms  Assessment & Plan:  Patient was recently treated for UTI  ESBL E  coli  Currently asymptomatic, proceed with repeat UA C&S     Orders:  -     Urine culture  -     Urinalysis with microscopic    6  Solitary pulmonary nodule  Assessment & Plan:  CT chest  2022  9 x 9 mm left upper lobe nodule is stable since 2021, but has demonstrated slow growth since 2017  There is also a 6 mm groundglass nodular opacity in the posterior left upper lobe which is stable since the  exams, but does show slight growth since 2019  Suggested 12 month follow-up to ensure longer-term stability      Patient is still smoking  She follows with Dr Rousseau  Annual CT was recommended      7  Mixed hyperlipidemia  Assessment & Plan:  Good control on  Crestor,LDL is 58  Patient is complaining of generalized muscle aches  Start magnesium oxide  Patient will hold statin for 2 weeks, observe her symptoms and will contact me with an update      8  Recurrent major depressive disorder, remission status unspecified ([facility] [address] )  Assessment & Plan:  Doing well on Wellbutrin XL         Patient Instructions   Please hold rosuvastatin for 2 weeks and observe if muscle cramps are improving  Please message me or call me in 2 weeks  Please try to increase dietary calcium (milk, yogurt, cottage cheese, cheese etc )  Please start magnesium oxide over-the-counter, they sell them in 250 mg tablets, please take 2 tabs at bedtime  Tonic water at dinnertime    Return in 6 months (on 11/2/2023) for Schedule Physical at [facility]  Subjective     Follow-up chronic medical conditions  Results of recent blood work reviewed  Hemoglobin A1c is 5 9%, LDL is 62  Patient is on Crestor 20 mg daily  No complaints of chest pain, palpitations, or dizziness  Patient is due for mammogram and colonoscopy  Patient was recently treated for UTI with 1 dose of antibiotic, ESBL E  coli  She is currently asymptomatic  Patient is complaining of chronic muscle spasms, myalgias  She remains on Flexeril for treatment of chronic low back pain and spasm  Patient is still unfortunately smoking  She remains under care of [facility] pulmonology for surveillance of pulmonary nodule, CT chest is due in July  Depression/anxiety symptoms have been well controlled on Wellbutrin  Patient uses Advair Diskus for COPD, Spiriva worked better but was quite expensive  Review of Systems   Constitutional: Negative  HENT: Negative  Eyes: Negative  Respiratory: Positive for shortness of breath (chronic)  Cardiovascular: Negative  Gastrointestinal: Negative  Endocrine: Negative  Genitourinary: Negative  Musculoskeletal: Positive for arthralgias and myalgias  Allergic/Immunologic: Negative  Neurological: Negative  Hematological: Negative  Past Medical History:   Diagnosis Date    COPD (chronic obstructive pulmonary disease) ([facility] )     GERD (gastroesophageal reflux disease)     Hypercholesterolemia     Iliotibial band tendonitis     Last Assessed: 2015    Migraine     Postprocedural pneumothorax 2021    Prinzmetal angina ([facility] )     Last Assessed: 2017    Sciatica     Last Assessed: 11/10/2014    Thrush of mouth and esophagus ([facility] ) 2021    Tobacco abuse 2018    Vertigo     Last Assessed: 2017     Past Surgical History:   Procedure Laterality Date    COLONOSCOPY  2011    Complete: Dr Bety Reyna - due in 5 years, Colonoscopy 2017, Diverticulosis, 5 mm polyp, Due in 5 years    ESOPHAGOGASTRODUODENOSCOPY      Diagnostic; Last Assessed: 2014    FLEXOR TENDON REPAIR      left finger    HYSTERECTOMY      @ agr 28    IR CHEST TUBE PLACEMENT  2021    NE LARYNGOSCOPY W/WO TRACHEOSCOPY DX EXCEPT  N/A 2019    Procedure: LARYNGOSCOPY DIRECT, FLEXIBLE BRONCHOSCOPY, FLEXIBLE ESOPHAGOSCOPY;  Surgeon: Emili Truong MD;  [facility];  Service: ENT    SHOULDER ARTHROSCOPY Right     SHOULDER SURGERY      TOTAL ABDOMINAL HYSTERECTOMY W/ BILATERAL SALPINGOOPHORECTOMY      endometriosis;  Last Assessed: 2015     Family History   Problem Relation Age of Onset    Lung cancer Mother 47    Other Father         Dyslipidemia    Diabetes Sister     Hypertension Family     Ovarian cancer Maternal Grandmother 48    No Known Problems Daughter     No Known Problems Maternal Grandfather     Lung cancer Paternal Grandmother     No Known Problems Paternal Grandfather     No Known Problems Sister     No Known Problems Sister     No Known Problems Daughter     No Known Problems Maternal Aunt     No Known Problems Paternal Aunt     No Known Problems Paternal Aunt     No Known Problems Paternal Aunt      Social History     Socioeconomic History    Marital status: /Civil Union     Spouse name: None    Number of children: None    Years of education: None    Highest education level: None   Occupational History    None   Tobacco Use    Smoking status: Every Day     Packs/day: 0 50     Years: 48 00     Pack years: 24 00     Types: Cigarettes     Start date: 5     Last attempt to quit: 2021     Years since quittin 8    Smokeless tobacco: Never   Vaping Use    Vaping Use: Former    Quit date: 2021   Substance and Sexual Activity    Alcohol use: No    Drug use: No    Sexual activity: None   Other Topics Concern    None   Social History Narrative    Working full time     Social Determinants of Health     Financial Resource Strain: Not on file   Food Insecurity: Not on file   Transportation Needs: Not on file   Physical Activity: Not on file   Stress: Not on file   Social Connections: Not on file   Intimate Partner Violence: Not on file   Housing Stability: Not on file     Current Outpatient Medications on File Prior to Visit   Medication Sig    albuterol (2 5 mg/3 mL) 0 083 % nebulizer solution Take 3 mL (2 5 mg total) by nebulization every 4 (four) hours as needed for wheezing or shortness of breath    Albuterol Sulfate (ProAir RespiClick) 972 (90 Base) MCG/ACT AEPB INHALE 2 PUFFS BY MOUTH EVERY 4 TO 6 HOURS AS NEEDED FOR COUGH/WHEEZE/SHORTNESS OF BREATH    aspirin (ECOTRIN LOW STRENGTH) 81 mg EC tablet Take 1 tablet by mouth daily    buPROPion (WELLBUTRIN XL) 150 mg 24 hr tablet Take 1 tablet (150 mg total) by mouth every morning    cyclobenzaprine (FLEXERIL) 5 mg tablet TAKE ONE TABLET BY MOUTH EVERY DAY AS NEEDED FOR MUSCLE SPASMS (BACK PAIN)    fosfomycin (MONUROL) 3 g TAKE 3 GRAMS BY MOUTH ONCE FOR 1 DOSE    linaCLOtide (Linzess) 72 MCG CAPS Take 72 mcg by mouth daily as needed "(Constipation)    Multiple Vitamins-Minerals (ZINC PO) Take 1 tablet by mouth daily Zinc w/ vitamin C    pantoprazole (PROTONIX) 40 mg tablet TAKE ONE TABLET BY MOUTH EVERY DAY    phenazopyridine (PYRIDIUM) 200 mg tablet Take 1 tablet (200 mg total) by mouth 3 (three) times a day with meals    rosuvastatin (CRESTOR) 20 MG tablet TAKE ONE TABLET BY MOUTH EVERY DAY    traZODone (DESYREL) 50 mg tablet Take 1 or 2 tablets at bedtime as needed for insomnia    verapamil (CALAN-SR) 240 mg CR tablet Take 1 tablet (240 mg total) by mouth daily     Allergies   Allergen Reactions    Darvon [Propoxyphene] Itching     Immunization History   Administered Date(s) Administered    INFLUENZA 08/20/2015, 11/02/2017    Influenza Quadrivalent Preservative Free 3 years and older IM 08/17/2016, 11/02/2017    Influenza, recombinant, quadrivalent,injectable, preservative free 10/30/2018, 10/02/2019, 10/26/2020    Influenza, seasonal, injectable 1960, 09/01/2011, 01/24/2013    Pneumococcal Polysaccharide PPV23 11/15/2006, 11/02/2017       Objective     /67 (BP Location: Left arm, Patient Position: Sitting, Cuff Size: Standard)   Pulse 76   Temp 98 °F (36 7 °C)   Resp 16   Ht 5' 2\" (1 575 m)   Wt 72 kg (158 lb 12 8 oz)   SpO2 95%   BMI 29 04 kg/m²     Physical Exam  Vitals and nursing note reviewed  Constitutional:       General: She is not in acute distress  Appearance: Normal appearance  She is well-developed  She is not ill-appearing  HENT:      Head: Normocephalic and atraumatic  Eyes:      Conjunctiva/sclera: Conjunctivae normal    Neck:      Thyroid: No thyromegaly  Vascular: No carotid bruit  Cardiovascular:      Rate and Rhythm: Normal rate and regular rhythm  Heart sounds: Normal heart sounds  No murmur heard  Pulmonary:      Effort: Pulmonary effort is normal  No respiratory distress  Breath sounds: Normal breath sounds  No wheezing     Abdominal:      General: There is no " abdominal bruit  Musculoskeletal:         General: Normal range of motion  Cervical back: Neck supple  No rigidity  Neurological:      General: No focal deficit present  Mental Status: She is alert and oriented to person, place, and time  Cranial Nerves: No cranial nerve deficit  Coordination: Coordination normal    Psychiatric:         Mood and Affect: Mood normal          Behavior: Behavior normal          Thought Content:  Thought content normal        Evgeny Hodge MD

## 2023-05-03 ENCOUNTER — APPOINTMENT (OUTPATIENT)
Dept: LAB | Facility: CLINIC | Age: 63
End: 2023-05-03

## 2023-05-03 LAB
BACTERIA UR QL AUTO: NORMAL /HPF
BILIRUB UR QL STRIP: NEGATIVE
CLARITY UR: CLEAR
COLOR UR: NORMAL
GLUCOSE UR STRIP-MCNC: NEGATIVE MG/DL
HGB UR QL STRIP.AUTO: NEGATIVE
KETONES UR STRIP-MCNC: NEGATIVE MG/DL
LEUKOCYTE ESTERASE UR QL STRIP: NEGATIVE
NITRITE UR QL STRIP: NEGATIVE
NON-SQ EPI CELLS URNS QL MICRO: NORMAL /HPF
PH UR STRIP.AUTO: 6.5 [PH]
PROT UR STRIP-MCNC: NEGATIVE MG/DL
RBC #/AREA URNS AUTO: NORMAL /HPF
SP GR UR STRIP.AUTO: 1.01 (ref 1–1.03)
UROBILINOGEN UR STRIP-ACNC: <2 MG/DL
WBC #/AREA URNS AUTO: NORMAL /HPF

## 2023-05-04 LAB — BACTERIA UR CULT: NORMAL

## 2023-05-06 PROBLEM — Z00.00 ENCOUNTER FOR WELLNESS EXAMINATION IN ADULT: Status: RESOLVED | Noted: 2022-05-08 | Resolved: 2023-05-06

## 2023-05-06 PROBLEM — R39.9 UTI SYMPTOMS: Chronic | Status: ACTIVE | Noted: 2023-04-10

## 2023-05-06 PROBLEM — B37.0 THRUSH OF MOUTH AND ESOPHAGUS (HCC): Status: RESOLVED | Noted: 2021-06-30 | Resolved: 2023-05-06

## 2023-05-06 PROBLEM — B37.81 THRUSH OF MOUTH AND ESOPHAGUS (HCC): Status: RESOLVED | Noted: 2021-06-30 | Resolved: 2023-05-06

## 2023-05-06 PROBLEM — F17.211 CIGARETTE NICOTINE DEPENDENCE IN REMISSION: Status: RESOLVED | Noted: 2021-07-06 | Resolved: 2023-05-06

## 2023-05-06 PROBLEM — J95.811 POSTPROCEDURAL PNEUMOTHORAX: Status: RESOLVED | Noted: 2021-07-09 | Resolved: 2023-05-06

## 2023-05-06 NOTE — ASSESSMENT & PLAN NOTE
On aspirin and statin  No symptoms of chest pain  Patient is requesting prescription for nitroglycerin that she has not used for a while    Just keeps on hand PRN

## 2023-05-06 NOTE — ASSESSMENT & PLAN NOTE
CT chest  7/2022  9 x 9 mm left upper lobe nodule is stable since April 2021, but has demonstrated slow growth since October 2017  There is also a 6 mm groundglass nodular opacity in the posterior left upper lobe which is stable since the 2021 exams, but does show slight growth since April 2019  Suggested 12 month follow-up to ensure longer-term stability      Patient is still smoking  She follows with Dr Rousseau  Annual CT was recommended

## 2023-05-06 NOTE — ASSESSMENT & PLAN NOTE
Good control on  Crestor,LDL is 62  Patient is complaining of generalized muscle aches  Start magnesium oxide    Patient will hold statin for 2 weeks, observe her symptoms and will contact me with an update

## 2023-05-06 NOTE — ASSESSMENT & PLAN NOTE
[facility] pulmonology follows  Spiriva worked better but cost was too high  Currently on Advair discus which is almost $100 a month    Switch Rx to Sergio Summers

## 2023-05-06 NOTE — ASSESSMENT & PLAN NOTE
Patient was recently treated for UTI  ESBL E  coli  Currently asymptomatic, proceed with repeat UA C&S

## 2023-05-22 DIAGNOSIS — F41.8 DEPRESSION WITH ANXIETY: ICD-10-CM

## 2023-05-22 RX ORDER — BUPROPION HYDROCHLORIDE 150 MG/1
TABLET ORAL
Qty: 90 TABLET | Refills: 3 | Status: SHIPPED | OUTPATIENT
Start: 2023-05-22

## 2023-07-05 ENCOUNTER — HOSPITAL ENCOUNTER (OUTPATIENT)
Dept: RADIOLOGY | Age: 63
Discharge: HOME | End: 2023-07-05
Payer: COMMERCIAL

## 2023-07-05 VITALS — WEIGHT: 157 LBS | HEIGHT: 62 IN | BODY MASS INDEX: 28.89 KG/M2

## 2023-07-05 DIAGNOSIS — Z12.31 SCREENING MAMMOGRAM FOR BREAST CANCER: ICD-10-CM

## 2023-07-05 PROCEDURE — 77067 SCR MAMMO BI INCL CAD: CPT

## 2023-07-05 PROCEDURE — 77063 BREAST TOMOSYNTHESIS BI: CPT

## 2023-07-31 ENCOUNTER — HOSPITAL ENCOUNTER (OUTPATIENT)
Dept: CT IMAGING | Facility: HOSPITAL | Age: 63
Discharge: HOME | End: 2023-07-31
Payer: COMMERCIAL

## 2023-07-31 DIAGNOSIS — R91.1 SOLITARY PULMONARY NODULE: ICD-10-CM

## 2023-07-31 PROCEDURE — G1004 CDSM NDSC: HCPCS

## 2023-07-31 PROCEDURE — 71250 CT THORAX DX C-: CPT

## 2023-08-07 ENCOUNTER — TELEPHONE (OUTPATIENT)
Dept: FAMILY MEDICINE CLINIC | Facility: CLINIC | Age: 63
End: 2023-08-07

## 2023-08-08 ENCOUNTER — TELEPHONE (OUTPATIENT)
Dept: FAMILY MEDICINE CLINIC | Facility: CLINIC | Age: 63
End: 2023-08-08

## 2023-08-09 NOTE — TELEPHONE ENCOUNTER
Please contact patient. I sent her [employer] message regarding results of recent CT chest.  Please ask her to kindly review and proceed.   Thank you

## 2023-08-14 ENCOUNTER — TELEPHONE (OUTPATIENT)
Dept: PULMONOLOGY | Facility: CLINIC | Age: 63
End: 2023-08-14

## 2023-08-16 ENCOUNTER — OFFICE VISIT (OUTPATIENT)
Dept: PULMONOLOGY | Facility: CLINIC | Age: 63
End: 2023-08-16
Payer: COMMERCIAL

## 2023-08-16 VITALS
HEART RATE: 77 BPM | WEIGHT: 156 LBS | DIASTOLIC BLOOD PRESSURE: 78 MMHG | BODY MASS INDEX: 28.71 KG/M2 | TEMPERATURE: 97.2 F | SYSTOLIC BLOOD PRESSURE: 130 MMHG | HEIGHT: 62 IN | OXYGEN SATURATION: 95 %

## 2023-08-16 DIAGNOSIS — F17.200 TOBACCO USE DISORDER: ICD-10-CM

## 2023-08-16 DIAGNOSIS — J44.9 MODERATE COPD (CHRONIC OBSTRUCTIVE PULMONARY DISEASE) (HCC): Primary | ICD-10-CM

## 2023-08-16 DIAGNOSIS — R91.1 SOLITARY PULMONARY NODULE: ICD-10-CM

## 2023-08-16 PROCEDURE — 99214 OFFICE O/P EST MOD 30 MIN: CPT | Performed by: INTERNAL MEDICINE

## 2023-08-16 NOTE — PROGRESS NOTES
Progress Note - Pulmonary   Jer Ramírez 58 y.o. female MRN: [id number]   Encounter: [id number]      Assessment/Plan:  Patient is a 58-year-old female with COPD, nicotine dependence, and multiple pulmonary nodules who presents for a routine follow up visit. She reports uncontrolled COPD symptoms while on AirDuo. She is looking to quit smoking again, and wants to trial the patches again as they have worked for her in the past. She had repeat CT chest which showing fat-containing 9 mm left upper lobe nodule and a part solid posterior left upper lobe nodule with 5 mm solid component, increased in size and density since 2017. Moderate COPD  -  Continue spiriva/albuterol  -  Gave samples and coupons for both Stiolto and Spiriva. Patient to trial and let us know which inhaler works best for her as well as the affordability      Lung nodule  -  S/p stef bronch with no evidence of cancer  -  Repeat CT chest w/o co in 1 year    Nicotine dependence  -  Encourage smoke cessation   -  Continue nicotine patches      1. 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 Solitary pulmonary nodule  -     CT chest without contrast; Future; Expected date: 07/31/2024    3. Tobacco use disorder      Patient may follow up in 3 months or sooner as necessary. Orders:  Orders Placed This Encounter   Procedures   • CT chest without contrast     Standing Status:   Future     Standing Expiration Date:   8/16/2027     Scheduling Instructions: There is no prep for this study. Please bring your insurance cards, a form of photo ID and a list of your medications with you. Arrive 15 minutes prior to your appointment time to register. On the day of your test, please bring any prior CT or MRI studies of this area with you that were not performed at a [facility]. To schedule this appointment, please contact Central Scheduling at [phone].                  Order Specific Question: What is the patient's sedation requirement? If Medication for Claustrophobia is selected, order medication at this point. Answer:   No Sedation     Order Specific Question:   Release to patient through [employer]     Answer:   Immediate     Order Specific Question:   Reason for Exam (FREE TEXT)     Answer: Follow up MICHAEL pulmonary nodule       Subjective:   Generally follow with Dr. Nadege Marsh. Regarding COPD, she reports good days and bad days. Bad days are approximately 3 times a month reporting increased shortness of breath and mucus production. No recent abx or steroid use. She reports difficulty with hills and carrying groceries at baseline. Current inhalers include ProAir PRN and AirDuo daily. She uses the ProAir approximately once daily. She reports trying Spiriva in the past with good response, however, her inhaler was switched due to the price. Patient is a current smoker. She had quit prior to her navigational bronchoscopy for approximately 3 months. She currently smokes 1ppd. She used nicotine patches in the past to help her quit. She trailed chantix in the past per her , but does not remember this. Follow up CT chest showing fat-containing 9 mm left upper lobe nodule and a part solid posterior left upper lobe nodule with 5 mm solid component, increased in size and density since 2017. Inhaler Regimen:  AirDuo daily  ProAir PRN    Remainder of review of systems negative except as described in HPI. The following portions of the patient's history were reviewed and updated as appropriate: allergies, current medications, past family history, past medical history, past social history, past surgical history and problem list.     Objective:   Vitals: Body mass index is 28.53 kg/m².     Physical Exam  Gen: Pleasant, awake, alert, oriented x 3, no acute distress  HEENT: Mucous membranes moist, no oral lesions, no thrush  NECK: No accessory muscle use, JVP not elevated  Cardiac: RRR, single S1, single S2, no murmurs, no rubs, no gallops  Lungs: Clear to auscultation bilaterally, no wheezes, rales, rhonchi   Abdomen: normoactive bowel sounds, soft nontender, nondistended, no rebound or rigidity, no guarding  Extremities: no cyanosis, no clubbing, - edema  MSK:  Strength equal in all extremities  Derm:  No rashes/lesions noted  Neuro:  Appropriate mood/affect    Labs: I have personally reviewed pertinent lab results. Lab Results   Component Value Date    WBC 7.29 04/27/2023    WBC 7.3 10/31/2017    HGB 15.6 (H) 04/27/2023    HGB 15.9 (H) 10/31/2017     04/27/2023     10/31/2017     Lab Results   Component Value Date    CREATININE 0.78 04/27/2023    CREATININE 0.81 01/18/2018        Imaging and other studies: I have personally reviewed pertinent reports. and I have personally reviewed pertinent films in PACS  CT Chest 7/31/2023  My interpretation:  Emphysema, fat containing nodule, part solid nodule    Radiology findings:  LUNGS: 9 mm smoothly marginated anterior left upper lobe nodule with density in some areas measuring -100 Hounsfield units compatible with fat. 9 mm part solid nodule in the posterior left upper lobe (3/63) with a 5 mm solid component (601/94), slowly increasing in size and density since 2017. Severe upper lobe predominant panlobular emphysema. Mild benign bilateral scar. Benign thin-walled cyst in the right lower lobe. Benign intrapulmonary lymph node abutting the minor fissure. Stable benign 3 mm anterior left upper lobe nodule (3/90). AIRWAYS: No significant filling defects. Mild diffuse bronchial wall thickening. PLEURA:  Unremarkable. HEART/GREAT VESSELS:  Normal for age. MEDIASTINUM AND KIANNA:  Unremarkable. CHEST WALL AND LOWER NECK: Unremarkable. Pulmonary Function Testing: I have personally reviewed pertinent reports. and I have personally reviewed pertinent films in PACS  5/27/2021:   Moderate obstruction with moderate diffusion defect  FEV1/FVC Ratio: 59 %  Forced Vital Capacity: 2/63 L    92 % predicted  FEV1: 1.54 L     65 % predicted  Lung volumes by body plethysmography:   Total Lung Capacity 91 % predicted   Residual volume 80 % predicted  DLCO corrected for patients hemoglobin level: 58 %    Gomez Cruzmann, [address] [place] [address]

## 2023-09-08 ENCOUNTER — TELEPHONE (OUTPATIENT)
Dept: PULMONOLOGY | Facility: CLINIC | Age: 63
End: 2023-09-08

## 2023-09-08 DIAGNOSIS — J44.9 MODERATE COPD (CHRONIC OBSTRUCTIVE PULMONARY DISEASE) (HCC): Primary | ICD-10-CM

## 2023-09-08 RX ORDER — TIOTROPIUM BROMIDE AND OLODATEROL 3.124; 2.736 UG/1; UG/1
2 SPRAY, METERED RESPIRATORY (INHALATION) DAILY
Qty: 4 G | Refills: 5 | Status: SHIPPED | OUTPATIENT
Start: 2023-09-08

## 2023-09-08 NOTE — TELEPHONE ENCOUNTER
Patient called, she is asking if Cedric Corrales can send in a script for the Stiolto inhaler to her [facility]. She had more relief and feels better using the Stiolto. She is not sure of the pricing yet, she didn't know if you could send in scripts for both inhalers and she can see what the price would be at the pharmacy and can decide.  Please advise

## 2023-11-08 ENCOUNTER — OFFICE VISIT (OUTPATIENT)
Dept: FAMILY MEDICINE CLINIC | Facility: CLINIC | Age: 63
End: 2023-11-08
Payer: COMMERCIAL

## 2023-11-08 VITALS
OXYGEN SATURATION: 94 % | TEMPERATURE: 98 F | DIASTOLIC BLOOD PRESSURE: 68 MMHG | SYSTOLIC BLOOD PRESSURE: 128 MMHG | HEART RATE: 73 BPM | HEIGHT: 62 IN | RESPIRATION RATE: 16 BRPM | BODY MASS INDEX: 28.67 KG/M2 | WEIGHT: 155.8 LBS

## 2023-11-08 DIAGNOSIS — I25.10 ATHEROSCLEROSIS OF CORONARY ARTERY OF NATIVE HEART WITHOUT ANGINA PECTORIS, UNSPECIFIED VESSEL OR LESION TYPE: Chronic | ICD-10-CM

## 2023-11-08 DIAGNOSIS — F33.40 RECURRENT MAJOR DEPRESSIVE DISORDER, IN REMISSION (HCC): ICD-10-CM

## 2023-11-08 DIAGNOSIS — Z12.11 COLON CANCER SCREENING: ICD-10-CM

## 2023-11-08 DIAGNOSIS — E78.2 MIXED HYPERLIPIDEMIA: Chronic | ICD-10-CM

## 2023-11-08 DIAGNOSIS — J01.01 ACUTE RECURRENT MAXILLARY SINUSITIS: ICD-10-CM

## 2023-11-08 DIAGNOSIS — J44.9 MODERATE COPD (CHRONIC OBSTRUCTIVE PULMONARY DISEASE) (HCC): ICD-10-CM

## 2023-11-08 DIAGNOSIS — Z00.00 ENCOUNTER FOR WELLNESS EXAMINATION IN ADULT: Primary | ICD-10-CM

## 2023-11-08 PROCEDURE — 99396 PREV VISIT EST AGE 40-64: CPT | Performed by: FAMILY MEDICINE

## 2023-11-08 PROCEDURE — 99213 OFFICE O/P EST LOW 20 MIN: CPT | Performed by: FAMILY MEDICINE

## 2023-11-08 RX ORDER — ALBUTEROL SULFATE 90 UG/1
2 AEROSOL, METERED RESPIRATORY (INHALATION) EVERY 4 HOURS PRN
Qty: 18 G | Refills: 3 | Status: SHIPPED | OUTPATIENT
Start: 2023-11-08

## 2023-11-08 RX ORDER — FLUTICASONE PROPIONATE 50 MCG
2 SPRAY, SUSPENSION (ML) NASAL DAILY
Qty: 16 G | Refills: 5 | Status: SHIPPED | OUTPATIENT
Start: 2023-11-08

## 2023-11-08 RX ORDER — AMOXICILLIN 875 MG/1
875 TABLET, COATED ORAL 2 TIMES DAILY
Qty: 20 TABLET | Refills: 0 | Status: SHIPPED | OUTPATIENT
Start: 2023-11-08 | End: 2023-11-18

## 2023-11-08 NOTE — PROGRESS NOTES
Name: Luban White      : 1960      MRN: [id number]  Encounter Provider: Beryl Kaur MD  Encounter Date: 2023   Encounter department: [address] [place] [address]     1. Encounter for wellness examination in adult  Comments:  Patient declines vaccination. Continue annual mammogram screening. Referral for colonoscopy    2. Mixed hyperlipidemia  Assessment & Plan:  Patient with significant improvement of chronic back spasms and myalgias after discontinuation of Crestor. History of nonobstructive CAD. Proceed with lipid panel for reevaluation    Orders:  -     Lipid Panel with Direct LDL reflex; Future    3. Colon cancer screening  -     Ambulatory referral to Gastroenterology; Future    4. 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 Most recent CT 2023: Annual follow-up recommended. Continue Stiolto, PRN albuterol. We again discussed importance of tobacco cessation    Orders:  -     albuterol (PROVENTIL HFA,VENTOLIN HFA) 90 mcg/act inhaler; Inhale 2 puffs every 4 (four) hours as needed for wheezing or shortness of breath (cough)    5. Acute recurrent maxillary sinusitis  -     amoxicillin (AMOXIL) 875 mg tablet; Take 1 tablet (875 mg total) by mouth 2 (two) times a day for 10 days  -     fluticasone (FLONASE) 50 mcg/act nasal spray; 2 sprays into each nostril daily    6. Recurrent major depressive disorder, in remission [facility])  Assessment & Plan:  Patient has discontinued Wellbutrin. Denies symptoms of depression at present time      7.  Atherosclerosis of coronary artery of native heart without angina pectoris, unspecified vessel or lesion type  Assessment & Plan:  Patient discontinued verapamil, BP has been well controlled  No symptoms of angina or unusual dyspnea  I strongly advised her to restart aspirin 81 mg daily  repeat lipid panel      Return in about 29 weeks (around 5/29/2024) for Medicare wellness. Subjective     Annual well exam.  Follow-up chronic medical conditions. Has been experiencing nasal congestion, postnasal drip, sinusitis symptoms/sinus pressure within past few days. She denies symptoms of chest tightness or unusual shortness of breath. Recent follow-up with [facility] pulmonology, patient uses Stiolto. She has discontinued Wellbutrin and PPI. She reports significant improvement of chronic back spasms after discontinuation of Crestor, no current statin therapy. Patient with history of nonobstructive CAD. No symptoms of angina. She has not been following up with cardiology for quite a while. Review of Systems   Constitutional: Negative. HENT:  Positive for congestion, postnasal drip and sinus pressure. Eyes: Negative. Respiratory: Negative. Cardiovascular: Negative. Gastrointestinal: Negative. Endocrine: Negative. Genitourinary: Negative. Musculoskeletal: Negative. Skin: Negative. Allergic/Immunologic: Negative. Neurological: Negative. Hematological: Negative. Psychiatric/Behavioral: Negative. Past Medical History:   Diagnosis Date   • COPD (chronic obstructive pulmonary disease) ([address])    • GERD (gastroesophageal reflux disease)    • Hypercholesterolemia    • Iliotibial band tendonitis     Last Assessed: 6/5/2015   • Migraine    • Postprocedural pneumothorax 7/9/2021   • Prinzmetal angina ([address])     Last Assessed: 5/23/2017   • Sciatica     Last Assessed: 11/10/2014   • Thrush of mouth and esophagus  6/30/2021   • Tobacco abuse 4/19/2018   • Vertigo     Last Assessed: 4/12/2017     Past Surgical History:   Procedure Laterality Date   • COLONOSCOPY  06/2011    Complete: Dr. James Toribio - due in 5 years, Colonoscopy Nov 2017, Diverticulosis, 5 mm polyp, Due in 5 years   • ESOPHAGOGASTRODUODENOSCOPY      Diagnostic;  Last Assessed: 7/11/2014   • FLEXOR TENDON REPAIR      left finger   • HYSTERECTOMY      @ agr 28   • IR CHEST TUBE PLACEMENT  2021   • NC LARYNGOSCOPY W/WO TRACHEOSCOPY DX EXCEPT  N/A 2019    Procedure: LARYNGOSCOPY DIRECT, FLEXIBLE BRONCHOSCOPY, FLEXIBLE ESOPHAGOSCOPY;  Surgeon: Mariah Combs MD;  Location: [facility];  Service: ENT   • SHOULDER ARTHROSCOPY Right    • SHOULDER SURGERY     • TOTAL ABDOMINAL HYSTERECTOMY W/ BILATERAL SALPINGOOPHORECTOMY      endometriosis;  Last Assessed: 2015     Family History   Problem Relation Age of Onset   • Lung cancer Mother 47   • Other Father         Dyslipidemia   • Diabetes Sister    • No Known Problems Sister    • No Known Problems Sister    • No Known Problems Daughter    • No Known Problems Daughter    • Ovarian cancer Maternal Grandmother 48   • No Known Problems Maternal Grandfather    • Lung cancer Paternal Grandmother    • No Known Problems Paternal Grandfather    • No Known Problems Maternal Aunt    • No Known Problems Paternal Aunt    • No Known Problems Paternal Aunt    • No Known Problems Paternal Aunt    • Hypertension Family    • Breast cancer Other         doesn't know age     Social History     Socioeconomic History   • Marital status: /Civil Union     Spouse name: None   • Number of children: None   • Years of education: None   • Highest education level: None   Occupational History   • None   Tobacco Use   • Smoking status: Every Day     Packs/day: 0.50     Years: 48.00     Total pack years: 24.00     Types: Cigarettes     Start date: 5     Last attempt to quit: 2021     Years since quittin.3   • Smokeless tobacco: Never   Vaping Use   • Vaping Use: Former   • Quit date: 2021   Substance and Sexual Activity   • Alcohol use: No   • Drug use: No   • Sexual activity: None   Other Topics Concern   • None   Social History Narrative    Working full time     Social Determinants of Health     Financial Resource Strain: Not on file   Food Insecurity: Not on file   Transportation Needs: Not on file   Physical Activity: Not on file   Stress: Not on file   Social Connections: Not on file   Intimate Partner Violence: Not on file   Housing Stability: Not on file     Current Outpatient Medications on File Prior to Visit   Medication Sig   • albuterol (2.5 mg/3 mL) 0.083 % nebulizer solution Take 3 mL (2.5 mg total) by nebulization every 4 (four) hours as needed for wheezing or shortness of breath   • tiotropium-olodaterol (Stiolto Respimat) 2.5-2.5 MCG/ACT inhaler Inhale 2 puffs daily   • aspirin (ECOTRIN LOW STRENGTH) 81 mg EC tablet Take 1 tablet by mouth daily (Patient not taking: Reported on 11/8/2023)   • cyclobenzaprine (FLEXERIL) 5 mg tablet TAKE ONE TABLET BY MOUTH EVERY DAY AS NEEDED FOR MUSCLE SPASMS (BACK PAIN) (Patient not taking: Reported on 11/8/2023)   • fosfomycin (MONUROL) 3 g  (Patient not taking: Reported on 11/8/2023)   • nitroglycerin (NITROSTAT) 0.4 mg SL tablet Place 1 tablet (0.4 mg total) under the tongue every 5 (five) minutes as needed for chest pain (Patient not taking: Reported on 8/16/2023)     Allergies   Allergen Reactions   • Darvon [Propoxyphene] Itching   • Crestor [Rosuvastatin] Myalgia     Immunization History   Administered Date(s) Administered   • INFLUENZA 08/20/2015, 11/02/2017   • Influenza Quadrivalent Preservative Free 3 years and older IM 08/17/2016, 11/02/2017   • Influenza, recombinant, quadrivalent,injectable, preservative free 10/30/2018, 10/02/2019, 10/26/2020   • Influenza, seasonal, injectable 1960, 09/01/2011, 01/24/2013   • Pneumococcal Polysaccharide PPV23 11/15/2006, 11/02/2017       Objective     /68 (BP Location: Left arm, Patient Position: Sitting, Cuff Size: Standard)   Pulse 73   Temp 98 °F (36.7 °C) (Temporal)   Resp 16   Ht 5' 2" (1.575 m)   Wt 70.7 kg (155 lb 12.8 oz)   SpO2 94%   BMI 28.50 kg/m²     Physical Exam  Vitals and nursing note reviewed. Constitutional:       General: She is not in acute distress.      Appearance: Normal appearance. She is well-developed. She is not ill-appearing. HENT:      Head: Normocephalic and atraumatic. Right Ear: Tympanic membrane normal.      Left Ear: Tympanic membrane normal.      Nose: Congestion present. Mouth/Throat:      Pharynx: Posterior oropharyngeal erythema present. Comments: Postnasal drip  Eyes:      Conjunctiva/sclera: Conjunctivae normal.   Neck:      Thyroid: No thyromegaly. Vascular: No carotid bruit. Cardiovascular:      Rate and Rhythm: Normal rate and regular rhythm. Heart sounds: Normal heart sounds. No murmur heard. Pulmonary:      Effort: Pulmonary effort is normal. No respiratory distress. Breath sounds: Normal breath sounds. No wheezing. Abdominal:      General: There is no abdominal bruit. Musculoskeletal:         General: Normal range of motion. Cervical back: Neck supple. Neurological:      General: No focal deficit present. Mental Status: She is alert and oriented to person, place, and time. Cranial Nerves: No cranial nerve deficit.       Coordination: Coordination normal.   Psychiatric:         Mood and Affect: Mood normal.         Behavior: Behavior normal.       Sarah Allen MD

## 2023-11-10 PROBLEM — F41.8 DEPRESSION WITH ANXIETY: Status: RESOLVED | Noted: 2022-05-08 | Resolved: 2023-11-10

## 2023-11-10 PROBLEM — R49.0 HOARSENESS: Status: RESOLVED | Noted: 2019-04-23 | Resolved: 2023-11-10

## 2023-11-10 PROBLEM — R39.9 UTI SYMPTOMS: Chronic | Status: RESOLVED | Noted: 2023-04-10 | Resolved: 2023-11-10

## 2023-11-11 NOTE — ASSESSMENT & PLAN NOTE
Patient with significant improvement of chronic back spasms and myalgias after discontinuation of Crestor. History of nonobstructive CAD.   Proceed with lipid panel for reevaluation

## 2023-11-11 NOTE — ASSESSMENT & PLAN NOTE
Patient discontinued verapamil, BP has been well controlled  No symptoms of angina or unusual dyspnea  I strongly advised her to restart aspirin 81 mg daily  repeat lipid panel

## 2023-11-11 NOTE — ASSESSMENT & PLAN NOTE
[facility] pulmonology follows. Most recent CT July 2023: Annual follow-up recommended. Continue Stiolto, PRN albuterol.   We again discussed importance of tobacco cessation

## 2023-11-14 ENCOUNTER — APPOINTMENT (OUTPATIENT)
Dept: LAB | Facility: CLINIC | Age: 63
End: 2023-11-14
Payer: COMMERCIAL

## 2023-11-14 DIAGNOSIS — E78.2 MIXED HYPERLIPIDEMIA: Chronic | ICD-10-CM

## 2023-11-14 LAB
CHOLEST SERPL-MCNC: 249 MG/DL
HDLC SERPL-MCNC: 40 MG/DL
LDLC SERPL CALC-MCNC: 167 MG/DL (ref 0–100)
TRIGL SERPL-MCNC: 212 MG/DL

## 2023-11-14 PROCEDURE — 36415 COLL VENOUS BLD VENIPUNCTURE: CPT

## 2023-11-14 PROCEDURE — 80061 LIPID PANEL: CPT

## 2023-11-15 ENCOUNTER — OFFICE VISIT (OUTPATIENT)
Dept: PULMONOLOGY | Facility: CLINIC | Age: 63
End: 2023-11-15
Payer: COMMERCIAL

## 2023-11-15 VITALS
BODY MASS INDEX: 27.97 KG/M2 | OXYGEN SATURATION: 97 % | HEART RATE: 74 BPM | TEMPERATURE: 98.2 F | WEIGHT: 152 LBS | SYSTOLIC BLOOD PRESSURE: 148 MMHG | HEIGHT: 62 IN | DIASTOLIC BLOOD PRESSURE: 90 MMHG

## 2023-11-15 DIAGNOSIS — F17.200 TOBACCO USE DISORDER: Primary | ICD-10-CM

## 2023-11-15 DIAGNOSIS — I25.10 ATHEROSCLEROSIS OF CORONARY ARTERY OF NATIVE HEART WITHOUT ANGINA PECTORIS, UNSPECIFIED VESSEL OR LESION TYPE: Chronic | ICD-10-CM

## 2023-11-15 DIAGNOSIS — J44.9 MODERATE COPD (CHRONIC OBSTRUCTIVE PULMONARY DISEASE) (HCC): ICD-10-CM

## 2023-11-15 PROCEDURE — 99214 OFFICE O/P EST MOD 30 MIN: CPT | Performed by: INTERNAL MEDICINE

## 2023-11-15 RX ORDER — NICOTINE 21 MG/24HR
1 PATCH, TRANSDERMAL 24 HOURS TRANSDERMAL EVERY 24 HOURS
Qty: 28 PATCH | Refills: 0 | Status: SHIPPED | OUTPATIENT
Start: 2023-11-15

## 2023-11-15 NOTE — PROGRESS NOTES
Progress Note - Pulmonary   Buster Alex 58 y.o. female MRN: [id number]   Encounter: [id number]      Assessment/Plan:  Patient is 58 old female with past medical history significant for moderate COPD who presents for routine follow-up. Overall patient is doing well. She notes that the Stiolto is quite expensive. She was given samples of both Stiolto and Breztri as well as coupons. If she has difficulty with the coupons, may call office for assistance. Moderate COPD  -  Continue stiolto   -  quite expensive   -  given coupon but may check with pharmacy  -  sample of Breztri provided   -  may trial as well when off stiolto   -  coupon provided  -  continue albuterol MDI and nebulizer   -  tubing provided     Lung nodule  -  Repeat CT chest w/o contrast in 1 year  -  due in 7/2024     Nicotine dependence  -  Encourage smoke cessation    -  1/2-3/4 ppd  -  Resume nicotine patches   -  order placed    1. Tobacco use disorder  -     nicotine (NICODERM CQ) 14 mg/24hr TD 24 hr patch; Place 1 patch on the skin over 24 hours every 24 hours    2. Atherosclerosis of coronary artery of native heart without angina pectoris, unspecified vessel or lesion type    3. 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 Orders:  No orders of the defined types were placed in this encounter. Subjective:   She is paying nearly 200 for stiolto. She is down to 1/2-3/4 pack. She was previously more than 1 pack pack. She notes shortness of breath with laying on her left side. This was associated with her sinus infection. There is no pain just difficulty breathing. There is no change in her shortness of breath. She is currently being treated for a sinus infection with amoxicillin. She has not required steroids or hospitalizations.       Inhaler Regimen:  Stiolto  Albuterol 2x/daily  Albuterol 1-2x/week    Remainder of review of systems negative except as described in HPI. The following portions of the patient's history were reviewed and updated as appropriate: allergies, current medications, past family history, past medical history, past social history, past surgical history and problem list.     Objective:   Vitals: Blood pressure 148/90, pulse 74, temperature 98.2 °F (36.8 °C), height 5' 2" (1.575 m), weight 68.9 kg (152 lb), SpO2 97 %, not currently breastfeeding., RA, Body mass index is 27.8 kg/m². Physical Exam  Gen: Pleasant, awake, alert, oriented x 3, no acute distress  HEENT: Mucous membranes moist, no oral lesions, no thrush  NECK: No accessory muscle use, JVP not elevated  Cardiac: RRR, single S1, single S2, no murmurs, no rubs, no gallops  Lungs: CTA b/l  Abdomen: normoactive bowel sounds, soft nontender, nondistended, no rebound or rigidity, no guarding  Extremities: no cyanosis, no clubbing, no LE edema  MSK:  Strength equal in all extremities  Derm:  No rashes/lesions noted  Neuro:  Appropriate mood/affect    Labs: I have personally reviewed pertinent lab results. Lab Results   Component Value Date    WBC 7.29 04/27/2023    WBC 7.3 10/31/2017    HGB 15.6 (H) 04/27/2023    HGB 15.9 (H) 10/31/2017     04/27/2023     10/31/2017     Lab Results   Component Value Date    CREATININE 0.78 04/27/2023    CREATININE 0.81 01/18/2018        Imaging and other studies: I have personally reviewed pertinent reports. CT Chest 7/31/2023  Radiology findings:  LUNGS: 9 mm smoothly marginated anterior left upper lobe nodule with density in some areas measuring -100 Hounsfield units compatible with fat. 9 mm part solid nodule in the posterior left upper lobe (3/63) with a 5 mm solid component (601/94), slowly increasing in size and density since 2017. Severe upper lobe predominant panlobular emphysema. Mild benign bilateral scar. Benign thin-walled cyst in the right lower lobe.  Benign intrapulmonary lymph node abutting the minor fissure. Stable benign 3 mm anterior left upper lobe nodule (3/90). AIRWAYS: No significant filling defects. Mild diffuse bronchial wall thickening. PLEURA:  Unremarkable. HEART/GREAT VESSELS:  Normal for age. MEDIASTINUM AND KIANNA:  Unremarkable. CHEST WALL AND LOWER NECK: Unremarkable. UPPER ABDOMEN: Stable 2.2 cm right adrenal nodule. Pulmonary Function Testing: I have personally reviewed pertinent reports. and I have personally reviewed pertinent films in PACS  5/27/2021:  FEV1/FVC Ratio: 59 %  Forced Vital Capacity: 2/63 L    92 % predicted  FEV1: 1.54 L     65 % predicted     Lung volumes by body plethysmography:   Total Lung Capacity 91 % predicted   Residual volume 80 % predicted     DLCO corrected for patients hemoglobin level: 58 %    Gomez Telles, [address] [place] [address]

## 2023-11-20 ENCOUNTER — TELEPHONE (OUTPATIENT)
Dept: FAMILY MEDICINE CLINIC | Facility: CLINIC | Age: 63
End: 2023-11-20

## 2023-11-20 DIAGNOSIS — E78.2 MIXED HYPERLIPIDEMIA: Primary | Chronic | ICD-10-CM

## 2023-11-20 RX ORDER — EZETIMIBE AND SIMVASTATIN 10; 10 MG/1; MG/1
1 TABLET ORAL
Qty: 90 TABLET | Refills: 1 | Status: SHIPPED | OUTPATIENT
Start: 2023-11-20

## 2023-11-20 NOTE — TELEPHONE ENCOUNTER
Please contact patient. Blood work indicates elevated cholesterol after discontinuation of rosuvastatin. I recommend to try alternative medication, Vytorin. Patient should start taking it as 1 tablet every other day for the first 4 weeks and observe for side effects of muscle aches/muscle spasms. As long as she tolerates Rx well-she should increase dose to 1 tablet daily. Repeat blood work prior to our next office visit.   Orders placed, thank you

## 2023-12-14 DIAGNOSIS — J44.1 CHRONIC OBSTRUCTIVE PULMONARY DISEASE WITH ACUTE EXACERBATION (HCC): Primary | ICD-10-CM

## 2023-12-14 RX ORDER — BUDESONIDE, GLYCOPYRROLATE, AND FORMOTEROL FUMARATE 160; 9; 4.8 UG/1; UG/1; UG/1
2 AEROSOL, METERED RESPIRATORY (INHALATION) 2 TIMES DAILY
Qty: 10.7 G | Refills: 3 | Status: SHIPPED | OUTPATIENT
Start: 2023-12-14

## 2024-04-25 DIAGNOSIS — J44.9 MODERATE COPD (CHRONIC OBSTRUCTIVE PULMONARY DISEASE) (HCC): ICD-10-CM

## 2024-04-26 RX ORDER — ALBUTEROL SULFATE 2.5 MG/3ML
SOLUTION RESPIRATORY (INHALATION)
Qty: 360 ML | Refills: 5 | Status: SHIPPED | OUTPATIENT
Start: 2024-04-26

## 2024-05-17 DIAGNOSIS — J44.1 CHRONIC OBSTRUCTIVE PULMONARY DISEASE WITH ACUTE EXACERBATION (HCC): ICD-10-CM

## 2024-05-17 RX ORDER — BUDESONIDE, GLYCOPYRROLATE, AND FORMOTEROL FUMARATE 160; 9; 4.8 UG/1; UG/1; UG/1
2 AEROSOL, METERED RESPIRATORY (INHALATION) 2 TIMES DAILY
Qty: 10.7 G | Refills: 5 | Status: SHIPPED | OUTPATIENT
Start: 2024-05-17

## 2024-05-22 ENCOUNTER — RA CDI HCC (OUTPATIENT)
Dept: OTHER | Facility: HOSPITAL | Age: 64
End: 2024-05-22

## 2024-05-30 ENCOUNTER — APPOINTMENT (OUTPATIENT)
Dept: LAB | Facility: CLINIC | Age: 64
End: 2024-05-30
Payer: MEDICARE

## 2024-05-30 ENCOUNTER — OFFICE VISIT (OUTPATIENT)
Dept: FAMILY MEDICINE CLINIC | Facility: CLINIC | Age: 64
End: 2024-05-30
Payer: MEDICARE

## 2024-05-30 VITALS
TEMPERATURE: 98.2 F | WEIGHT: 148 LBS | SYSTOLIC BLOOD PRESSURE: 148 MMHG | OXYGEN SATURATION: 95 % | HEIGHT: 62 IN | DIASTOLIC BLOOD PRESSURE: 90 MMHG | HEART RATE: 73 BPM | BODY MASS INDEX: 27.23 KG/M2 | RESPIRATION RATE: 16 BRPM

## 2024-05-30 DIAGNOSIS — E78.2 MIXED HYPERLIPIDEMIA: Chronic | ICD-10-CM

## 2024-05-30 DIAGNOSIS — I25.119 CORONARY ARTERY DISEASE INVOLVING NATIVE HEART WITH ANGINA PECTORIS, UNSPECIFIED VESSEL OR LESION TYPE (HCC): ICD-10-CM

## 2024-05-30 DIAGNOSIS — J44.9 MODERATE COPD (CHRONIC OBSTRUCTIVE PULMONARY DISEASE) (HCC): ICD-10-CM

## 2024-05-30 DIAGNOSIS — M79.10 MYALGIA: ICD-10-CM

## 2024-05-30 DIAGNOSIS — Z78.0 MENOPAUSE: ICD-10-CM

## 2024-05-30 DIAGNOSIS — E55.9 VITAMIN D DEFICIENCY: ICD-10-CM

## 2024-05-30 DIAGNOSIS — Z00.00 MEDICARE ANNUAL WELLNESS VISIT, SUBSEQUENT: Primary | ICD-10-CM

## 2024-05-30 LAB
25(OH)D3 SERPL-MCNC: 27 NG/ML (ref 30–100)
ALBUMIN SERPL BCP-MCNC: 4.4 G/DL (ref 3.5–5)
ALP SERPL-CCNC: 120 U/L (ref 34–104)
ALT SERPL W P-5'-P-CCNC: 27 U/L (ref 7–52)
ANION GAP SERPL CALCULATED.3IONS-SCNC: 10 MMOL/L (ref 4–13)
AST SERPL W P-5'-P-CCNC: 26 U/L (ref 13–39)
BILIRUB SERPL-MCNC: 0.45 MG/DL (ref 0.2–1)
BUN SERPL-MCNC: 8 MG/DL (ref 5–25)
CALCIUM SERPL-MCNC: 9.4 MG/DL (ref 8.4–10.2)
CHLORIDE SERPL-SCNC: 102 MMOL/L (ref 96–108)
CHOLEST SERPL-MCNC: 258 MG/DL
CK SERPL-CCNC: 103 U/L (ref 26–192)
CO2 SERPL-SCNC: 28 MMOL/L (ref 21–32)
CREAT SERPL-MCNC: 0.63 MG/DL (ref 0.6–1.3)
ERYTHROCYTE [DISTWIDTH] IN BLOOD BY AUTOMATED COUNT: 12.8 % (ref 11.6–15.1)
GFR SERPL CREATININE-BSD FRML MDRD: 94 ML/MIN/1.73SQ M
GLUCOSE P FAST SERPL-MCNC: 101 MG/DL (ref 65–99)
HCT VFR BLD AUTO: 55 % (ref 34.8–46.1)
HDLC SERPL-MCNC: 46 MG/DL
HGB BLD-MCNC: 18.4 G/DL (ref 11.5–15.4)
LDLC SERPL CALC-MCNC: 168 MG/DL (ref 0–100)
MCH RBC QN AUTO: 30.5 PG (ref 26.8–34.3)
MCHC RBC AUTO-ENTMCNC: 33.5 G/DL (ref 31.4–37.4)
MCV RBC AUTO: 91 FL (ref 82–98)
PLATELET # BLD AUTO: 182 THOUSANDS/UL (ref 149–390)
PMV BLD AUTO: 14 FL (ref 8.9–12.7)
POTASSIUM SERPL-SCNC: 4.3 MMOL/L (ref 3.5–5.3)
PROT SERPL-MCNC: 7.4 G/DL (ref 6.4–8.4)
RBC # BLD AUTO: 6.04 MILLION/UL (ref 3.81–5.12)
SODIUM SERPL-SCNC: 140 MMOL/L (ref 135–147)
TRIGL SERPL-MCNC: 222 MG/DL
TSH SERPL DL<=0.05 MIU/L-ACNC: 2.12 UIU/ML (ref 0.45–4.5)
WBC # BLD AUTO: 8.54 THOUSAND/UL (ref 4.31–10.16)

## 2024-05-30 PROCEDURE — 36415 COLL VENOUS BLD VENIPUNCTURE: CPT

## 2024-05-30 PROCEDURE — 84443 ASSAY THYROID STIM HORMONE: CPT

## 2024-05-30 PROCEDURE — 80053 COMPREHEN METABOLIC PANEL: CPT

## 2024-05-30 PROCEDURE — G0402 INITIAL PREVENTIVE EXAM: HCPCS | Performed by: FAMILY MEDICINE

## 2024-05-30 PROCEDURE — 80061 LIPID PANEL: CPT

## 2024-05-30 PROCEDURE — 99214 OFFICE O/P EST MOD 30 MIN: CPT | Performed by: FAMILY MEDICINE

## 2024-05-30 PROCEDURE — 82550 ASSAY OF CK (CPK): CPT

## 2024-05-30 PROCEDURE — 85027 COMPLETE CBC AUTOMATED: CPT

## 2024-05-30 PROCEDURE — 82306 VITAMIN D 25 HYDROXY: CPT

## 2024-05-30 RX ORDER — IRBESARTAN 75 MG/1
75 TABLET ORAL DAILY
Qty: 30 TABLET | Refills: 5 | Status: SHIPPED | OUTPATIENT
Start: 2024-05-30

## 2024-05-30 RX ORDER — TIOTROPIUM BROMIDE INHALATION SPRAY 1.56 UG/1
2 SPRAY, METERED RESPIRATORY (INHALATION) DAILY
Qty: 4 G | Refills: 5 | Status: SHIPPED | OUTPATIENT
Start: 2024-05-30

## 2024-05-30 RX ORDER — FLUTICASONE PROPIONATE AND SALMETEROL 250; 50 UG/1; UG/1
1 POWDER RESPIRATORY (INHALATION) 2 TIMES DAILY
Qty: 60 BLISTER | Refills: 5 | Status: SHIPPED | OUTPATIENT
Start: 2024-05-30 | End: 2024-11-26

## 2024-05-30 NOTE — PATIENT INSTRUCTIONS
Medicare Preventive Visit Patient Instructions  Thank you for completing your Welcome to Medicare Visit or Medicare Annual Wellness Visit today. Your next wellness visit will be due in one year (5/31/2025).  The screening/preventive services that you may require over the next 5-10 years are detailed below. Some tests may not apply to you based off risk factors and/or age. Screening tests ordered at today's visit but not completed yet may show as past due. Also, please note that scanned in results may not display below.  Preventive Screenings:  Service Recommendations Previous Testing/Comments   Colorectal Cancer Screening  * Colonoscopy    * Fecal Occult Blood Test (FOBT)/Fecal Immunochemical Test (FIT)  * Fecal DNA/Cologuard Test  * Flexible Sigmoidoscopy Age: 45-75 years old   Colonoscopy: every 10 years (may be performed more frequently if at higher risk)  OR  FOBT/FIT: every 1 year  OR  Cologuard: every 3 years  OR  Sigmoidoscopy: every 5 years  Screening may be recommended earlier than age 45 if at higher risk for colorectal cancer. Also, an individualized decision between you and your healthcare provider will decide whether screening between the ages of 76-85 would be appropriate. Colonoscopy: 11/30/2017  FOBT/FIT: Not on file  Cologuard: Not on file  Sigmoidoscopy: Not on file    Screening Current     Breast Cancer Screening Age: 40+ years old  Frequency: every 1-2 years  Not required if history of left and right mastectomy Mammogram: 07/05/2023    Screening Current   Cervical Cancer Screening Between the ages of 21-29, pap smear recommended once every 3 years.   Between the ages of 30-65, can perform pap smear with HPV co-testing every 5 years.   Recommendations may differ for women with a history of total hysterectomy, cervical cancer, or abnormal pap smears in past. Pap Smear: Not on file    Screening Not Indicated   Hepatitis C Screening Once for adults born between 1945 and 1965  More frequently in  patients at high risk for Hepatitis C Hep C Antibody: 09/17/2020    Screening Current   Diabetes Screening 1-2 times per year if you're at risk for diabetes or have pre-diabetes Fasting glucose: 109 mg/dL (4/27/2023)  A1C: 5.9 % (4/27/2023)      Cholesterol Screening Once every 5 years if you don't have a lipid disorder. May order more often based on risk factors. Lipid panel: 11/14/2023    Screening Not Indicated  History Lipid Disorder     Other Preventive Screenings Covered by Medicare:  Abdominal Aortic Aneurysm (AAA) Screening: covered once if your at risk. You're considered to be at risk if you have a family history of AAA.  Lung Cancer Screening: covers low dose CT scan once per year if you meet all of the following conditions: (1) Age 55-77; (2) No signs or symptoms of lung cancer; (3) Current smoker or have quit smoking within the last 15 years; (4) You have a tobacco smoking history of at least 20 pack years (packs per day multiplied by number of years you smoked); (5) You get a written order from a healthcare provider.  Glaucoma Screening: covered annually if you're considered high risk: (1) You have diabetes OR (2) Family history of glaucoma OR (3)  aged 50 and older OR (4)  American aged 65 and older  Osteoporosis Screening: covered every 2 years if you meet one of the following conditions: (1) You're estrogen deficient and at risk for osteoporosis based off medical history and other findings; (2) Have a vertebral abnormality; (3) On glucocorticoid therapy for more than 3 months; (4) Have primary hyperparathyroidism; (5) On osteoporosis medications and need to assess response to drug therapy.   Last bone density test (DXA Scan): Not on file.  HIV Screening: covered annually if you're between the age of 15-65. Also covered annually if you are younger than 15 and older than 65 with risk factors for HIV infection. For pregnant patients, it is covered up to 3 times per  pregnancy.    Immunizations:  Immunization Recommendations   Influenza Vaccine Annual influenza vaccination during flu season is recommended for all persons aged >= 6 months who do not have contraindications   Pneumococcal Vaccine   * Pneumococcal conjugate vaccine = PCV13 (Prevnar 13), PCV15 (Vaxneuvance), PCV20 (Prevnar 20)  * Pneumococcal polysaccharide vaccine = PPSV23 (Pneumovax) Adults 19-63 yo with certain risk factors or if 65+ yo  If never received any pneumonia vaccine: recommend Prevnar 20 (PCV20)  Give PCV20 if previously received 1 dose of PCV13 or PPSV23   Hepatitis B Vaccine 3 dose series if at intermediate or high risk (ex: diabetes, end stage renal disease, liver disease)   Respiratory syncytial virus (RSV) Vaccine - COVERED BY MEDICARE PART D  * RSVPreF3 (Arexvy) CDC recommends that adults 60 years of age and older may receive a single dose of RSV vaccine using shared clinical decision-making (SCDM)   Tetanus (Td) Vaccine - COST NOT COVERED BY MEDICARE PART B Following completion of primary series, a booster dose should be given every 10 years to maintain immunity against tetanus. Td may also be given as tetanus wound prophylaxis.   Tdap Vaccine - COST NOT COVERED BY MEDICARE PART B Recommended at least once for all adults. For pregnant patients, recommended with each pregnancy.   Shingles Vaccine (Shingrix) - COST NOT COVERED BY MEDICARE PART B  2 shot series recommended in those 19 years and older who have or will have weakened immune systems or those 50 years and older     Health Maintenance Due:      Topic Date Due   • HIV Screening  Never done   • Colorectal Cancer Screening  11/30/2022   • Lung Cancer Screening  07/31/2024   • Breast Cancer Screening: Mammogram  07/05/2025   • Hepatitis C Screening  Completed     Immunizations Due:      Topic Date Due   • Pneumococcal Vaccine: 65+ Years (2 of 2 - PCV) 11/02/2018   • COVID-19 Vaccine (1 - 2023-24 season) Never done     Advance Directives    What are advance directives?  Advance directives are legal documents that state your wishes and plans for medical care. These plans are made ahead of time in case you lose your ability to make decisions for yourself. Advance directives can apply to any medical decision, such as the treatments you want, and if you want to donate organs.   What are the types of advance directives?  There are many types of advance directives, and each state has rules about how to use them. You may choose a combination of any of the following:  Living will:  This is a written record of the treatment you want. You can also choose which treatments you do not want, which to limit, and which to stop at a certain time. This includes surgery, medicine, IV fluid, and tube feedings.   Durable power of  for healthcare (DPAHC):  This is a written record that states who you want to make healthcare choices for you when you are unable to make them for yourself. This person, called a proxy, is usually a family member or a friend. You may choose more than 1 proxy.  Do not resuscitate (DNR) order:  A DNR order is used in case your heart stops beating or you stop breathing. It is a request not to have certain forms of treatment, such as CPR. A DNR order may be included in other types of advance directives.  Medical directive:  This covers the care that you want if you are in a coma, near death, or unable to make decisions for yourself. You can list the treatments you want for each condition. Treatment may include pain medicine, surgery, blood transfusions, dialysis, IV or tube feedings, and a ventilator (breathing machine).  Values history:  This document has questions about your views, beliefs, and how you feel and think about life. This information can help others choose the care that you would choose.  Why are advance directives important?  An advance directive helps you control your care. Although spoken wishes may be used, it is better to  have your wishes written down. Spoken wishes can be misunderstood, or not followed. Treatments may be given even if you do not want them. An advance directive may make it easier for your family to make difficult choices about your care.   Cigarette Smoking and Your Health   Risks to your health if you smoke:  Nicotine and other chemicals found in tobacco damage every cell in your body. Even if you are a light smoker, you have an increased risk for cancer, heart disease, and lung disease. If you are pregnant or have diabetes, smoking increases your risk for complications.   Benefits to your health if you stop smoking:   You decrease respiratory symptoms such as coughing, wheezing, and shortness of breath.   You reduce your risk for cancers of the lung, mouth, throat, kidney, bladder, pancreas, stomach, and cervix. If you already have cancer, you increase the benefits of chemotherapy. You also reduce your risk for cancer returning or a second cancer from developing.   You reduce your risk for heart disease, blood clots, heart attack, and stroke.   You reduce your risk for lung infections, and diseases such as pneumonia, asthma, chronic bronchitis, and emphysema.  Your circulation improves. More oxygen can be delivered to your body. If you have diabetes, you lower your risk for complications, such as kidney, artery, and eye diseases. You also lower your risk for nerve damage. Nerve damage can lead to amputations, poor vision, and blindness.  You improve your body's ability to heal and to fight infections.  For more information and support to stop smoking:   [place]  Phone: 1- 669 - 086-2022  Web Address: www.[employer].Tingz  Weight Management   Why it is important to manage your weight:  Being overweight increases your risk of health conditions such as heart disease, high blood pressure, type 2 diabetes, and certain types of cancer. It can also increase your risk for osteoarthritis, sleep apnea, and other  respiratory problems. Aim for a slow, steady weight loss. Even a small amount of weight loss can lower your risk of health problems.  How to lose weight safely:  A safe and healthy way to lose weight is to eat fewer calories and get regular exercise. You can lose up about 1 pound a week by decreasing the number of calories you eat by 500 calories each day.   Healthy meal plan for weight management:  A healthy meal plan includes a variety of foods, contains fewer calories, and helps you stay healthy. A healthy meal plan includes the following:  Eat whole-grain foods more often.  A healthy meal plan should contain fiber. Fiber is the part of grains, fruits, and vegetables that is not broken down by your body. Whole-grain foods are healthy and provide extra fiber in your diet. Some examples of whole-grain foods are whole-wheat breads and pastas, oatmeal, brown rice, and bulgur.  Eat a variety of vegetables every day.  Include dark, leafy greens such as spinach, kale, andrew greens, and mustard greens. Eat yellow and orange vegetables such as carrots, sweet potatoes, and winter squash.   Eat a variety of fruits every day.  Choose fresh or canned fruit (canned in its own juice or light syrup) instead of juice. Fruit juice has very little or no fiber.  Eat low-fat dairy foods.  Drink fat-free (skim) milk or 1% milk. Eat fat-free yogurt and low-fat cottage cheese. Try low-fat cheeses such as mozzarella and other reduced-fat cheeses.  Choose meat and other protein foods that are low in fat.  Choose beans or other legumes such as split peas or lentils. Choose fish, skinless poultry (chicken or turkey), or lean cuts of red meat (beef or pork). Before you cook meat or poultry, cut off any visible fat.   Use less fat and oil.  Try baking foods instead of frying them. Add less fat, such as margarine, sour cream, regular salad dressing and mayonnaise to foods. Eat fewer high-fat foods. Some examples of high-fat foods include  french fries, doughnuts, ice cream, and cakes.  Eat fewer sweets.  Limit foods and drinks that are high in sugar. This includes candy, cookies, regular soda, and sweetened drinks.  Exercise:  Exercise at least 30 minutes per day on most days of the week. Some examples of exercise include walking, biking, dancing, and swimming. You can also fit in more physical activity by taking the stairs instead of the elevator or parking farther away from stores. Ask your healthcare provider about the best exercise plan for you.      © Copyright [employer] 2018 Information is for End User's use only and may not be sold, redistributed or otherwise used for commercial purposes. All illustrations and images included in CareNotes® are the copyrighted property of [employer]., Inc[employer] or [employer]

## 2024-05-30 NOTE — PROGRESS NOTES
Ambulatory Visit  Name: Natalia Horn      : 1960      MRN: [id number]  Encounter Provider: Mercedes Friedman MD  Encounter Date: 2024   Encounter department: [facility]    Assessment & Plan   1. Medicare annual wellness visit, subsequent  2. 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  Written prescriptions provided.  Patient will contact me pending coverage of her new prescription plan.  Orders:  -     tiotropium (Spiriva Respimat) 1.25 MCG/ACT AERS inhaler; Inhale 2 puffs daily  -     Fluticasone-Salmeterol (Advair) 250-50 mcg/dose inhaler; Inhale 1 puff 2 (two) times a day Rinse mouth after use.  3. Coronary artery disease involving native heart with angina pectoris, unspecified vessel or lesion type (HCC)  Assessment & Plan:  No complaints of angina.  Patient has been off statin and verapamil..  Proceed with blood work.  Blood pressure is elevated.  Start Avapro 75 mg daily  Orders:  -     irbesartan (AVAPRO) 75 mg tablet; Take 1 tablet (75 mg total) by mouth daily  4. Mixed hyperlipidemia  5. Menopause  -     DXA bone density spine hip and pelvis; Future; Expected date: 2024  6. Myalgia  -     CK; Future  7. Vitamin D deficiency  -     Vitamin D 25 hydroxy; Future    Proceed with blood work now.  Patient has been off Vytorin for a while, reassess lipids, discuss possible restart of statin therapy due to history of dyslipidemia and CAD.   Strongly advised her to consider pneumonia and shingles vaccine, she should proceed at the pharmacy.    Pending CT chest after 2024, followed by [facility] pulmonology     Health maintenance: Due for DEXA scan and colonoscopy.  Patient has my original referral to [facility], Dr. Molina    Return in about 18 weeks (around 10/3/2024).    Depression Screening and Follow-up Plan: Patient was screened for depression during today's  encounter. They screened negative with a PHQ-9 score of 0.      Preventive health issues were discussed with patient, and age appropriate screening tests were ordered as noted in patient's After Visit Summary. Personalized health advice and appropriate referrals for health education or preventive services given if needed, as noted in patient's After Visit Summary.    History of Present Illness     Follow up and  Medicare Wellness  [employer] Sliver script,[facility]  Daily Rx- asa and Breztri  Patient has been paying over $400 for Breztri prescription   She is open to change her prescriptions to minimize cost.  Recalls that Spiriva was the most effective to control chronic cough and COPD symptoms.      Chronic reflux: Patient has been off PPI   OPD: Smokes half a pack daily, follows with [facility] pulmonology.  Due for CT chest after July 31, 2024     CAD: Off verapamil for a while.  Previous dose was 240 mg daily.  Patient also stopped statin due to chronic myalgias.         Patient Care Team:  Mercedes Friedman MD as PCP - General  Mercedes Friedman MD as PCP - PCP-[facility] Attributed-[facility]  Esme Rousseau, DO Fabián Elkins, MD Aris Billy MD Amanda N Martin, Pharmacist as Pharmacist (Pharmacy)    Review of Systems   Constitutional: Negative.    HENT: Negative.     Eyes: Negative.    Respiratory:  Positive for cough and wheezing.         Chronic symptoms   Cardiovascular: Negative.  Negative for chest pain, palpitations and leg swelling.   Gastrointestinal: Negative.    Endocrine: Negative.    Genitourinary: Negative.    Musculoskeletal:  Positive for myalgias.   Allergic/Immunologic: Negative.    Neurological: Negative.    Hematological: Negative.    Psychiatric/Behavioral: Negative.       Medical History Reviewed by provider this encounter:  Tobacco  Allergies  Meds  Problems  Med Hx  Surg Hx  Fam Hx       Annual Wellness Visit Questionnaire   Natalia is here for  her Initial Wellness visit.     Health Risk Assessment:   Patient rates overall health as good. Patient feels that their physical health rating is same. Patient is satisfied with their life. Eyesight was rated as same. Hearing was rated as same. Patient feels that their emotional and mental health rating is same. Patients states they are sometimes angry. Patient states they are never, rarely unusually tired/fatigued. Pain experienced in the last 7 days has been some. Patient's pain rating has been 10/10. Patient states that she has experienced no weight loss or gain in last 6 months. Only pain is when she has a spasm     Depression Screening:   PHQ-9 Score: 0      Fall Risk Screening:   In the past year, patient has experienced: no history of falling in past year      Urinary Incontinence Screening:   Patient has not leaked urine accidently in the last six months.     Home Safety:  Patient does not have trouble with stairs inside or outside of their home. Patient has working smoke alarms and has working carbon monoxide detector. Home safety hazards include: none.     Nutrition:   Current diet is Regular.     Medications:   Patient is currently taking over-the-counter supplements. OTC medications include: see medication list. Patient is able to manage medications.     Activities of Daily Living (ADLs)/Instrumental Activities of Daily Living (IADLs):   Walk and transfer into and out of bed and chair?: Yes  Dress and groom yourself?: Yes    Bathe or shower yourself?: Yes    Feed yourself? Yes  Do your laundry/housekeeping?: Yes  Manage your money, pay your bills and track your expenses?: Yes  Make your own meals?: Yes    Do your own shopping?: Yes    Previous Hospitalizations:   Any hospitalizations or ED visits within the last 12 months?: No      Advance Care Planning:   Living will: No    Durable POA for healthcare: No    Advanced directive: No    Advanced directive counseling given: Yes    ACP document given: Yes       Cognitive Screening:   Provider or family/friend/caregiver concerned regarding cognition?: No    PREVENTIVE SCREENINGS      Cardiovascular Screening:    General: Screening Not Indicated and History Lipid Disorder      Diabetes Screening:     General: Screening Current    Due for: Blood Glucose      Colorectal Cancer Screening:     General: Screening Current    Due for: Colonoscopy - High Risk      Breast Cancer Screening:     General: Screening Current      Cervical Cancer Screening:    General: Screening Not Indicated      Osteoporosis Screening:    General: Risks and Benefits Discussed    Due for: DXA Axial and DXA Appendicular      Lung Cancer Screening:     General: Screening Current      Hepatitis C Screening:    General: Screening Current    Screening, Brief Intervention, and Referral to Treatment (SBIRT)    Screening      Single Item Drug Screening:  How often have you used an illegal drug (including marijuana) or a prescription medication for non-medical reasons in the past year? never    Single Item Drug Screen Score: 0  Interpretation: Negative screen for possible drug use disorder    Brief Intervention  Alcohol & drug use screenings were reviewed. No concerns regarding substance use disorder identified.     Other Counseling Topics:   Regular weightbearing exercise.     Social Determinants of Health     Food Insecurity: No Food Insecurity (5/30/2024)    Hunger Vital Sign    • Worried About Running Out of Food in the Last Year: Never true    • Ran Out of Food in the Last Year: Never true   Transportation Needs: No Transportation Needs (5/30/2024)    PRAPARE - Transportation    • Lack of Transportation (Medical): No    • Lack of Transportation (Non-Medical): No   Housing Stability: Low Risk  (5/30/2024)    Housing Stability Vital Sign    • Unable to Pay for Housing in the Last Year: No    • Number of Times Moved in the Last Year: 1    • Homeless in the Last Year: No   Utilities: Not At Risk (5/30/2024)  "   [facility] Utilities    • Threatened with loss of utilities: No     No results found.    Objective     /90 (BP Location: Left arm, Patient Position: Sitting, Cuff Size: Standard)   Pulse 73   Temp 98.2 °F (36.8 °C) (Temporal)   Resp 16   Ht 5' 2\" (1.575 m)   Wt 67.1 kg (148 lb)   SpO2 95%   BMI 27.07 kg/m²     Physical Exam  Vitals and nursing note reviewed.   Constitutional:       General: She is not in acute distress.     Appearance: Normal appearance. She is well-developed. She is not ill-appearing.   HENT:      Head: Normocephalic and atraumatic.   Eyes:      Conjunctiva/sclera: Conjunctivae normal.   Neck:      Thyroid: No thyromegaly.      Vascular: No carotid bruit.   Cardiovascular:      Rate and Rhythm: Normal rate and regular rhythm.      Heart sounds: Normal heart sounds. No murmur heard.  Pulmonary:      Effort: Pulmonary effort is normal. No respiratory distress.      Breath sounds: Normal breath sounds. No wheezing.   Abdominal:      General: There is no distension or abdominal bruit.      Palpations: Abdomen is soft.      Tenderness: There is no abdominal tenderness.      Hernia: No hernia is present.   Musculoskeletal:         General: Normal range of motion.      Cervical back: Neck supple.   Neurological:      General: No focal deficit present.      Mental Status: She is alert and oriented to person, place, and time.      Cranial Nerves: No cranial nerve deficit.      Coordination: Coordination normal.   Psychiatric:         Mood and Affect: Mood normal.         Behavior: Behavior normal.         Thought Content: Thought content normal.       Administrative Statements           "

## 2024-06-02 NOTE — ASSESSMENT & PLAN NOTE
No complaints of angina.  Patient has been off statin and verapamil..  Proceed with blood work.  Blood pressure is elevated.  Start Avapro 75 mg daily

## 2024-06-02 NOTE — ASSESSMENT & PLAN NOTE
Start combination of Advair and Spiriva.  Written prescriptions provided.  Patient will contact me pending coverage of her new prescription plan.

## 2024-06-04 ENCOUNTER — TELEPHONE (OUTPATIENT)
Dept: FAMILY MEDICINE CLINIC | Facility: CLINIC | Age: 64
End: 2024-06-04

## 2024-06-04 DIAGNOSIS — E78.2 MIXED HYPERLIPIDEMIA: Chronic | ICD-10-CM

## 2024-06-04 DIAGNOSIS — J44.9 MODERATE COPD (CHRONIC OBSTRUCTIVE PULMONARY DISEASE) (HCC): ICD-10-CM

## 2024-06-04 DIAGNOSIS — K82.4 GALLBLADDER POLYP: Primary | ICD-10-CM

## 2024-06-04 DIAGNOSIS — R71.8 ELEVATED HEMATOCRIT: ICD-10-CM

## 2024-06-04 NOTE — TELEPHONE ENCOUNTER
Please contact the patient regarding recent blood work results.      Her cholesterol is high.  I recommend to try  lipid-lowering medication.  Please ask patient which pharmacy should I use for her prescription.    One of her liver tests is slightly elevated.  I recommend to schedule liver/gallbladder ultrasound.  Patient with history of gallbladder polyps, so we will continue monitoring.  Order placed.    Thank you  I will be awaiting for pharmacy information

## 2024-06-04 NOTE — TELEPHONE ENCOUNTER
"Called and spoke with patient and rely BW results and provider message/instructions. Patient states \" Thank you\" and verbalized understanding. Central Scheduling phone number provided, patient will call and schedule today  "

## 2024-06-05 RX ORDER — LOVASTATIN 10 MG/1
10 TABLET ORAL
Qty: 30 TABLET | Refills: 3 | Status: SHIPPED | OUTPATIENT
Start: 2024-06-05

## 2024-06-05 NOTE — TELEPHONE ENCOUNTER
Please contact the patient.  I sent prescription for lovastatin, cholesterol medication, to [facility] pharmacy.    I will place the orders to repeat blood work prior to her next office visit.  Thank you

## 2024-06-06 ENCOUNTER — HOSPITAL ENCOUNTER (OUTPATIENT)
Dept: ULTRASOUND IMAGING | Facility: HOSPITAL | Age: 64
Discharge: HOME | End: 2024-06-06
Payer: MEDICARE

## 2024-06-06 DIAGNOSIS — K82.4 GALLBLADDER POLYP: ICD-10-CM

## 2024-06-06 PROCEDURE — 76705 ECHO EXAM OF ABDOMEN: CPT

## 2024-06-19 ENCOUNTER — TELEPHONE (OUTPATIENT)
Dept: FAMILY MEDICINE CLINIC | Facility: CLINIC | Age: 64
End: 2024-06-19

## 2024-06-19 NOTE — TELEPHONE ENCOUNTER
Please contact the patient.  I reviewed results of recent liver/gallbladder ultrasound.    Happy to report that there are no concerning findings    Radiologist reports finding of 2 unchanged small gallbladder polyps that are of no concern and do not require any further follow-up.  I suspect that liver function elevation was related to high cholesterol.    Thank you

## 2024-06-26 ENCOUNTER — HOSPITAL ENCOUNTER (OUTPATIENT)
Facility: HOSPITAL | Age: 64
Discharge: HOME | End: 2024-06-26
Payer: MEDICARE

## 2024-06-26 VITALS — WEIGHT: 146 LBS | HEIGHT: 62 IN | BODY MASS INDEX: 26.87 KG/M2

## 2024-06-26 DIAGNOSIS — Z78.0 MENOPAUSE: ICD-10-CM

## 2024-06-26 PROCEDURE — 77080 DXA BONE DENSITY AXIAL: CPT

## 2024-06-27 ENCOUNTER — TELEPHONE (OUTPATIENT)
Dept: FAMILY MEDICINE CLINIC | Facility: CLINIC | Age: 64
End: 2024-06-27

## 2024-06-27 DIAGNOSIS — M81.0 AGE-RELATED OSTEOPOROSIS WITHOUT CURRENT PATHOLOGICAL FRACTURE: Primary | ICD-10-CM

## 2024-06-27 NOTE — TELEPHONE ENCOUNTER
Please contact the patient.  Recent bone density scan reveals osteoporosis of lumbar spine.  I recommend to schedule evaluation with [facility] endocrinology to discuss treatment options.  Referral placed.  Thank you

## 2024-06-28 NOTE — TELEPHONE ENCOUNTER
Called and spoke with patient in regards of her Bone Density Scan results and provider message/instructions. Information provided and phone number. Patient verbalize understanding

## 2024-07-31 ENCOUNTER — HOSPITAL ENCOUNTER (OUTPATIENT)
Dept: CT IMAGING | Facility: HOSPITAL | Age: 64
Discharge: HOME | End: 2024-07-31
Payer: MEDICARE

## 2024-07-31 DIAGNOSIS — R91.1 SOLITARY PULMONARY NODULE: ICD-10-CM

## 2024-07-31 PROCEDURE — G1004 CDSM NDSC: HCPCS

## 2024-07-31 PROCEDURE — 71250 CT THORAX DX C-: CPT

## 2024-08-05 ENCOUNTER — OFFICE VISIT (OUTPATIENT)
Dept: FAMILY MEDICINE CLINIC | Facility: CLINIC | Age: 64
End: 2024-08-05
Payer: MEDICARE

## 2024-08-05 VITALS
OXYGEN SATURATION: 93 % | HEIGHT: 62 IN | WEIGHT: 147 LBS | SYSTOLIC BLOOD PRESSURE: 120 MMHG | RESPIRATION RATE: 16 BRPM | HEART RATE: 76 BPM | BODY MASS INDEX: 27.05 KG/M2 | TEMPERATURE: 97.8 F | DIASTOLIC BLOOD PRESSURE: 70 MMHG

## 2024-08-05 DIAGNOSIS — M79.10 MYALGIA: ICD-10-CM

## 2024-08-05 DIAGNOSIS — J01.90 ACUTE NON-RECURRENT SINUSITIS, UNSPECIFIED LOCATION: Primary | ICD-10-CM

## 2024-08-05 DIAGNOSIS — R91.1 SOLITARY PULMONARY NODULE: ICD-10-CM

## 2024-08-05 DIAGNOSIS — J44.9 MODERATE COPD (CHRONIC OBSTRUCTIVE PULMONARY DISEASE) (HCC): ICD-10-CM

## 2024-08-05 PROCEDURE — 99214 OFFICE O/P EST MOD 30 MIN: CPT | Performed by: FAMILY MEDICINE

## 2024-08-05 PROCEDURE — G2211 COMPLEX E/M VISIT ADD ON: HCPCS | Performed by: FAMILY MEDICINE

## 2024-08-05 RX ORDER — ALBUTEROL SULFATE 90 UG/1
2 POWDER, METERED RESPIRATORY (INHALATION) 4 TIMES DAILY PRN
Qty: 1 EACH | Refills: 3 | Status: SHIPPED | OUTPATIENT
Start: 2024-08-05

## 2024-08-05 RX ORDER — CODEINE PHOSPHATE/GUAIFENESIN 10-100MG/5
LIQUID (ML) ORAL
Qty: 180 ML | Refills: 0 | Status: SHIPPED | OUTPATIENT
Start: 2024-08-05

## 2024-08-05 RX ORDER — AMOXICILLIN AND CLAVULANATE POTASSIUM 875; 125 MG/1; MG/1
1 TABLET, FILM COATED ORAL 2 TIMES DAILY
Qty: 20 TABLET | Refills: 0 | Status: SHIPPED | OUTPATIENT
Start: 2024-08-05 | End: 2024-08-15

## 2024-08-05 NOTE — PROGRESS NOTES
Ambulatory Visit  Name: Natalia Horn      : 1960      MRN: [id number]  Encounter Provider: Mercedes Friedman MD  Encounter Date: 2024   Encounter department: [facility]    Assessment & Plan   1. Acute non-recurrent sinusitis, unspecified location  -     amoxicillin-clavulanate (AUGMENTIN) 875-125 mg per tablet; Take 1 tablet by mouth 2 (two) times a day for 10 days  -     guaifenesin-codeine (GUAIFENESIN AC) 100-10 MG/5ML liquid; Take 10 mL every 4-6 hours as needed for cough  2. 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  Spiriva was expensive.  Patient uses albuterol inhaler and a nebulizer PRN.  She follows with [facility] pulmonology for annual CT chest.  Results reviewed today.  Pulmonology recommends annual follow-up.  Orders:  -     Albuterol Sulfate (ProAir RespiClick) 108 (90 Base) MCG/ACT AEPB; Inhale 2 puffs 4 (four) times a day as needed (wheezing and cough)  3. Myalgia  Assessment & Plan:  Patient reports generalized symptoms of myalgias and muscle cramping associated with range of motion.  Recent calcium and CPK levels were normal.  Routine labs performed 2 months ago were unremarkable.  Patient reports low back pain radiating to the right leg representing sciatica but her biggest concern is abdominal wall muscle spasm.    Proceed with blood work as outlined  Consider EMG and imaging as the next diagnostic steps.  Oral lumbar spine performed in  for evaluation of sciatica was normal    Orders:  -     GABRIELLE Screen w/ Reflex to Titer/Pattern; Future  -     Anti-DNA antibody, double-stranded; Future  -     C-reactive protein; Future  -     Protein electrophoresis, serum; Future  -     Cyclic citrul peptide antibody, IgG; Future  -     RF Screen w/ Reflex to Titer; Future  -     Sedimentation rate, automated; Future  -     Sjogren's Antibodies;  Future  -     Lyme Total AB W Reflex to IGM/IGG; Future  -     Aldolase; Future  4. Solitary pulmonary nodule  Assessment & Plan:  CT chest July 2024  1. Slight interval enlargement of the dominant left upper lobe nodule. However, given the macroscopic fat, this is again consistent with a hamartoma. The remaining nodules are unchanged. However, given the semisolid left upper lobe nodule, continued   annual surveillance is recommended    Patient is under care of pulmonology.  They recommend follow-up CT chest in 1 year     Patient is requesting a new nebulizer kit.  We will place DME  orders.     History of Present Illness     Patient presents for evaluation of sinus symptoms.  She developed sinus headache on Wednesday, July 31.  No she is complaining of worsening of sore throat and cough.  She is fatigued but denies symptoms of fever.  No generalized achiness or flulike symptoms.  Patient noticed some wheezing but denies any unusual shortness of breath.  Bad odor from the nose.  Cough is dry, occasional yellow mucus expectoration.  Poor sleep at night due to cough.     The patient is complaining of  generalized muscle spasms and cramping.  She is complaining of low back pain rating to the right leg but her biggest concern is spasms of her abdominal and back muscles with turning or movement.  She has no abdominal pain per se but develops immediate muscle spasms any range of motion (bending, turning, getting up).  Recent routine blood work performed on May 30 was essentially unremarkable aside from elevated cholesterol.  Specifically calcium level and CPK were normal.      Review of Systems   Constitutional: Negative.    HENT:  Positive for congestion and sore throat.    Eyes: Negative.    Respiratory:  Positive for cough and wheezing.    Cardiovascular: Negative.    Endocrine: Negative.    Genitourinary: Negative.    Musculoskeletal:  Positive for myalgias.   Allergic/Immunologic: Negative.    Neurological: Negative.     Psychiatric/Behavioral: Negative.       Past Medical History:   Diagnosis Date   • COPD (chronic obstructive pulmonary disease) ([facility])    • GERD (gastroesophageal reflux disease)    • Hypercholesterolemia    • Iliotibial band tendonitis     Last Assessed: 2015   • Major depressive disorder, recurrent ([facility]) 2013    Per CMS ICD 10 Guidelines--Per Physician     • Migraine    • Postprocedural pneumothorax 2021   • Prinzmetal angina ([facility])     Last Assessed: 2017   • Sciatica     Last Assessed: 11/10/2014   • Thrush of mouth and esophagus  ([facility]) 2021   • Tobacco abuse 2018   • Vertigo     Last Assessed: 2017     Past Surgical History:   Procedure Laterality Date   • COLONOSCOPY  2011    Complete: Dr. Jurado - due in 5 years, Colonoscopy 2017, Diverticulosis, 5 mm polyp, Due in 5 years   • ESOPHAGOGASTRODUODENOSCOPY      Diagnostic; Last Assessed: 2014   • FLEXOR TENDON REPAIR      left finger   • HYSTERECTOMY      @ agr 28   • IR CHEST TUBE PLACEMENT  2021   • OH LARYNGOSCOPY W/WO TRACHEOSCOPY DX EXCEPT  N/A 2019    Procedure: LARYNGOSCOPY DIRECT, FLEXIBLE BRONCHOSCOPY, FLEXIBLE ESOPHAGOSCOPY;  Surgeon: Jerry Hopper MD;  Location: [facility];  Service: ENT   • SHOULDER ARTHROSCOPY Right    • SHOULDER SURGERY     • TOTAL ABDOMINAL HYSTERECTOMY W/ BILATERAL SALPINGOOPHORECTOMY      endometriosis; Last Assessed: 2015     Family History   Problem Relation Age of Onset   • Lung cancer Mother 54   • Other Father         Dyslipidemia   • Diabetes Sister    • No Known Problems Sister    • No Known Problems Sister    • No Known Problems Daughter    • No Known Problems Daughter    • Ovarian cancer Maternal Grandmother 53   • No Known Problems Maternal Grandfather    • Lung cancer Paternal Grandmother    • No Known Problems Paternal Grandfather    • No Known Problems Maternal Aunt    • No Known Problems Paternal Aunt    • No Known Problems Paternal Aunt     • No Known Problems Paternal Aunt    • Hypertension Family    • Breast cancer Other         doesn't know age     Social History     Tobacco Use   • Smoking status: Every Day     Current packs/day: 0.00     Average packs/day: 0.5 packs/day for 51.5 years (25.7 ttl pk-yrs)     Types: Cigarettes     Start date: 1970     Last attempt to quit: 7/1/2021     Years since quitting: 3.1   • Smokeless tobacco: Never   Vaping Use   • Vaping status: Former   • Quit date: 7/1/2021   Substance and Sexual Activity   • Alcohol use: No   • Drug use: No   • Sexual activity: Not on file     Current Outpatient Medications on File Prior to Visit   Medication Sig   • albuterol (2.5 mg/3 mL) 0.083 % nebulizer solution NEBULIZE 1 VIAL EVERY 4 HOURS AS NEEDED FOR  WHEEZING OR SHORTNESS OF BREATH   • aspirin (ECOTRIN LOW STRENGTH) 81 mg EC tablet Take 1 tablet by mouth daily   • cyclobenzaprine (FLEXERIL) 5 mg tablet TAKE ONE TABLET BY MOUTH EVERY DAY AS NEEDED FOR MUSCLE SPASMS (BACK PAIN)   • fluticasone (FLONASE) 50 mcg/act nasal spray 2 sprays into each nostril daily   • Fluticasone-Salmeterol (Advair) 250-50 mcg/dose inhaler Inhale 1 puff 2 (two) times a day Rinse mouth after use.   • irbesartan (AVAPRO) 75 mg tablet Take 1 tablet (75 mg total) by mouth daily   • lovastatin (MEVACOR) 10 MG tablet Take 1 tablet (10 mg total) by mouth daily at bedtime   • nicotine (NICODERM CQ) 14 mg/24hr TD 24 hr patch Place 1 patch on the skin over 24 hours every 24 hours   • nitroglycerin (NITROSTAT) 0.4 mg SL tablet Place 1 tablet (0.4 mg total) under the tongue every 5 (five) minutes as needed for chest pain     Allergies   Allergen Reactions   • Darvon [Propoxyphene] Itching   • Crestor [Rosuvastatin] Myalgia     Immunization History   Administered Date(s) Administered   • INFLUENZA 08/20/2015, 11/02/2017   • Influenza Quadrivalent Preservative Free 3 years and older IM 08/17/2016, 11/02/2017   • Influenza, recombinant, quadrivalent,injectable,  "preservative free 10/30/2018, 10/02/2019, 10/26/2020   • Influenza, seasonal, injectable 1960, 09/01/2011, 01/24/2013   • Pneumococcal Polysaccharide PPV23 11/15/2006, 11/02/2017     Objective     /70 (BP Location: Left arm, Patient Position: Sitting, Cuff Size: Large)   Pulse 76   Temp 97.8 °F (36.6 °C) (Temporal)   Resp 16   Ht 5' 1.75\" (1.568 m)   Wt 66.7 kg (147 lb)   SpO2 93%   BMI 27.10 kg/m²     Physical Exam  Vitals and nursing note reviewed.   Constitutional:       General: She is not in acute distress.     Appearance: Normal appearance. She is well-developed. She is not ill-appearing.   HENT:      Head: Normocephalic and atraumatic.      Right Ear: Tympanic membrane normal.      Left Ear: Tympanic membrane normal.      Mouth/Throat:      Mouth: Mucous membranes are moist.      Pharynx: Posterior oropharyngeal erythema (cobblestoning) present. No oropharyngeal exudate.      Tonsils: 2+ on the right. 2+ on the left.   Eyes:      Conjunctiva/sclera: Conjunctivae normal.   Neck:      Thyroid: No thyromegaly.      Vascular: No carotid bruit.   Cardiovascular:      Rate and Rhythm: Normal rate and regular rhythm.      Heart sounds: Normal heart sounds. No murmur heard.  Pulmonary:      Effort: Pulmonary effort is normal. Prolonged expiration present. No respiratory distress.      Breath sounds: Normal breath sounds. No wheezing, rhonchi or rales.   Abdominal:      General: There is no abdominal bruit.   Musculoskeletal:         General: Normal range of motion.      Cervical back: Neck supple.   Neurological:      General: No focal deficit present.      Mental Status: She is alert and oriented to person, place, and time.      Cranial Nerves: No cranial nerve deficit.      Coordination: Coordination normal.   Psychiatric:         Mood and Affect: Mood normal.         Behavior: Behavior normal.         "

## 2024-08-08 LAB
DME PARACHUTE DELIVERY DATE ACTUAL: NORMAL
DME PARACHUTE DELIVERY DATE REQUESTED: NORMAL
DME PARACHUTE ITEM DESCRIPTION: NORMAL
DME PARACHUTE ORDER STATUS: NORMAL
DME PARACHUTE SUPPLIER NAME: NORMAL
DME PARACHUTE SUPPLIER PHONE: NORMAL

## 2024-08-08 NOTE — ASSESSMENT & PLAN NOTE
Symptoms are fairly well-controlled on Advair discus twice a day.  Spiriva was expensive.  Patient uses albuterol inhaler and a nebulizer PRN.  She follows with [facility] pulmonology for annual CT chest.  Results reviewed today.  Pulmonology recommends annual follow-up.

## 2024-08-08 NOTE — ASSESSMENT & PLAN NOTE
Patient reports generalized symptoms of myalgias and muscle cramping associated with range of motion.  Recent calcium and CPK levels were normal.  Routine labs performed 2 months ago were unremarkable.  Patient reports low back pain radiating to the right leg representing sciatica but her biggest concern is abdominal wall muscle spasm.    Proceed with blood work as outlined  Consider EMG and imaging as the next diagnostic steps.  Oral lumbar spine performed in 2015 for evaluation of sciatica was normal

## 2024-08-08 NOTE — ASSESSMENT & PLAN NOTE
CT chest July 2024  1. Slight interval enlargement of the dominant left upper lobe nodule. However, given the macroscopic fat, this is again consistent with a hamartoma. The remaining nodules are unchanged. However, given the semisolid left upper lobe nodule, continued   annual surveillance is recommended    Patient is under care of pulmonology.  They recommend follow-up CT chest in 1 year   36.8

## 2024-08-15 ENCOUNTER — APPOINTMENT (OUTPATIENT)
Dept: LAB | Facility: CLINIC | Age: 64
End: 2024-08-15
Payer: MEDICARE

## 2024-08-15 DIAGNOSIS — M79.10 MYALGIA: ICD-10-CM

## 2024-08-15 DIAGNOSIS — E78.2 MIXED HYPERLIPIDEMIA: Chronic | ICD-10-CM

## 2024-08-15 DIAGNOSIS — R71.8 ELEVATED HEMATOCRIT: ICD-10-CM

## 2024-08-15 LAB
ALBUMIN SERPL BCG-MCNC: 4.1 G/DL (ref 3.5–5)
ALP SERPL-CCNC: 118 U/L (ref 34–104)
ALT SERPL W P-5'-P-CCNC: 22 U/L (ref 7–52)
AST SERPL W P-5'-P-CCNC: 19 U/L (ref 13–39)
B BURGDOR IGG+IGM SER QL IA: NEGATIVE
BILIRUB DIRECT SERPL-MCNC: 0.06 MG/DL (ref 0–0.2)
BILIRUB SERPL-MCNC: 0.41 MG/DL (ref 0.2–1)
CHOLEST SERPL-MCNC: 239 MG/DL
CRP SERPL QL: 2.2 MG/L
ERYTHROCYTE [DISTWIDTH] IN BLOOD BY AUTOMATED COUNT: 13 % (ref 11.6–15.1)
ERYTHROCYTE [SEDIMENTATION RATE] IN BLOOD: 24 MM/HOUR (ref 0–29)
FERRITIN SERPL-MCNC: 61 NG/ML (ref 11–307)
HCT VFR BLD AUTO: 52.4 % (ref 34.8–46.1)
HDLC SERPL-MCNC: 42 MG/DL
HGB BLD-MCNC: 17.4 G/DL (ref 11.5–15.4)
IRON SATN MFR SERPL: 27 % (ref 15–50)
IRON SERPL-MCNC: 96 UG/DL (ref 50–212)
LDLC SERPL CALC-MCNC: 154 MG/DL (ref 0–100)
MCH RBC QN AUTO: 30.3 PG (ref 26.8–34.3)
MCHC RBC AUTO-ENTMCNC: 33.2 G/DL (ref 31.4–37.4)
MCV RBC AUTO: 91 FL (ref 82–98)
PLATELET # BLD AUTO: 181 THOUSANDS/UL (ref 149–390)
PMV BLD AUTO: 13 FL (ref 8.9–12.7)
PROT SERPL-MCNC: 7.2 G/DL (ref 6.4–8.4)
RBC # BLD AUTO: 5.75 MILLION/UL (ref 3.81–5.12)
TIBC SERPL-MCNC: 353 UG/DL (ref 250–450)
TRIGL SERPL-MCNC: 213 MG/DL
UIBC SERPL-MCNC: 257 UG/DL (ref 155–355)
WBC # BLD AUTO: 8.91 THOUSAND/UL (ref 4.31–10.16)

## 2024-08-15 PROCEDURE — 83550 IRON BINDING TEST: CPT

## 2024-08-15 PROCEDURE — 82085 ASSAY OF ALDOLASE: CPT

## 2024-08-15 PROCEDURE — 86039 ANTINUCLEAR ANTIBODIES (ANA): CPT

## 2024-08-15 PROCEDURE — 83540 ASSAY OF IRON: CPT

## 2024-08-15 PROCEDURE — 86140 C-REACTIVE PROTEIN: CPT

## 2024-08-15 PROCEDURE — 80076 HEPATIC FUNCTION PANEL: CPT

## 2024-08-15 PROCEDURE — 86618 LYME DISEASE ANTIBODY: CPT

## 2024-08-15 PROCEDURE — 86225 DNA ANTIBODY NATIVE: CPT

## 2024-08-15 PROCEDURE — 86235 NUCLEAR ANTIGEN ANTIBODY: CPT

## 2024-08-15 PROCEDURE — 36415 COLL VENOUS BLD VENIPUNCTURE: CPT

## 2024-08-15 PROCEDURE — 85027 COMPLETE CBC AUTOMATED: CPT

## 2024-08-15 PROCEDURE — 84165 PROTEIN E-PHORESIS SERUM: CPT

## 2024-08-15 PROCEDURE — 86200 CCP ANTIBODY: CPT

## 2024-08-15 PROCEDURE — 86430 RHEUMATOID FACTOR TEST QUAL: CPT

## 2024-08-15 PROCEDURE — 82728 ASSAY OF FERRITIN: CPT

## 2024-08-15 PROCEDURE — 80061 LIPID PANEL: CPT

## 2024-08-15 PROCEDURE — 85652 RBC SED RATE AUTOMATED: CPT

## 2024-08-15 PROCEDURE — 86038 ANTINUCLEAR ANTIBODIES: CPT

## 2024-08-16 LAB
ALDOLASE SERPL-CCNC: 4.4 U/L (ref 3.3–10.3)
ANA HOMOGEN SER QL IF: NORMAL
ANA HOMOGEN TITR SER: NORMAL {TITER}
ANA SER QL IA: POSITIVE
CCP AB SER IA-ACNC: <0.4
DSDNA AB SER-ACNC: <1 IU/ML (ref 0–9)
DSDNA AB SER-ACNC: <4 IU/ML
ENA RNP AB SER IA-ACNC: NEGATIVE
ENA SM AB SER IA-ACNC: NEGATIVE
ENA SM+RNP IGG SER-ACNC: NEGATIVE
ENA SS-A AB SER IA-ACNC: POSITIVE
ENA SS-A AB SER-ACNC: 1.1 AI (ref 0–0.9)
ENA SS-B AB SER IA-ACNC: NEGATIVE
ENA SS-B AB SER-ACNC: <0.2 AI (ref 0–0.9)
RHEUMATOID FACT SER QL LA: NEGATIVE

## 2024-08-17 LAB
ALBUMIN SERPL ELPH-MCNC: 4 G/DL (ref 3.2–5.1)
ALBUMIN SERPL ELPH-MCNC: 59.7 % (ref 48–70)
ALPHA1 GLOB SERPL ELPH-MCNC: 0.25 G/DL (ref 0.15–0.47)
ALPHA1 GLOB SERPL ELPH-MCNC: 3.8 % (ref 1.8–7)
ALPHA2 GLOB SERPL ELPH-MCNC: 0.74 G/DL (ref 0.42–1.04)
ALPHA2 GLOB SERPL ELPH-MCNC: 11 % (ref 5.9–14.9)
BETA GLOB ABNORMAL SERPL ELPH-MCNC: 0.46 G/DL (ref 0.31–0.57)
BETA1 GLOB SERPL ELPH-MCNC: 6.8 % (ref 4.7–7.7)
BETA2 GLOB SERPL ELPH-MCNC: 6.1 % (ref 3.1–7.9)
BETA2+GAMMA GLOB SERPL ELPH-MCNC: 0.41 G/DL (ref 0.2–0.58)
GAMMA GLOB ABNORMAL SERPL ELPH-MCNC: 0.84 G/DL (ref 0.4–1.66)
GAMMA GLOB SERPL ELPH-MCNC: 12.6 % (ref 6.9–22.3)
IGG/ALB SER: 1.48 {RATIO} (ref 1.1–1.8)
PROT PATTERN SERPL ELPH-IMP: NORMAL
PROT SERPL-MCNC: 6.7 G/DL (ref 6.4–8.2)

## 2024-08-17 PROCEDURE — 84165 PROTEIN E-PHORESIS SERUM: CPT | Performed by: STUDENT IN AN ORGANIZED HEALTH CARE EDUCATION/TRAINING PROGRAM

## 2024-08-19 ENCOUNTER — TELEPHONE (OUTPATIENT)
Dept: FAMILY MEDICINE CLINIC | Facility: CLINIC | Age: 64
End: 2024-08-19

## 2024-08-19 DIAGNOSIS — M35.03 SJOGREN'S SYNDROME WITH MYOPATHY (HCC): ICD-10-CM

## 2024-08-19 DIAGNOSIS — R76.8 ANA POSITIVE: ICD-10-CM

## 2024-08-19 DIAGNOSIS — M79.10 MYALGIA: Primary | ICD-10-CM

## 2024-08-19 NOTE — TELEPHONE ENCOUNTER
Please contact the patient. I have reviewed results of recent blood work that revealed positive testing for  inflammatory disease including Sjogren's and lupus.    I recommend to schedule evaluation by rheumatology to address symptoms of painful muscle spasms and results of blood work.    Referral placed.  Thank you

## 2024-09-08 NOTE — PROGRESS NOTES
Natalia Horn 64 y.o. female MRN: [id number]    Encounter: [id number]      Assessment & Plan     Assessment:  This is a 64 y.o.-year-old female with osteoporosis, loss of height and vitamin D deficiency.     Plan:  1. Age-related osteoporosis without current pathological fracture  Assessment & Plan:  Most recent DXA from June 2024 shows:  LUMBAR SPINE  Level: L1, L3, L4  (L2 vertebra excluded from analysis due to local structural abnormalities or artifact):  T-score: -3.2     LEFT  TOTAL HIP  T-score: -1.2     LEFT  FEMORAL NECK:  T score: -1.9  This is consistent with severe osteoporosis. Etiology is likely postmenopausal, however  patient will need additional workup to evaluate for secondary causes contributing to her low BMD. Will need repeat vitamin D levels for evaluation for primary hypercalcemia, as well as further investigation of elevated alkaline phosphatase noted on labs. Will also check urine calcium to evaluate patient for urinary calcium losses.  Her history of premature surgical menopause, smoking history, steroid use history are also likely contributing to her low bone mineral density.   Given loss of height, she should be screened for occult fractures, particularly in the setting of ongoing low back pain and possible L2 abnormality seen on DXA.     Plan:   Reviewed pathophysiology of osteoporosis, including risk for fracture and morbidty and mortality associated with fracture.  Reviewed potential need for treatment to reduce risk of fracture.  Reviewed limits of bone density test in predicting fracture risk  Emphasized importance of regular calcium and vitamin D intake; Goal vitamin D >30 and calcium intake ~1200mg /day.  Counseled patient on the effects of tobacco use on bone mineral density, advised patient to stop smoking.   Discussed fall risk and strategies for fall prevention with patient.   Will check CMP, Magnesium, Phosphorus, 25-OH-Vitamin D, PTH, 24 hour Urine calcium, Bone specific  "alkaline phosphatase to evaluate for secondary causes of osteoporosis.  Therapy recommendations pending additional workup.   Weight bearing exercise as tolerated  Will order throacolumbar Xray to evaluate for occult fractures.   Next scheduled follow up in 8 weeks  Orders:  -     Ambulatory referral to Endocrinology  -     Vitamin D 25 hydroxy; Future  -     Comprehensive metabolic panel; Future  -     Phosphorus; Future  -     Magnesium; Future  -     Calcium, 24 Hour Urine (w/ Creatinine); Future  -     Alkaline phosphatase, bone specific; Future  -     PTH, intact; Future  -     XR spine thoracolumbar 2 vw; Future; Expected date: 09/09/2024  2. Loss of height  Assessment & Plan:  Endorses 2\" loss of adult height.   Pt notes ongoing low back pain. which warrants further investigation with imaging to evaluate for occult fracture.  Will order thoracolumbar Xray for further evaluation  3. Vitamin D deficiency  Assessment & Plan:  As evidenced by 25-OH-Vit D of 27.0 noted on labs in May 2024.  Patient has been taking vitamin D3 2000 units/d after receiving abnormal DEXA scan results.  Increased dose to 4000 units daily 1 week ago.  Discussed with patient the sequela of having too low, as well as to high vitamin D levels.  Advised patient to reduce vitamin D3 dose to 2000 units daily at this time.  Will recheck 25 OH vitamin D with next set of labs.        History of Present Illness      HPI:  Natalia Horn is a 64 y.o. female with a PMH of osteoporosis, vitamin D deficiency, Sjogren syndrome, COPD, CAD who is here for new patient visit for evaluation of osteoporosis.  She was diagnosed with osteoporosis after a DXA scan in June 2024 demonstrated osteoporosis at the lumbar spine and osteopenia and the left total hip and left femoral neck.   Patient denies history of falls of previous fragility fractures. She endorses a 2\" loss of adult height, stating that she was 5'3\" in the past, but is now only 5'1\". She denies " "history of malabsorption, kidney  stones, rheumatoid arthritis, hypothyroidism, but states that she has appointment coming up with rheumatology for evaluation of spasms of her chest wall and lower extremities.  She is not sure as to whether she has a family history of osteoporosis.  Given history of COPD, she has been taking steroid containing inhalers, was previously on Advair, and is currently on Wixela.  She also notes that she got 2 steroid injections in her lower back in 2004 due to back pain, as well as a few days of prednisone for COPD exacerbation in the past.  Denies PPI or H2 blocker use, immunosuppressants, antiestrogens, antiandrogens, lithium or anticonvulsant use.  Denies lactose intolerance.  States that she does not drink milk, but eats cheese and dark leafy greens.  Started taking vitamin D3 2000 units after getting her DEXA scan results and states that she increased the dose to 4000 units daily approximately 1 week ago.  She does not take any other supplements.  Patient is a current everyday smoker with approximately 40-pack-year smoking history.  Rarely drinks alcohol, approximately 1 alcoholic drink per month.  States that she had a hysterectomy due to endometriosis in 1981 with concurrent oophorectomy.  Reportedly had a \"bone scan\" around that time.  Also states that she was on hormonal treatment which was discontinued after she presented to the hospital with neck pain and was found to have Prinzmetal angina.  At baseline patient is ambulatory and does not require the use of ambulation assisting devices.     All other systems were reviewed and are negative.     Review of Systems   Constitutional:  Negative for activity change, appetite change, fatigue, fever and unexpected weight change.   HENT:  Negative for congestion and trouble swallowing.    Eyes:  Negative for photophobia and visual disturbance.   Respiratory:  Negative for cough and shortness of breath.    Cardiovascular:  Negative for " chest pain, palpitations and leg swelling.   Gastrointestinal:  Negative for abdominal pain, constipation, diarrhea, nausea and vomiting.   Endocrine: Negative for polydipsia and polyuria.   Genitourinary:  Negative for difficulty urinating, dysuria, frequency and hematuria.   Musculoskeletal:  Positive for back pain. Negative for arthralgias and myalgias.        Muscle spasms lower extremities and chest wall.    Skin:  Negative for color change, pallor and rash.   Neurological:  Negative for dizziness, facial asymmetry, weakness, light-headedness, numbness and headaches.   Psychiatric/Behavioral:  Negative for dysphoric mood.    All other systems reviewed and are negative.      Historical Information   Past Medical History:   Diagnosis Date    COPD (chronic obstructive pulmonary disease) (HCC)     GERD (gastroesophageal reflux disease)     Hypercholesterolemia     Iliotibial band tendonitis     Last Assessed: 2015    Major depressive disorder, recurrent ([facility]) 2013    Per CMS ICD 10 Guidelines--Per Physician      Migraine     Postprocedural pneumothorax 2021    Prinzmetal angina ([facility])     Last Assessed: 2017    Sciatica     Last Assessed: 11/10/2014    Thrush of mouth and esophagus  ([facility]) 2021    Tobacco abuse 2018    Vertigo     Last Assessed: 2017     Past Surgical History:   Procedure Laterality Date    COLONOSCOPY  2011    Complete: Dr. Jurado - due in 5 years, Colonoscopy 2017, Diverticulosis, 5 mm polyp, Due in 5 years    ESOPHAGOGASTRODUODENOSCOPY      Diagnostic; Last Assessed: 2014    FLEXOR TENDON REPAIR      left finger    HYSTERECTOMY      @ agr 28    IR CHEST TUBE PLACEMENT  2021    RI LARYNGOSCOPY W/WO TRACHEOSCOPY DX EXCEPT  N/A 2019    Procedure: LARYNGOSCOPY DIRECT, FLEXIBLE BRONCHOSCOPY, FLEXIBLE ESOPHAGOSCOPY;  Surgeon: Jerry Hopper MD;  Location: [facility];  Service: ENT    SHOULDER ARTHROSCOPY Right     SHOULDER SURGERY       TOTAL ABDOMINAL HYSTERECTOMY W/ BILATERAL SALPINGOOPHORECTOMY      endometriosis; Last Assessed: 5/4/2015     Social History   Social History     Substance and Sexual Activity   Alcohol Use No     Social History     Substance and Sexual Activity   Drug Use No     Social History     Tobacco Use   Smoking Status Every Day    Current packs/day: 0.00    Average packs/day: 0.5 packs/day for 51.5 years (25.7 ttl pk-yrs)    Types: Cigarettes    Start date: 1970    Last attempt to quit: 7/1/2021    Years since quitting: 3.1   Smokeless Tobacco Never     Family History:   Family History   Problem Relation Age of Onset    Lung cancer Mother 54    Other Father         Dyslipidemia    Diabetes Sister     No Known Problems Sister     No Known Problems Sister     No Known Problems Daughter     No Known Problems Daughter     Ovarian cancer Maternal Grandmother 53    No Known Problems Maternal Grandfather     Lung cancer Paternal Grandmother     No Known Problems Paternal Grandfather     No Known Problems Maternal Aunt     No Known Problems Paternal Aunt     No Known Problems Paternal Aunt     No Known Problems Paternal Aunt     Hypertension Family     Breast cancer Other         doesn't know age       Meds/Allergies   Current Outpatient Medications   Medication Sig Dispense Refill    albuterol (2.5 mg/3 mL) 0.083 % nebulizer solution NEBULIZE 1 VIAL EVERY 4 HOURS AS NEEDED FOR  WHEEZING OR SHORTNESS OF BREATH 360 mL 5    Albuterol Sulfate (ProAir RespiClick) 108 (90 Base) MCG/ACT AEPB Inhale 2 puffs 4 (four) times a day as needed (wheezing and cough) 1 each 3    aspirin (ECOTRIN LOW STRENGTH) 81 mg EC tablet Take 1 tablet by mouth daily      cyclobenzaprine (FLEXERIL) 5 mg tablet TAKE ONE TABLET BY MOUTH EVERY DAY AS NEEDED FOR MUSCLE SPASMS (BACK PAIN) 90 tablet 3    fluticasone (FLONASE) 50 mcg/act nasal spray 2 sprays into each nostril daily 16 g 5    Fluticasone-Salmeterol (Advair) 250-50 mcg/dose inhaler Inhale 1 puff 2  (two) times a day Rinse mouth after use. 60 blister 5    guaifenesin-codeine (GUAIFENESIN AC) 100-10 MG/5ML liquid Take 10 mL every 4-6 hours as needed for cough 180 mL 0    irbesartan (AVAPRO) 75 mg tablet Take 1 tablet (75 mg total) by mouth daily 30 tablet 5    lovastatin (MEVACOR) 10 MG tablet Take 1 tablet (10 mg total) by mouth daily at bedtime 30 tablet 3    nicotine (NICODERM CQ) 14 mg/24hr TD 24 hr patch Place 1 patch on the skin over 24 hours every 24 hours 28 patch 0    nitroglycerin (NITROSTAT) 0.4 mg SL tablet Place 1 tablet (0.4 mg total) under the tongue every 5 (five) minutes as needed for chest pain 25 tablet 1     No current facility-administered medications for this visit.     Allergies   Allergen Reactions    Darvon [Propoxyphene] Itching    Crestor [Rosuvastatin] Myalgia       Objective   Vitals: not currently breastfeeding.    Physical Exam  Vitals and nursing note reviewed.   Constitutional:       General: She is not in acute distress.     Appearance: Normal appearance. She is not ill-appearing, toxic-appearing or diaphoretic.   HENT:      Head: Normocephalic and atraumatic.      Nose: Nose normal.      Mouth/Throat:      Mouth: Mucous membranes are moist.      Pharynx: Oropharynx is clear.   Eyes:      General: No scleral icterus.        Right eye: No discharge.         Left eye: No discharge.      Extraocular Movements: Extraocular movements intact.      Conjunctiva/sclera: Conjunctivae normal.   Cardiovascular:      Rate and Rhythm: Normal rate and regular rhythm.      Pulses: Normal pulses.      Heart sounds: Normal heart sounds. No murmur heard.  Pulmonary:      Effort: Pulmonary effort is normal. No respiratory distress.      Breath sounds: Normal breath sounds. No wheezing, rhonchi or rales.   Abdominal:      General: Abdomen is flat. Bowel sounds are normal. There is no distension.      Palpations: Abdomen is soft.      Tenderness: There is no abdominal tenderness. There is no guarding  or rebound.   Musculoskeletal:         General: Tenderness (mild tenderness to palpation at the lumbar spine) present.      Cervical back: Normal range of motion and neck supple.      Right lower leg: No edema.      Left lower leg: No edema.   Skin:     General: Skin is warm and dry.      Coloration: Skin is not jaundiced or pale.   Neurological:      Mental Status: She is alert and oriented to person, place, and time. Mental status is at baseline.   Psychiatric:         Mood and Affect: Mood normal.         Behavior: Behavior normal.         Thought Content: Thought content normal.         Judgment: Judgment normal.          The history was obtained from the review of the chart, patient.    Lab Results:   Lab Results   Component Value Date/Time    Potassium 4.3 05/30/2024 10:49 AM    Chloride 102 05/30/2024 10:49 AM    CO2 28 05/30/2024 10:49 AM    BUN 8 05/30/2024 10:49 AM    Creatinine 0.63 05/30/2024 10:49 AM    Glucose, Fasting 101 (H) 05/30/2024 10:49 AM    Calcium 9.4 05/30/2024 10:49 AM    eGFR 94 05/30/2024 10:49 AM    TSH 3RD GENERATON 2.124 05/30/2024 10:49 AM    Vit D, 25-Hydroxy 27.0 (L) 05/30/2024 10:49 AM        Imaging Studies:         Results for orders placed during the hospital encounter of 06/26/24    DXA bone density spine hip and pelvis    Impression  1. Osteoporosis. Based on the lumbar spine.    2.  The 10 year risk of hip fracture is 2.2% with the 10 year risk of major osteoporotic fracture being 10% as calculated by the [employer] fracture risk assessment tool (FRAX, which is based on data generated by the WHO Collaborating [place]  for Metabolic Bone Diseases).    3.  The current NOF guidelines recommend treating patients with a T-score of -2.5 or less in the lumbar spine or hips, or in post-menopausal women and men over the age of 50 with low bone mass (osteopenia) and a FRAX 10 year risk score of >3% for hip  fracture and/or >20% for major osteoporotic fracture.    4.  The  "NOF recommends follow-up DXA in 1-2 years after initiating therapy for osteoporosis and every 2 years thereafter. More frequent evaluation is appropriate for patients with conditions associated with rapid bone loss, such as glucocorticoid  therapy. The interval between DXA screenings may be longer for individuals without major risk factors and initial T-score in the normal or upper low bone mass range.      The FRAX algorithm has certain limitations:  -FRAX has not been validated in patients currently or previously treated with pharmacotherapy for osteoporosis.  In such patients, clinical judgment must be exercised in interpreting FRAX scores.  -Prior hip, vertebral and humeral fragility fractures appear to confer greater risk of subsequent fracture than fractures at other sites (this is especially true for individuals with severe vertebral fractures), but quantification of this incremental  risk is not possible with FRAX.  -FRAX underestimates fracture risk in patients with history of multiple fragility fractures.  -FRAX may underestimate fracture risk in patients with history of frequent falls.  -It is not appropriate to use FRAX to monitor treatment response.      WHO CLASSIFICATION:  Normal (a T-score of -1.0 or higher)  Low bone mineral density (a T-score of less than -1.0 but higher than -2.5)  Osteoporosis (a T-score of -2.5 or less)  Severe osteoporosis (a T-score of -2.5 or less with a fragility fracture)              Workstation performed: [id number]             I have personally reviewed pertinent reports.      Portions of the record may have been created with voice recognition software. Occasional wrong word or \"sound a like\" substitutions may have occurred due to the inherent limitations of voice recognition software. Read the chart carefully and recognize, using context, where substitutions have occurred.       "

## 2024-09-09 ENCOUNTER — CONSULT (OUTPATIENT)
Dept: ENDOCRINOLOGY | Facility: CLINIC | Age: 64
End: 2024-09-09
Payer: MEDICARE

## 2024-09-09 VITALS
HEIGHT: 62 IN | WEIGHT: 146 LBS | SYSTOLIC BLOOD PRESSURE: 120 MMHG | OXYGEN SATURATION: 98 % | BODY MASS INDEX: 26.87 KG/M2 | DIASTOLIC BLOOD PRESSURE: 80 MMHG | HEART RATE: 68 BPM

## 2024-09-09 DIAGNOSIS — E55.9 VITAMIN D DEFICIENCY: ICD-10-CM

## 2024-09-09 DIAGNOSIS — R29.890 LOSS OF HEIGHT: ICD-10-CM

## 2024-09-09 DIAGNOSIS — M81.0 AGE-RELATED OSTEOPOROSIS WITHOUT CURRENT PATHOLOGICAL FRACTURE: Primary | ICD-10-CM

## 2024-09-09 PROCEDURE — 99204 OFFICE O/P NEW MOD 45 MIN: CPT | Performed by: INTERNAL MEDICINE

## 2024-09-09 RX ORDER — MULTIVITAMIN WITH IRON
TABLET ORAL
COMMUNITY

## 2024-09-09 RX ORDER — ZINC GLUCONATE 50 MG
50 TABLET ORAL DAILY
COMMUNITY

## 2024-09-09 RX ORDER — CHOLECALCIFEROL (VITAMIN D3) 10 MCG (400 UNIT) TABLET: COMMUNITY

## 2024-09-09 NOTE — ASSESSMENT & PLAN NOTE
As evidenced by 25-OH-Vit D of 27.0 noted on labs in May 2024.  Patient has been taking vitamin D3 2000 units/d after receiving abnormal DEXA scan results.  Increased dose to 4000 units daily 1 week ago.  Discussed with patient the sequela of having too low, as well as to high vitamin D levels.  Advised patient to reduce vitamin D3 dose to 2000 units daily at this time.  Will recheck 25 OH vitamin D with next set of labs.

## 2024-09-09 NOTE — ASSESSMENT & PLAN NOTE
"Endorses 2\" loss of adult height.   Pt notes ongoing low back pain. which warrants further investigation with imaging to evaluate for occult fracture.  Will order thoracolumbar Xray for further evaluation  "

## 2024-09-09 NOTE — ASSESSMENT & PLAN NOTE
Most recent DXA from June 2024 shows:  LUMBAR SPINE  Level: L1, L3, L4  (L2 vertebra excluded from analysis due to local structural abnormalities or artifact):  T-score: -3.2     LEFT  TOTAL HIP  T-score: -1.2     LEFT  FEMORAL NECK:  T score: -1.9  This is consistent with severe osteoporosis. Etiology is likely postmenopausal, however  patient will need additional workup to evaluate for secondary causes contributing to her low BMD. Will need repeat vitamin D levels for evaluation for primary hypercalcemia, as well as further investigation of elevated alkaline phosphatase noted on labs. Will also check urine calcium to evaluate patient for urinary calcium losses.  Her history of premature surgical menopause, smoking history, steroid use history are also likely contributing to her low bone mineral density.   Given loss of height, she should be screened for occult fractures, particularly in the setting of ongoing low back pain and possible L2 abnormality seen on DXA.     Plan:   Reviewed pathophysiology of osteoporosis, including risk for fracture and morbidty and mortality associated with fracture.  Reviewed potential need for treatment to reduce risk of fracture.  Reviewed limits of bone density test in predicting fracture risk  Emphasized importance of regular calcium and vitamin D intake; Goal vitamin D >30 and calcium intake ~1200mg /day.  Counseled patient on the effects of tobacco use on bone mineral density, advised patient to stop smoking.   Discussed fall risk and strategies for fall prevention with patient.   Will check CMP, Magnesium, Phosphorus, 25-OH-Vitamin D, PTH, 24 hour Urine calcium, Bone specific alkaline phosphatase to evaluate for secondary causes of osteoporosis.  Therapy recommendations pending additional workup.   Weight bearing exercise as tolerated  Will order throacolumbar Xray to evaluate for occult fractures.   Next scheduled follow up in 8 weeks

## 2024-09-09 NOTE — PATIENT INSTRUCTIONS
Please obtain fasting blood work in approximately 1 month after you have been getting adequate amounts of calcium in your diet.   Please obtain an Xray of your spine  Ensure you are getting 1200mg of calcium. You can get calcium through your food, and may also supplement it with over the counter calcium supplements.   At this time we recommend taking vitamin D3 2000 units daily.   Return for follow up in 8 weeks.       A Guide to Calcium-Rich Foods  We all know that milk is a great source of calcium, but you may be surprised by all the different foods you can work into your diet to reach your daily recommended amount of calcium. Use the guide below to get ideas of additional calcium-rich foods to add to your weekly shopping list.  Produce Serving Size Estimated Calcium*   [place] greens, frozen 8 oz 360 mg   Broccoli alfred 8 oz 200 mg   Kale, frozen 8 oz 180 mg   Soy Beans, green, boiled 8 oz 175 mg   Bok Javier, cooked, boiled 8 oz 160 mg   Figs, dried 2 figs 65 mg   Broccoli, fresh, cooked 8 oz 60 mg   Oranges 1 whole 55 mg   Seafood Serving Size Estimated Calcium*   Sardines, canned with bones 3 oz 325 mg   [place], canned with bones 3 oz 180 mg   Shrimp, canned 3 oz 125 mg   Dairy Serving Size Estimated Calcium*   Ricotta, part-skim 4 oz 335 mg   Yogurt, plain, low-fat 6 oz 310 mg   Milk, skim, low-fat, whole 8 oz 300 mg   Yogurt with fruit, low-fat 6 oz 260 mg   Mozzarella, part-skim 1 oz 210 mg   Cheddar 1 oz 205 mg   Yogurt, Greek 6 oz 200 mg   American Cheese 1 oz 195 mg   Feta Cheese 4 oz 140 mg   Cottage Cheese, 2% 4 oz 105 mg   Frozen yogurt, vanilla 8 oz 105 mg   Ice Cream, vanilla 8 oz 85 mg   Parmesan 1 tbsp 55 mg   Fortified Food Serving Size Estimated Calcium*   [place] milk, rice milk or soy milk, fortified 8 oz 300 mg   Orange juice and other fruit juices, fortified 8 oz 300 mg   Tofu, prepared with calcium 4 oz 205 mg   Waffle, frozen, fortified 2 pieces 200 mg   Oatmeal, fortified 1 packet 140 mg    English muffin, fortified 1 muffin 100 mg   Cereal, fortified 8 oz 100-1,000 mg   Other Serving Size Estimated Calcium*   Mac & cheese, frozen 1 package 325 mg   Pizza, cheese, frozen 1 serving 115 mg   Pudding, chocolate, prepared with 2% milk 4 oz 160 mg   Beans, baked, canned 4 oz 160 mg   *The calcium content listed for most foods is estimated and can vary due to multiple factors. Check the food label to determine how much calcium is in a particular product.

## 2024-09-11 NOTE — PROGRESS NOTES
"Rheumatology Initial Outpatient Visit  Name: Natalia Horn      : 1960      MRN: [id number]  Encounter Provider: Ericka Fischer MD  Encounter Date: 2024   Encounter department: [facility] [address] [place] [address]    Assessment & Plan  GABRIELLE positive  Patient presents for evaluation of GABRIELLE 1:80 and low titer SSA 1.1 in the setting of muscle cramps. She does not have any features of a CTD, such rashes, arthritis, sicca symptoms etc. Muscle cramping in and of itself is not a feature of CTD. Reassurance provided that at this time I do not feel patient has a Rheumatologic condition. Education provided on signs and symptoms that she should watch for that would prompt re-evaluation in Rheumatology. Follow-up PRN.     Orders:    Ambulatory Referral to Rheumatology    Myalgia    Orders:    Ambulatory Referral to Rheumatology    Ericka Fischer MD, [facility], [facility] - [facility]    History of Present Illness     Natalia Horn is a 64 y.o. female who presents for evaluation of abnormal labs. PMHx significant for COPD, CAD, DLD, vitamin D deficiency, osteoporosis, pulmonary nodule.     Patient reports for the past 2 years she has been experiencing muscle spasms and they are starting to get more severe in nature and more frequent. She reports that she gets cramps in mid back that radiate to her stomach. Anything can set them off, such as turning over in bed. She also reports pain and cramps that are in her lower legs from feet up to knee. She cannot think of any triggers that seem to come along. She also experiences cramps in her hands and feet. Feet cramps occur at night in bed when toes curl under. Hands cramp and \"freeze\" when she uses them. Needs to step and rub them to improve the symptoms. She takes Advil which helps and also finds heat helpful. She has tried coming off her statin medications and noticed less cramps, but it was still present.     Review of " "Systems  Complete ROS conducted as per HPI. In addition, denies:  Sicca symptoms  Rash  Oral/nasal/genital ulcers  Muscle weakness  Uveitis  Dactylitis  Dysphagia/odynophagia  Pleurisy  Gross hematuria  Foamy urine  Raynaud's  Joint swelling      Objective     /74   Pulse 73   Temp 98.6 °F (37 °C) (Tympanic)   Ht 5' 1.75\" (1.568 m)   Wt 66.7 kg (147 lb)   SpO2 94%   BMI 27.10 kg/m²     Physical Exam  Physical Exam  Constitutional: well appearing, no acute distress  HEENT: normocephalic, sclera clear, no visible oral or nasal ulcers  Neck: supple, no palpable cervical adenopathy  CV: regular rate and rhythm, no murmur  Pulm: normal respiratory effort, lungs clear to auscultation b/l  Skin: no rashes, no skin thickening  Extremities: warm and well perfused, no edema  MSK: no significant effusions or synovitis    Labs and Imaging  I have personally reviewed pertinent labs and imaging.     GABRIELLE 1:80  SSA 1.1, SSB < 0.2  dsDNA negative  RF, CCP negative  ESR CRP nml  25 OH 27  CK nml    "

## 2024-09-16 ENCOUNTER — HOSPITAL ENCOUNTER (OUTPATIENT)
Dept: RADIOLOGY | Facility: HOSPITAL | Age: 64
Discharge: HOME | End: 2024-09-16
Payer: MEDICARE

## 2024-09-16 DIAGNOSIS — M81.0 AGE-RELATED OSTEOPOROSIS WITHOUT CURRENT PATHOLOGICAL FRACTURE: ICD-10-CM

## 2024-09-16 PROCEDURE — 72080 X-RAY EXAM THORACOLMB 2/> VW: CPT

## 2024-09-17 ENCOUNTER — CONSULT (OUTPATIENT)
Age: 64
End: 2024-09-17
Payer: MEDICARE

## 2024-09-17 VITALS
DIASTOLIC BLOOD PRESSURE: 74 MMHG | HEART RATE: 73 BPM | OXYGEN SATURATION: 94 % | HEIGHT: 62 IN | BODY MASS INDEX: 27.05 KG/M2 | TEMPERATURE: 98.6 F | WEIGHT: 147 LBS | SYSTOLIC BLOOD PRESSURE: 118 MMHG

## 2024-09-17 DIAGNOSIS — R76.8 ANA POSITIVE: Primary | ICD-10-CM

## 2024-09-17 DIAGNOSIS — M79.10 MYALGIA: ICD-10-CM

## 2024-09-17 PROCEDURE — 99203 OFFICE O/P NEW LOW 30 MIN: CPT | Performed by: STUDENT IN AN ORGANIZED HEALTH CARE EDUCATION/TRAINING PROGRAM

## 2024-09-17 RX ORDER — EZETIMIBE 10 MG/1
10 TABLET ORAL DAILY
COMMUNITY

## 2024-09-17 NOTE — ASSESSMENT & PLAN NOTE
Patient presents for evaluation of GABRIELLE 1:80 and low titer SSA 1.1 in the setting of muscle cramps. She does not have any features of a CTD, such rashes, arthritis, sicca symptoms etc. Muscle cramping in and of itself is not a feature of CTD. Reassurance provided that at this time I do not feel patient has a Rheumatologic condition. Education provided on signs and symptoms that she should watch for that would prompt re-evaluation in Rheumatology. Follow-up PRN.     Orders:    Ambulatory Referral to Rheumatology     24

## 2024-09-17 NOTE — PATIENT INSTRUCTIONS
What is an GABRIELLE?     GABRIELLE stands for anti-nuclear antibody. An antibody is a molecule that is produced by the immune system and has the ability to incite inflammation in autoimmune disease. It has an association with conditions such as systemic lupus, Sjogren's, systemic sclerosis, inflammatory myositis, and mixed connective tissue disease.     The preferred method to measure in the antinuclear antibody is with indirect immunofluorescence. A patient's blood is added to pre-existing cells on a microscope slide. If the antibodies are present, they bind to the nuclei of the pre-existing cells on the microscope slide. Then, a fluorescent marker is added which binds to the antibody to allow the  to see the presence of the antibody under the microscope. If there is no fluorescence, the test is considered negative. If fluorescence is detected, the next step is to determine how much antibody is present. To do this, the technician performs a serial dilution on the patient's blood and repeats the test until there is no longer fluorescence. So, the more times the blood can be diluted and still see the fluorescence under the microscope, the higher the amount of antibody is in the blood. This is reported as a titer level, such as 1:1280. The higher the titer level, the more antibody is present.    The other method used to detect the antinuclear antibody is the enzyme linked immunoassay (MARVIN). This test uses an array of beads with markers that the antibodies can bind to. Unfortunately, this type of testing has the potential for false positive results because the method in which the test is performed can denature the testing material. Therefore, it is important to always perform an immunofluorescence test if the MARVIN test detects an antinuclear antibody.    At what point is an GABRIELLE test considered to be positive?    According to the American College of Rheumat[employer], a positive GABRIELLE is considered to be a titer level of  "1:80 or higher. Certain labs may report a titer of 1:40 as a \"positive\" test result by default. Up to 30% of healthy individuals can have a positive GABRIELLE of 1:40, 15% of 1:80, 5% of 1:160, and 3% of 1:320. To put this into numbers, if 5% of the [place] population is GABRIELLE positive, then 16 million individuals will have a positive GABRIELLE. In contrast, the prevalence of lupus is only 1/1000 or 320,000 individuals with systemic lupus in the [place]. Thus, the number of patients with a positive GABRIELLE without systemic lupus would far outnumber the number of patients with a positive GABRIELLE and systemic lupus, 50:1.    In addition, healthy relatives of a lupus patient may also have a positive GABRIELLE with low titer. As we age, such as above the age of 70 years old, we can also develop a low positive GABRIELLE titer. Another important consideration is whether a patient has another type of autoimmune disease. Many patients with other autoimmune diseases, such as autoimmune thyroid disease, celiac disease, type 1 diabetes, multiple sclerosis, inflammatory bowel disease, etc, also have a positive GABRIELLE. Lastly, medications can cause a patient to develop an GABRIELLE. A few examples include medications such as procainamide, hydralazine, minocycline, diltiazem, and TNF inhibitors.    Do we recommend repeat or trend an GABRIELLE level?    While there is no universal recommendation regarding this, we do know based on research that the level of GABRIELLE detected does not correlate with the disease activity. So, if a patient is found to have a negative or low titer GABRIELLE and the initial symptoms remain the same, there is no need to repeat an GABRIELLE. If a patient develops a new symptom concerning for a rheumatologic condition, such as new onset Raynaud's, there may be a role in repeating the GABRIELLE test. Similarly, once a patient has positive GABRIELLE test, there is no need to repeat the test again unless circumstances change.     What is my risk of developing systemic lupus in the future? " "    In patients with a positive GABRIELLE, approximately 5% of those patients will go on to develop systemic lupus in the future. Alternatively, a patient with a negative GABRIELLE is unlikely to develop systemic lupus in the future.       Is it possible to have lupus with a negative GABRIELLE?    As part of the criteria to diagnose systemic lupus, a patient must have an GABRIELLE present at a titer of at least 1:80. Previously, old testing mechanisms used to detect an GABRIELLE were not as sensitive as our current testing mechanisms. Historically patients who had symptoms of systemic lupus but did not have a detectable GABRIELLE were considered to be \"GABRIELLE negative\" lupus patients. However, these patients DID have detectable antibodies against other antigens, such as an SSA antibody. With the current methods of GABRIELLE testing with immunofluorescence, it is unlikely that the test would produce a \"false negative,\" meaning that if the test is negative then there is no GABRIELLE present to detect.     It is possible to have SKIN-limited lupus without having a positive GABRIELLE. Examples of this include discoid lupus or subacute cutaneous lupus erythematosus among others.      "

## 2024-10-03 ENCOUNTER — PATIENT MESSAGE (OUTPATIENT)
Dept: FAMILY MEDICINE CLINIC | Facility: CLINIC | Age: 64
End: 2024-10-03

## 2024-10-03 ENCOUNTER — OFFICE VISIT (OUTPATIENT)
Dept: FAMILY MEDICINE CLINIC | Facility: CLINIC | Age: 64
End: 2024-10-03
Payer: MEDICARE

## 2024-10-03 ENCOUNTER — TELEPHONE (OUTPATIENT)
Age: 64
End: 2024-10-03

## 2024-10-03 VITALS
SYSTOLIC BLOOD PRESSURE: 130 MMHG | OXYGEN SATURATION: 96 % | WEIGHT: 147 LBS | HEIGHT: 62 IN | TEMPERATURE: 97.8 F | DIASTOLIC BLOOD PRESSURE: 82 MMHG | BODY MASS INDEX: 27.05 KG/M2 | HEART RATE: 72 BPM | RESPIRATION RATE: 16 BRPM

## 2024-10-03 DIAGNOSIS — M51.369 DDD (DEGENERATIVE DISC DISEASE), LUMBAR: ICD-10-CM

## 2024-10-03 DIAGNOSIS — J44.9 MODERATE COPD (CHRONIC OBSTRUCTIVE PULMONARY DISEASE) (HCC): ICD-10-CM

## 2024-10-03 DIAGNOSIS — R91.1 SOLITARY PULMONARY NODULE: ICD-10-CM

## 2024-10-03 DIAGNOSIS — M81.0 AGE-RELATED OSTEOPOROSIS WITHOUT CURRENT PATHOLOGICAL FRACTURE: ICD-10-CM

## 2024-10-03 DIAGNOSIS — E78.2 MIXED HYPERLIPIDEMIA: Chronic | ICD-10-CM

## 2024-10-03 DIAGNOSIS — G62.9 NEUROPATHY: ICD-10-CM

## 2024-10-03 DIAGNOSIS — J32.9 SINUSITIS, UNSPECIFIED CHRONICITY, UNSPECIFIED LOCATION: ICD-10-CM

## 2024-10-03 DIAGNOSIS — M79.10 MYALGIA: Primary | ICD-10-CM

## 2024-10-03 PROBLEM — M35.03 SJOGREN'S SYNDROME WITH MYOPATHY (HCC): Status: RESOLVED | Noted: 2024-08-19 | Resolved: 2024-10-03

## 2024-10-03 PROCEDURE — G2211 COMPLEX E/M VISIT ADD ON: HCPCS | Performed by: FAMILY MEDICINE

## 2024-10-03 PROCEDURE — 99214 OFFICE O/P EST MOD 30 MIN: CPT | Performed by: FAMILY MEDICINE

## 2024-10-03 RX ORDER — AZITHROMYCIN 250 MG/1
TABLET, FILM COATED ORAL
Qty: 6 TABLET | Refills: 0 | Status: SHIPPED | OUTPATIENT
Start: 2024-10-03 | End: 2024-10-08

## 2024-10-03 RX ORDER — CYCLOBENZAPRINE HCL 5 MG
TABLET ORAL
Qty: 120 TABLET | Refills: 5 | Status: SHIPPED | OUTPATIENT
Start: 2024-10-03 | End: 2024-10-06 | Stop reason: SDUPTHER

## 2024-10-03 RX ORDER — CYCLOBENZAPRINE HCL 5 MG
TABLET ORAL
Qty: 120 TABLET | Refills: 5 | Status: SHIPPED | OUTPATIENT
Start: 2024-10-03 | End: 2024-10-03 | Stop reason: SDUPTHER

## 2024-10-03 NOTE — TELEPHONE ENCOUNTER
Pharmacy calling to request the Lovastatin be sent in as regular instead of what was sent over a few minutes ago as the 24 hour release.   The 24 release requires prior auth where the regular does not.     Can you please resend to [facility]?    Thank you!

## 2024-10-03 NOTE — ASSESSMENT & PLAN NOTE
Patient reports recurrent symptoms of hand and feet cramping and calf pain.  Daytime and nighttime symptoms.  Patient also reports cramping of her torso with range of motion.  Negative CK and aldolase  Statin free trial: No change in symptoms  Recent rheumatology evaluation due to weakly positive GABRIELLE and rheumatoid factor: No evidence of CTD  Proceed with EMG of arm and leg to rule out neuropathy.  Orders:    EMG 1 Upper/1 Lower Neuropathy; Future

## 2024-10-03 NOTE — ASSESSMENT & PLAN NOTE
Felt better after discontinuation of rosuvastatin.  Cholesterol was elevated back in June and she started lovastatin 10 mg daily.  Tolerates current Rx well.  Cholesterol still elevated.  We will increase dose from 10 to 20 mg daily    Orders:    lovastatin (ALTOPREV) 20 MG 24 hr tablet; Take 1 tablet (20 mg total) by mouth daily at bedtime

## 2024-10-03 NOTE — TELEPHONE ENCOUNTER
[facility] called regarding patient's new prescriptions:  cyclobenzaprine (FLEXERIL) 5 mg tablet her insurance will only cover max of 3 tablets daily.    lovastatin (ALTOPREV) 20 MG 24 hr tablet her insurance will cover generic statins if PCP would like to order alternative medication or a PA will need to be done since Altoprev has no generic.

## 2024-10-03 NOTE — PROGRESS NOTES
Ambulatory Visit  Name: Natalia Horn      : 1960      MRN: [id number]  Encounter Provider: Mercedes Friedman MD  Encounter Date: 10/3/2024   Encounter department: [facility]    Assessment & Plan  Myalgia  Patient reports recurrent symptoms of hand and feet cramping and calf pain.  Daytime and nighttime symptoms.  Patient also reports cramping of her torso with range of motion.  Negative CK and aldolase  Statin free trial: No change in symptoms  Recent rheumatology evaluation due to weakly positive GABRIELLE and rheumatoid factor: No evidence of CTD  Proceed with EMG of arm and leg to rule out neuropathy.  Orders:    EMG 1 Upper/1 Lower Neuropathy; Future    Neuropathy     Orders:    EMG 1 Upper/1 Lower Neuropathy; Future    Mixed hyperlipidemia  Felt better after discontinuation of rosuvastatin.  Cholesterol was elevated back in  and she started lovastatin 10 mg daily.  Tolerates current Rx well.  Cholesterol still elevated.  We will increase dose from 10 to 20 mg daily    Orders:    lovastatin (ALTOPREV) 20 MG 24 hr tablet; Take 1 tablet (20 mg total) by mouth daily at bedtime    DDD (degenerative disc disease), lumbar    Orders:    cyclobenzaprine (FLEXERIL) 5 mg tablet; Take 1 tablet twice a day and 2 tablets at bedtime as needed for painful muscle spasms    Sinusitis, unspecified chronicity, unspecified location    Orders:    azithromycin (Zithromax) 250 mg tablet; Take 2 tablets (500 mg total) by mouth daily for 1 day, THEN 1 tablet (250 mg total) daily for 4 days.    Age-related osteoporosis without current pathological fracture  DEXA 2024: Osteoporosis of lumbar spine  Patient was evaluated by [facility] endocrinology and will be proceeding with further testing and treatment.         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 However, given the macroscopic fat, this is again consistent with a hamartoma. The remaining nodules are unchanged. However, given the semisolid left upper lobe nodule, continued annual surveillance is recommended.    Pending follow-up with [facility] pulmonology in November            Return in about 19 weeks (around 2/13/2025) for follow up.    History of Present Illness     Follow-up chronic medical conditions  Recent evaluation with rheumatology.No new recommendations  Ongoing symptoms of hand and feet cramping that occurs daytime and nighttime.Patient reports that occasionally her hand cramps while she is holding the fork.   Feet and calves are predominantly cramping at night.Chronic spasm across torso and back with simple turning or movement of head and neck. No muscle spasms along the spine. We have previously discontinued statin therapy-patient did not feel any significant improvement.     DEXA scan 6/2024: Osteoporosis of lumbar spine with T-score of -3.2, osteopenia of hip and femoral neck     Results of recent blood work reviewed.  Cholesterol improved slightly on low-dose of lovastatin 10 mg daily.  Negative CPK and aldolase  Weakly positive GABRIELLE and rheumatoid factor, negative CCP    Pending follow-up with pulmonology, 11/6/24.  She is doing well on Wixela, inhaler is covered.    Pending colonoscopy, patient will set up, I reminded her to proceed.    Patient complains of sinus symptoms including postnasal drip, sinus pressure and sinus headache.        Review of Systems   Constitutional: Negative.    HENT: Negative.     Respiratory: Negative.     Cardiovascular: Negative.    Gastrointestinal: Negative.    Musculoskeletal:  Positive for arthralgias and myalgias.   Neurological:         As per HPI   Hematological: Negative.    Psychiatric/Behavioral:  Negative.       Past Medical History:   Diagnosis Date    COPD (chronic obstructive pulmonary disease) (HCC)     GERD (gastroesophageal reflux disease)     Hypercholesterolemia     Iliotibial band tendonitis     Last Assessed: 2015    Major depressive disorder, recurrent ([facility]) 2013    Per CMS ICD 10 Guidelines--Per Physician      Migraine     Postprocedural pneumothorax 2021    Prinzmetal angina ([facility])     Last Assessed: 2017    Sciatica     Last Assessed: 11/10/2014    Thrush of mouth and esophagus  (HCC) 2021    Tobacco abuse 2018    Vertigo     Last Assessed: 2017     Past Surgical History:   Procedure Laterality Date    COLONOSCOPY  2011    Complete: Dr. Jurado - due in 5 years, Colonoscopy 2017, Diverticulosis, 5 mm polyp, Due in 5 years    ESOPHAGOGASTRODUODENOSCOPY      Diagnostic; Last Assessed: 2014    FLEXOR TENDON REPAIR      left finger    HYSTERECTOMY      @ agr 28    IR CHEST TUBE PLACEMENT  2021    VT LARYNGOSCOPY W/WO TRACHEOSCOPY DX EXCEPT  N/A 2019    Procedure: LARYNGOSCOPY DIRECT, FLEXIBLE BRONCHOSCOPY, FLEXIBLE ESOPHAGOSCOPY;  Surgeon: Jerry Hopper MD;  Location: [facility];  Service: ENT    SHOULDER ARTHROSCOPY Right     SHOULDER SURGERY      TOTAL ABDOMINAL HYSTERECTOMY W/ BILATERAL SALPINGOOPHORECTOMY      endometriosis; Last Assessed: 2015     Family History   Problem Relation Age of Onset    Lung cancer Mother 54    Other Father         Dyslipidemia    Diabetes Sister     No Known Problems Sister     No Known Problems Sister     No Known Problems Daughter     No Known Problems Daughter     Ovarian cancer Maternal Grandmother 53    No Known Problems Maternal Grandfather     Lung cancer Paternal Grandmother     No Known Problems Paternal Grandfather     No Known Problems Maternal Aunt     No Known Problems Paternal Aunt     No Known Problems Paternal Aunt     No Known Problems Paternal Aunt     Hypertension Family      Breast cancer Other         doesn't know age     Social History     Tobacco Use    Smoking status: Every Day     Current packs/day: 0.00     Average packs/day: 0.5 packs/day for 51.5 years (25.7 ttl pk-yrs)     Types: Cigarettes     Start date: 1970     Last attempt to quit: 7/1/2021     Years since quitting: 3.2    Smokeless tobacco: Never   Vaping Use    Vaping status: Former    Quit date: 7/1/2021   Substance and Sexual Activity    Alcohol use: No    Drug use: No    Sexual activity: Not on file     Current Outpatient Medications on File Prior to Visit   Medication Sig    albuterol (2.5 mg/3 mL) 0.083 % nebulizer solution NEBULIZE 1 VIAL EVERY 4 HOURS AS NEEDED FOR  WHEEZING OR SHORTNESS OF BREATH    Albuterol Sulfate (ProAir RespiClick) 108 (90 Base) MCG/ACT AEPB Inhale 2 puffs 4 (four) times a day as needed (wheezing and cough)    aspirin (ECOTRIN LOW STRENGTH) 81 mg EC tablet Take 1 tablet by mouth daily    Black Currant Seed Oil 500 MG CAPS Take 1,000 mg by mouth    Calcium Carbonate (CALCIUM 600 PO) Take by mouth    Cholecalciferol (Vitamin D3) 50 MCG (2000 UT) CHEW Chew    fluticasone (FLONASE) 50 mcg/act nasal spray 2 sprays into each nostril daily    Fluticasone-Salmeterol (Advair) 250-50 mcg/dose inhaler Inhale 1 puff 2 (two) times a day Rinse mouth after use.    Magnesium 250 MG TABS Take by mouth    nicotine (NICODERM CQ) 14 mg/24hr TD 24 hr patch Place 1 patch on the skin over 24 hours every 24 hours    nitroglycerin (NITROSTAT) 0.4 mg SL tablet Place 1 tablet (0.4 mg total) under the tongue every 5 (five) minutes as needed for chest pain    Potassium Gluconate 80 MG TABS Take by mouth    zinc gluconate 50 mg tablet Take 50 mg by mouth daily     Allergies   Allergen Reactions    Darvon [Propoxyphene] Itching    Crestor [Rosuvastatin] Myalgia     Immunization History   Administered Date(s) Administered    INFLUENZA 08/20/2015, 11/02/2017    Influenza Quadrivalent Preservative Free 3 years and older  "IM 08/17/2016, 11/02/2017    Influenza, recombinant, quadrivalent,injectable, preservative free 10/30/2018, 10/02/2019, 10/26/2020    Influenza, seasonal, injectable 1960, 09/01/2011, 01/24/2013    Pneumococcal Polysaccharide PPV23 11/15/2006, 11/02/2017     Objective     /82 (BP Location: Left arm, Patient Position: Sitting, Cuff Size: Large)   Pulse 72   Temp 97.8 °F (36.6 °C) (Temporal)   Resp 16   Ht 5' 1.75\" (1.568 m)   Wt 66.7 kg (147 lb)   SpO2 96%   BMI 27.10 kg/m²     Physical Exam  Vitals and nursing note reviewed.   Constitutional:       General: She is not in acute distress.     Appearance: Normal appearance. She is well-developed. She is not ill-appearing.   HENT:      Head: Normocephalic and atraumatic.      Right Ear: Tympanic membrane normal.      Left Ear: Tympanic membrane normal.      Nose: Congestion present.      Mouth/Throat:      Pharynx: Posterior oropharyngeal erythema (postnasal drip) present. No oropharyngeal exudate.   Eyes:      Conjunctiva/sclera: Conjunctivae normal.   Neck:      Thyroid: No thyromegaly.      Vascular: No carotid bruit.   Cardiovascular:      Rate and Rhythm: Normal rate and regular rhythm.      Heart sounds: Normal heart sounds. No murmur heard.  Pulmonary:      Effort: Pulmonary effort is normal. No respiratory distress.      Breath sounds: Normal breath sounds. No wheezing.   Abdominal:      General: There is no abdominal bruit.   Musculoskeletal:         General: Normal range of motion.      Cervical back: Neck supple.   Neurological:      General: No focal deficit present.      Mental Status: She is alert and oriented to person, place, and time.      Cranial Nerves: No cranial nerve deficit.      Coordination: Coordination normal.   Psychiatric:         Mood and Affect: Mood normal.         Behavior: Behavior normal.         "

## 2024-10-03 NOTE — ASSESSMENT & PLAN NOTE
Orders:    cyclobenzaprine (FLEXERIL) 5 mg tablet; Take 1 tablet twice a day and 2 tablets at bedtime as needed for painful muscle spasms

## 2024-10-06 ENCOUNTER — TELEPHONE (OUTPATIENT)
Dept: FAMILY MEDICINE CLINIC | Facility: CLINIC | Age: 64
End: 2024-10-06

## 2024-10-06 DIAGNOSIS — M51.369 DDD (DEGENERATIVE DISC DISEASE), LUMBAR: ICD-10-CM

## 2024-10-06 DIAGNOSIS — E78.2 MIXED HYPERLIPIDEMIA: Primary | Chronic | ICD-10-CM

## 2024-10-06 PROBLEM — G62.9 NEUROPATHY: Status: ACTIVE | Noted: 2024-10-06

## 2024-10-06 RX ORDER — CYCLOBENZAPRINE HCL 10 MG
TABLET ORAL
Qty: 180 TABLET | Refills: 1 | Status: SHIPPED | OUTPATIENT
Start: 2024-10-06

## 2024-10-06 RX ORDER — LOVASTATIN 20 MG
20 TABLET ORAL
Qty: 100 TABLET | Refills: 1 | Status: SHIPPED | OUTPATIENT
Start: 2024-10-06

## 2024-10-06 NOTE — ASSESSMENT & PLAN NOTE
CT chest July 2024   Slight interval enlargement of the dominant left upper lobe nodule. However, given the macroscopic fat, this is again consistent with a hamartoma. The remaining nodules are unchanged. However, given the semisolid left upper lobe nodule, continued annual surveillance is recommended.    Pending follow-up with [facility] pulmonology in November

## 2024-10-06 NOTE — ASSESSMENT & PLAN NOTE
Pending follow-up with [facility]'s pulmonology in November  Symptoms are fairly well-controlled on Advair discus 1 puff twice daily

## 2024-10-06 NOTE — ASSESSMENT & PLAN NOTE
DEXA June 2024: Osteoporosis of lumbar spine  Patient was evaluated by [facility]'s endocrinology and will be proceeding with further testing and treatment.

## 2024-10-07 NOTE — TELEPHONE ENCOUNTER
Please contact the patient.  Patient's pharmacy contacted us after last office visit regarding Rx coverage problems.    I did send prescription for generic lovastatin, new dose is 20 mg daily.  Insurance did not cover multiple dosing of Flexeril so I changed prescription for 10 mg tablets with the directions to take half a tablet twice a day as needed daytime intake full tablet at bedtime.  Thank you

## 2024-10-25 ENCOUNTER — LAB (OUTPATIENT)
Dept: LAB | Facility: CLINIC | Age: 64
End: 2024-10-25
Payer: MEDICARE

## 2024-10-25 DIAGNOSIS — M81.0 AGE-RELATED OSTEOPOROSIS WITHOUT CURRENT PATHOLOGICAL FRACTURE: ICD-10-CM

## 2024-10-25 LAB
25(OH)D3 SERPL-MCNC: 36 NG/ML (ref 30–100)
ALBUMIN SERPL BCG-MCNC: 4.1 G/DL (ref 3.5–5)
ALP SERPL-CCNC: 126 U/L (ref 34–104)
ALT SERPL W P-5'-P-CCNC: 27 U/L (ref 7–52)
ANION GAP SERPL CALCULATED.3IONS-SCNC: 9 MMOL/L (ref 4–13)
AST SERPL W P-5'-P-CCNC: 23 U/L (ref 13–39)
BILIRUB SERPL-MCNC: 0.41 MG/DL (ref 0.2–1)
BUN SERPL-MCNC: 10 MG/DL (ref 5–25)
CALCIUM 24H UR-MCNC: 207.4 MG/24 HRS (ref 100–300)
CALCIUM SERPL-MCNC: 9.1 MG/DL (ref 8.4–10.2)
CHLORIDE SERPL-SCNC: 104 MMOL/L (ref 96–108)
CO2 SERPL-SCNC: 26 MMOL/L (ref 21–32)
CREAT 24H UR-MRATE: 0.8 G/24HR (ref 0.6–1.8)
CREAT SERPL-MCNC: 0.69 MG/DL (ref 0.6–1.3)
GFR SERPL CREATININE-BSD FRML MDRD: 91 ML/MIN/1.73SQ M
GLUCOSE P FAST SERPL-MCNC: 122 MG/DL (ref 65–99)
MAGNESIUM SERPL-MCNC: 2.2 MG/DL (ref 1.9–2.7)
PHOSPHATE SERPL-MCNC: 3.5 MG/DL (ref 2.3–4.1)
POTASSIUM SERPL-SCNC: 3.9 MMOL/L (ref 3.5–5.3)
PROT SERPL-MCNC: 6.7 G/DL (ref 6.4–8.4)
PTH-INTACT SERPL-MCNC: 31.8 PG/ML (ref 12–88)
SODIUM SERPL-SCNC: 139 MMOL/L (ref 135–147)
SPECIMEN VOL UR: 1700 ML
SPECIMEN VOL UR: 1700 ML

## 2024-10-25 PROCEDURE — 82340 ASSAY OF CALCIUM IN URINE: CPT

## 2024-10-25 PROCEDURE — 84080 ASSAY ALKALINE PHOSPHATASES: CPT

## 2024-10-25 PROCEDURE — 36415 COLL VENOUS BLD VENIPUNCTURE: CPT

## 2024-10-25 PROCEDURE — 84100 ASSAY OF PHOSPHORUS: CPT

## 2024-10-25 PROCEDURE — 82570 ASSAY OF URINE CREATININE: CPT

## 2024-10-25 PROCEDURE — 83735 ASSAY OF MAGNESIUM: CPT

## 2024-10-25 PROCEDURE — 80053 COMPREHEN METABOLIC PANEL: CPT

## 2024-10-25 PROCEDURE — 83970 ASSAY OF PARATHORMONE: CPT

## 2024-10-25 PROCEDURE — 82306 VITAMIN D 25 HYDROXY: CPT

## 2024-10-29 LAB — ALP BONE SERPL-MCNC: 25.5 UG/L

## 2024-11-04 ENCOUNTER — OFFICE VISIT (OUTPATIENT)
Dept: ENDOCRINOLOGY | Facility: CLINIC | Age: 64
End: 2024-11-04
Payer: MEDICARE

## 2024-11-04 VITALS
DIASTOLIC BLOOD PRESSURE: 80 MMHG | WEIGHT: 140 LBS | BODY MASS INDEX: 26.43 KG/M2 | SYSTOLIC BLOOD PRESSURE: 120 MMHG | HEART RATE: 85 BPM | OXYGEN SATURATION: 98 % | HEIGHT: 61 IN

## 2024-11-04 DIAGNOSIS — E55.9 VITAMIN D DEFICIENCY: ICD-10-CM

## 2024-11-04 DIAGNOSIS — R74.8 ELEVATED ALKALINE PHOSPHATASE LEVEL: ICD-10-CM

## 2024-11-04 DIAGNOSIS — M81.0 AGE-RELATED OSTEOPOROSIS WITHOUT CURRENT PATHOLOGICAL FRACTURE: Primary | ICD-10-CM

## 2024-11-04 PROCEDURE — 99214 OFFICE O/P EST MOD 30 MIN: CPT

## 2024-11-04 NOTE — PATIENT INSTRUCTIONS
Please continue taking a vitamin D supplement.   Ensure you get about 1200mg of calcium, preferably in your diet.   Please do not hesitate to reach out to us if you change your mind.

## 2024-11-04 NOTE — PROGRESS NOTES
Natalia Horn 64 y.o. female MRN: [id number]    Encounter: [id number]      Assessment & Plan     Assessment & Plan  Age-related osteoporosis without current pathological fracture  64-year-old female with severe osteoporosis with multiple risk factors including premature menopause in the setting of hysterectomy and bilateral salpingo-oophorectomy in her 20s, history of smoking, vitamin D deficiency, inadequate calcium intake.  Given severity of her osteoporosis, patient will benefit from therapy.   At this time Natalia has opted not to proceed with medical management of osteoporosis and prefers tocontinue Vitamin D3 and calcium supplementation. Discussed with patient that despite this, it is anticipated that she will continue losing bone mass given age, early menopause and current smoking status.   Should she opt to pursue therapy in the future would recommend starting treatment with an anabolic agent, then transitioning to anti-resorbtive agent.     Plan:  Discussed importance of treatment for osteoporosis, including prevention of fracture in the future, as well as the morbidity and mortality associated with osteoporosis-related fractures.   Reviewed treatment options and side effects - provided patient with handout in case opts to pursue therapy in the future  Emphasized importance of regular calcium and vitamin D intake; Goal vitamin D >30 and calcium intake ~1200mg /day.  Advised patient that she does not need calcium supplements if dietary intake is adequate.   Continue weight bearing exercise as tolerated  Recommend repeat DXA in 2 years.  Follow up as needed       Vitamin D deficiency  Vitamin D levels currently at goal  Continue Vitamin D3 2000 units daily.                 CC:  osteoporosis    History of Present Illness      HPI:    Natalia Horn is a 64 y.o. female with a PMH of osteoporosis, vitamin D deficiency, Sjogren syndrome, COPD, CAD, hysterectomy and b/l salpingoophorectomy in 1981 in the  setting of endometriosis, current tobacco use, who is seen today in follow-up for the management of osteoporosis..  Last seen by endocrinology on 9/9/2024, at the time diagnosed with severe osteoporosis.  She underwent evaluation for secondary causes which was found to be unremarkable.  Was noted to have mildly elevated alkaline phosphatase level on prior labs, bone specific alkaline phosphatase was found to be within normal limits.  Thoracolumbar spine imaging did not demonstrate compression fractures.  She was diagnosed with osteoporosis after a DXA scan in June 2024 demonstrated osteoporosis at the lumbar spine with a T score of -3.2 and osteopenia and the left total hip and left femoral neck. Continues to take vitamin D3 2000 units daily.   Also continues to smoke.   States that at this time she is not interested in pursuing additional treatment for osteoporosis and prefers to continue taking vitamin D and calcium supplements  All other systems were reviewed and are negative.     Review of Systems   Constitutional:  Negative for activity change, appetite change, fatigue, fever and unexpected weight change.   HENT:  Negative for congestion, trouble swallowing and voice change.    Eyes:  Negative for visual disturbance.   Respiratory:  Negative for cough and shortness of breath.    Cardiovascular:  Negative for chest pain, palpitations and leg swelling.   Gastrointestinal:  Negative for abdominal pain, constipation, diarrhea, nausea and vomiting.   Endocrine: Negative for polydipsia and polyuria.   Genitourinary:  Negative for frequency.   Musculoskeletal:  Negative for myalgias.        +muscle spasms   Skin:  Negative for rash.   Neurological:  Negative for dizziness, tremors, facial asymmetry, weakness, light-headedness and headaches.   Psychiatric/Behavioral:  Negative for sleep disturbance.    All other systems reviewed and are negative.      Historical Information   Past Medical History:   Diagnosis Date     COPD (chronic obstructive pulmonary disease) (HCC)     GERD (gastroesophageal reflux disease)     Hypercholesterolemia     Iliotibial band tendonitis     Last Assessed: 2015    Major depressive disorder, recurrent (HCC) 2013    Per CMS ICD 10 Guidelines--Per Physician      Migraine     Postprocedural pneumothorax 2021    Prinzmetal angina (HCC)     Last Assessed: 2017    Sciatica     Last Assessed: 11/10/2014    Thrush of mouth and esophagus  (HCC) 2021    Tobacco abuse 2018    Vertigo     Last Assessed: 2017     Past Surgical History:   Procedure Laterality Date    COLONOSCOPY  2011    Complete: Dr. Jurado - due in 5 years, Colonoscopy 2017, Diverticulosis, 5 mm polyp, Due in 5 years    ESOPHAGOGASTRODUODENOSCOPY      Diagnostic; Last Assessed: 2014    FLEXOR TENDON REPAIR      left finger    HYSTERECTOMY      @ agr 28    IR CHEST TUBE PLACEMENT  2021    IL LARYNGOSCOPY W/WO TRACHEOSCOPY DX EXCEPT  N/A 2019    Procedure: LARYNGOSCOPY DIRECT, FLEXIBLE BRONCHOSCOPY, FLEXIBLE ESOPHAGOSCOPY;  Surgeon: Jerry Hopper MD;  Location: [facility];  Service: ENT    SHOULDER ARTHROSCOPY Right     SHOULDER SURGERY      TOTAL ABDOMINAL HYSTERECTOMY W/ BILATERAL SALPINGOOPHORECTOMY      endometriosis; Last Assessed: 2015     Social History   Social History     Substance and Sexual Activity   Alcohol Use No     Social History     Substance and Sexual Activity   Drug Use No     Social History     Tobacco Use   Smoking Status Every Day    Current packs/day: 0.00    Average packs/day: 0.5 packs/day for 51.5 years (25.7 ttl pk-yrs)    Types: Cigarettes    Start date:     Last attempt to quit: 2021    Years since quitting: 3.3   Smokeless Tobacco Never     Family History:   Family History   Problem Relation Age of Onset    Lung cancer Mother 54    Other Father         Dyslipidemia    Diabetes Sister     No Known Problems Sister     No Known Problems  Sister     No Known Problems Daughter     No Known Problems Daughter     Ovarian cancer Maternal Grandmother 53    No Known Problems Maternal Grandfather     Lung cancer Paternal Grandmother     No Known Problems Paternal Grandfather     No Known Problems Maternal Aunt     No Known Problems Paternal Aunt     No Known Problems Paternal Aunt     No Known Problems Paternal Aunt     Hypertension Family     Breast cancer Other         doesn't know age       Meds/Allergies   Current Outpatient Medications   Medication Sig Dispense Refill    albuterol (2.5 mg/3 mL) 0.083 % nebulizer solution NEBULIZE 1 VIAL EVERY 4 HOURS AS NEEDED FOR  WHEEZING OR SHORTNESS OF BREATH 360 mL 5    Albuterol Sulfate (ProAir RespiClick) 108 (90 Base) MCG/ACT AEPB Inhale 2 puffs 4 (four) times a day as needed (wheezing and cough) 1 each 3    aspirin (ECOTRIN LOW STRENGTH) 81 mg EC tablet Take 1 tablet by mouth daily      Black Currant Seed Oil 500 MG CAPS Take 1,000 mg by mouth      Calcium Carbonate (CALCIUM 600 PO) Take by mouth      Cholecalciferol (Vitamin D3) 50 MCG (2000 UT) CHEW Chew      cyclobenzaprine (FLEXERIL) 10 mg tablet Take half a tablet twice a day and 1 tablet at bedtime as needed for painful muscle spasms 180 tablet 1    fluticasone (FLONASE) 50 mcg/act nasal spray 2 sprays into each nostril daily 16 g 5    Fluticasone-Salmeterol (Advair) 250-50 mcg/dose inhaler Inhale 1 puff 2 (two) times a day Rinse mouth after use. 60 blister 5    lovastatin (MEVACOR) 20 mg tablet Take 1 tablet (20 mg total) by mouth daily at bedtime 100 tablet 1    Magnesium 250 MG TABS Take by mouth      nicotine (NICODERM CQ) 14 mg/24hr TD 24 hr patch Place 1 patch on the skin over 24 hours every 24 hours 28 patch 0    nitroglycerin (NITROSTAT) 0.4 mg SL tablet Place 1 tablet (0.4 mg total) under the tongue every 5 (five) minutes as needed for chest pain 25 tablet 1    Potassium Gluconate 80 MG TABS Take by mouth      zinc gluconate 50 mg tablet Take 50  mg by mouth daily       No current facility-administered medications for this visit.     Allergies   Allergen Reactions    Darvon [Propoxyphene] Itching    Crestor [Rosuvastatin] Myalgia       Objective   Vitals: not currently breastfeeding.    Physical Exam    The history was obtained from the review of the chart, patient.    Lab Results:   Lab Results   Component Value Date/Time    Potassium 3.9 10/25/2024 10:02 AM    Potassium 4.3 05/30/2024 10:49 AM    Chloride 104 10/25/2024 10:02 AM    Chloride 102 05/30/2024 10:49 AM    CO2 26 10/25/2024 10:02 AM    CO2 28 05/30/2024 10:49 AM    BUN 10 10/25/2024 10:02 AM    BUN 8 05/30/2024 10:49 AM    Creatinine 0.69 10/25/2024 10:02 AM    Creatinine 0.63 05/30/2024 10:49 AM    Glucose, Fasting 122 (H) 10/25/2024 10:02 AM    Glucose, Fasting 101 (H) 05/30/2024 10:49 AM    Calcium 9.1 10/25/2024 10:02 AM    Calcium 9.4 05/30/2024 10:49 AM    eGFR 91 10/25/2024 10:02 AM    eGFR 94 05/30/2024 10:49 AM    TSH 3RD GENERATON 2.124 05/30/2024 10:49 AM    PTH 31.8 10/25/2024 10:02 AM    Vit D, 25-Hydroxy 36.0 10/25/2024 10:02 AM    Vit D, 25-Hydroxy 27.0 (L) 05/30/2024 10:49 AM        Imaging Studies:         Results for orders placed during the hospital encounter of 06/26/24    DXA bone density spine hip and pelvis    Impression  1. Osteoporosis. Based on the lumbar spine.    2.  The 10 year risk of hip fracture is 2.2% with the 10 year risk of major osteoporotic fracture being 10% as calculated by the [employer] fracture risk assessment tool (FRAX, which is based on data generated by the WHO Collaborating [place]  for Metabolic Bone Diseases).    3.  The current NOF guidelines recommend treating patients with a T-score of -2.5 or less in the lumbar spine or hips, or in post-menopausal women and men over the age of 50 with low bone mass (osteopenia) and a FRAX 10 year risk score of >3% for hip  fracture and/or >20% for major osteoporotic fracture.    4.  The NOF  "recommends follow-up DXA in 1-2 years after initiating therapy for osteoporosis and every 2 years thereafter. More frequent evaluation is appropriate for patients with conditions associated with rapid bone loss, such as glucocorticoid  therapy. The interval between DXA screenings may be longer for individuals without major risk factors and initial T-score in the normal or upper low bone mass range.      The FRAX algorithm has certain limitations:  -FRAX has not been validated in patients currently or previously treated with pharmacotherapy for osteoporosis.  In such patients, clinical judgment must be exercised in interpreting FRAX scores.  -Prior hip, vertebral and humeral fragility fractures appear to confer greater risk of subsequent fracture than fractures at other sites (this is especially true for individuals with severe vertebral fractures), but quantification of this incremental  risk is not possible with FRAX.  -FRAX underestimates fracture risk in patients with history of multiple fragility fractures.  -FRAX may underestimate fracture risk in patients with history of frequent falls.  -It is not appropriate to use FRAX to monitor treatment response.      WHO CLASSIFICATION:  Normal (a T-score of -1.0 or higher)  Low bone mineral density (a T-score of less than -1.0 but higher than -2.5)  Osteoporosis (a T-score of -2.5 or less)  Severe osteoporosis (a T-score of -2.5 or less with a fragility fracture)              Workstation performed: [id number]             Results Review Statement: I reviewed radiology reports from this admission including: DXA scan.    Portions of the record may have been created with voice recognition software. Occasional wrong word or \"sound a like\" substitutions may have occurred due to the inherent limitations of voice recognition software. Read the chart carefully and recognize, using context, where substitutions have occurred.        "

## 2024-11-04 NOTE — ASSESSMENT & PLAN NOTE
64-year-old female with severe osteoporosis with multiple risk factors including premature menopause in the setting of hysterectomy and bilateral salpingo-oophorectomy in her 20s, history of smoking, vitamin D deficiency, inadequate calcium intake.  Given severity of her osteoporosis, patient will benefit from therapy.   At this time Natalia has opted not to proceed with medical management of osteoporosis and prefers tocontinue Vitamin D3 and calcium supplementation. Discussed with patient that despite this, it is anticipated that she will continue losing bone mass given age, early menopause and current smoking status.   Should she opt to pursue therapy in the future would recommend starting treatment with an anabolic agent, then transitioning to anti-resorbtive agent.     Plan:  Discussed importance of treatment for osteoporosis, including prevention of fracture in the future, as well as the morbidity and mortality associated with osteoporosis-related fractures.   Reviewed treatment options and side effects - provided patient with handout in case opts to pursue therapy in the future  Emphasized importance of regular calcium and vitamin D intake; Goal vitamin D >30 and calcium intake ~1200mg /day.  Advised patient that she does not need calcium supplements if dietary intake is adequate.   Continue weight bearing exercise as tolerated  Recommend repeat DXA in 2 years.  Follow up as needed

## 2024-11-06 ENCOUNTER — OFFICE VISIT (OUTPATIENT)
Dept: PULMONOLOGY | Facility: CLINIC | Age: 64
End: 2024-11-06
Payer: MEDICARE

## 2024-11-06 VITALS
SYSTOLIC BLOOD PRESSURE: 140 MMHG | HEIGHT: 61 IN | BODY MASS INDEX: 27.94 KG/M2 | DIASTOLIC BLOOD PRESSURE: 84 MMHG | OXYGEN SATURATION: 96 % | HEART RATE: 85 BPM | WEIGHT: 148 LBS | TEMPERATURE: 97.2 F

## 2024-11-06 DIAGNOSIS — F17.210 CIGARETTE NICOTINE DEPENDENCE WITHOUT COMPLICATION: ICD-10-CM

## 2024-11-06 DIAGNOSIS — J44.9 MODERATE COPD (CHRONIC OBSTRUCTIVE PULMONARY DISEASE) (HCC): ICD-10-CM

## 2024-11-06 DIAGNOSIS — R91.1 SOLITARY PULMONARY NODULE: Primary | ICD-10-CM

## 2024-11-06 PROCEDURE — 99213 OFFICE O/P EST LOW 20 MIN: CPT | Performed by: INTERNAL MEDICINE

## 2024-11-06 PROCEDURE — G2211 COMPLEX E/M VISIT ADD ON: HCPCS | Performed by: INTERNAL MEDICINE

## 2024-11-06 NOTE — PROGRESS NOTES
Progress Note - Pulmonary   Natalia Horn 64 y.o. female MRN: [id number]   Encounter: [id number]      Assessment/Plan:  Patient is a 64-year-old female with past medical history significant dependence, moderate COPD who presents for routine follow-up.  Overall, the patient reports her breathing is stable.  She has settled on Wixela as she is able to afford this as compared to the other LABA LAMA combination therapy.  She notes no exacerbation or use of prednisone.  May continue current therapy.  For lung nodules, CT scan reviewed and stable.  For nicotine dependence, she was counseled on smoking cessation.  She had adverse reactions to Chantix and failed Wellbutrin.  She is considering hypnotism.    Moderate COPD  - continue wixela   -  unable to afford LABA/LAMA  -  continue albuterol MDI and nebulizer              -  tubing provided     Lung nodule  -  stable lung nodules   -  repeat on/after 7/31/2025     Nicotine dependence  -  counseled on smoking cessation  -  currently smoking 1/2 ppd  -  intolerant of chantix; failed wellbutrin  -  considering hypnotism    1. Solitary pulmonary nodule  -     CT chest without contrast; Future; Expected date: 07/31/2025  2. 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 Cigarette nicotine dependence without complication      Patient may follow up in 12 months or sooner as necessary.     Orders:  Orders Placed This Encounter   Procedures    CT chest without contrast     Standing Status:   Future     Standing Expiration Date:   11/6/2028     Order Specific Question:   Reason for Exam     Answer:   lung nodule     Order Specific Question:   What is the patient's sedation requirement?     Answer:   No Sedation     Order Specific Question:   Release to patient through Mychart     Answer:   Immediate       Subjective:   The patient notes that she is doing well.  She denies any significant concerns about her breathing.  She is smoking about 1/2  "ppd.  She is intolerant to chantix.  She failed wellbutrin.  She does have fake cigarettes.  She is considering hypnotism.      She denies recent steroids or exacerbation.  She is rarely using her albuterol.      Inhaler Regimen:  Wixela - every day  Albuterol - 2x/week    Remainder of review of systems negative except as described in HPI.      The following portions of the patient's history were reviewed and updated as appropriate: allergies, current medications, past family history, past medical history, past social history, past surgical history and problem list.     Objective:   Vitals: Blood pressure 140/84, pulse 85, temperature (!) 97.2 °F (36.2 °C), height 5' 1\" (1.549 m), weight 67.1 kg (148 lb), SpO2 96%, not currently breastfeeding., RA, Body mass index is 27.96 kg/m².    Physical Exam  Gen: Pleasant, awake, alert, oriented x 3, no acute distress  HEENT: Mucous membranes moist, no oral lesions, no thrush  NECK: No accessory muscle use, JVP not elevated  Cardiac: RRR, single S1, single S2, no murmurs, no rubs, no gallops  Lungs: CTA b/l  Abdomen: normoactive bowel sounds, soft nontender, nondistended, no rebound or rigidity, no guarding  Extremities: no cyanosis, no clubbing, no LE edema  MSK:  Strength equal in all extremities  Derm:  No rashes/lesions noted  Neuro:  Appropriate mood/affect    Labs: I have personally reviewed pertinent lab results.  Lab Results   Component Value Date    WBC 8.91 08/15/2024    WBC 7.3 10/31/2017    HGB 17.4 (H) 08/15/2024    HGB 15.9 (H) 10/31/2017     08/15/2024     10/31/2017     Lab Results   Component Value Date    CREATININE 0.69 10/25/2024    CREATININE 0.81 01/18/2018        Imaging and other studies: Results Review Statement: I reviewed radiology reports from this admission including: CT chest.  7/31/2024  Radiology findings:  LUNGS: There are moderate manifestations of centrilobular emphysema, without lobar consolidations. There are multiple " pulmonary nodules, which will be described on series 3:  Image 38, left upper lobe, solid, smooth bordered, 11 x 10 mm, slightly enlarged from 9 x 9 mm in 2022. Again noted is the macroscopic fat content.  Image 42, posterior right upper lobe, groundglass, 4 mm, unchanged  Image 135, right minor fissure, solid, smooth bordered, 6 mm, unchanged perifissural nodule, favoring a benign intrapulmonary lymph node.  Image 142, right middle lobe, groundglass , 5 mm, unchanged.  Image 69, left upper lobe, semisolid, 6 x 8 mm, unchanged.  Image 88, left upper lobe, solid, smooth bordered, 3 mm, unchanged.  PLEURA: Unremarkable.  HEART/GREAT VESSELS: Heart is unremarkable for patient's age. Moderate aortic arch calcification. No associated aneurysm.  MEDIASTINUM AND KIANNA: Unremarkable.  CHEST WALL AND LOWER NECK: Unremarkable.    Pulmonary Function Testing: Results Review Statement: I reviewed radiology reports from this admission including: PFT.  5/27/2021:  FEV1/FVC Ratio: 59 %  Forced Vital Capacity: 2/63 L    92 % predicted  FEV1: 1.54 L     65 % predicted  Lung volumes by body plethysmography:   Total Lung Capacity 91 % predicted   Residual volume 80 % predicted  DLCO corrected for patients hemoglobin level: 58 %    Gomez Prather MD  [facility]

## 2025-02-03 ENCOUNTER — OFFICE VISIT (OUTPATIENT)
Dept: FAMILY MEDICINE CLINIC | Facility: CLINIC | Age: 65
End: 2025-02-03
Payer: MEDICARE

## 2025-02-03 VITALS
OXYGEN SATURATION: 96 % | TEMPERATURE: 97.6 F | WEIGHT: 150 LBS | HEART RATE: 73 BPM | RESPIRATION RATE: 18 BRPM | BODY MASS INDEX: 28.32 KG/M2 | SYSTOLIC BLOOD PRESSURE: 132 MMHG | HEIGHT: 61 IN | DIASTOLIC BLOOD PRESSURE: 80 MMHG

## 2025-02-03 DIAGNOSIS — F17.210 CIGARETTE NICOTINE DEPENDENCE WITHOUT COMPLICATION: ICD-10-CM

## 2025-02-03 DIAGNOSIS — J32.9 SINUSITIS, UNSPECIFIED CHRONICITY, UNSPECIFIED LOCATION: ICD-10-CM

## 2025-02-03 DIAGNOSIS — I25.10 ATHEROSCLEROSIS OF CORONARY ARTERY OF NATIVE HEART WITHOUT ANGINA PECTORIS, UNSPECIFIED VESSEL OR LESION TYPE: Chronic | ICD-10-CM

## 2025-02-03 DIAGNOSIS — M79.10 MYALGIA: ICD-10-CM

## 2025-02-03 DIAGNOSIS — Z12.11 COLON CANCER SCREENING: ICD-10-CM

## 2025-02-03 DIAGNOSIS — E78.2 MIXED HYPERLIPIDEMIA: Chronic | ICD-10-CM

## 2025-02-03 DIAGNOSIS — M81.0 AGE-RELATED OSTEOPOROSIS WITHOUT CURRENT PATHOLOGICAL FRACTURE: ICD-10-CM

## 2025-02-03 DIAGNOSIS — J44.9 MODERATE COPD (CHRONIC OBSTRUCTIVE PULMONARY DISEASE) (HCC): Primary | ICD-10-CM

## 2025-02-03 PROCEDURE — G2211 COMPLEX E/M VISIT ADD ON: HCPCS | Performed by: FAMILY MEDICINE

## 2025-02-03 PROCEDURE — 99214 OFFICE O/P EST MOD 30 MIN: CPT | Performed by: FAMILY MEDICINE

## 2025-02-03 RX ORDER — FLUTICASONE PROPIONATE 50 MCG
2 SPRAY, SUSPENSION (ML) NASAL DAILY
Qty: 16 G | Refills: 5 | Status: SHIPPED | OUTPATIENT
Start: 2025-02-03

## 2025-02-03 RX ORDER — NITROGLYCERIN 0.4 MG/1
0.4 TABLET SUBLINGUAL
Qty: 25 TABLET | Refills: 1 | Status: SHIPPED | OUTPATIENT
Start: 2025-02-03

## 2025-02-03 RX ORDER — NICOTINE 21 MG/24HR
1 PATCH, TRANSDERMAL 24 HOURS TRANSDERMAL EVERY 24 HOURS
Qty: 28 PATCH | Refills: 1 | Status: SHIPPED | OUTPATIENT
Start: 2025-02-03

## 2025-02-03 NOTE — ASSESSMENT & PLAN NOTE
Lovastatin 20 mg daily.  Tolerates Rx well with no complaints of muscle cramps.  Orders:  •  CBC and differential; Future  •  Comprehensive metabolic panel; Future  •  Lipid Panel with Direct LDL reflex; Future  •  TSH, 3rd generation; Future  •  CK; Future

## 2025-02-03 NOTE — ASSESSMENT & PLAN NOTE
Patient is interested in trial of nicotine replacement patches.  I strongly advised her to quit smoking  Orders:  •  nicotine (NICODERM CQ) 21 mg/24 hr TD 24 hr patch; Place 1 patch on the skin over 24 hours every 24 hours  •  nicotine (NICODERM CQ) 14 mg/24hr TD 24 hr patch; Place 1 patch on the skin over 24 hours every 24 hours  •  nicotine (NICODERM CQ) 7 mg/24hr TD 24 hr patch; Place 1 patch on the skin over 24 hours every 24 hours

## 2025-02-03 NOTE — PROGRESS NOTES
Name: Natalia Horn      : 1960      MRN: [id number]  Encounter Provider: Mercedes Friedman MD  Encounter Date: 2/3/2025   Encounter department: [facility]    Assessment & Plan  Moderate COPD (chronic obstructive pulmonary disease) (HCC)  Patient follows with [facility] pulmonology  Advair discus, uses once a day, should increase dose to twice a day if needed  Annual CT chest monitoring       Mixed hyperlipidemia  Lovastatin 20 mg daily.  Tolerates Rx well with no complaints of muscle cramps.  Orders:  •  CBC and differential; Future  •  Comprehensive metabolic panel; Future  •  Lipid Panel with Direct LDL reflex; Future  •  TSH, 3rd generation; Future  •  CK; Future    Colon cancer screening  Past due colonoscopy  Orders:  •  Ambulatory Referral to Gastroenterology; Future    Atherosclerosis of coronary artery of native heart without angina pectoris, unspecified vessel or lesion type  Asymptomatic.  No angina, dyspnea or palpitations.  On aspirin and statin.  Keeps nitroglycerin on hand.  Never uses Rx.  Orders:  •  nitroglycerin (NITROSTAT) 0.4 mg SL tablet; Place 1 tablet (0.4 mg total) under the tongue every 5 (five) minutes as needed for chest pain    Cigarette nicotine dependence without complication  Patient is interested in trial of nicotine replacement patches.  I strongly advised her to quit smoking  Orders:  •  nicotine (NICODERM CQ) 21 mg/24 hr TD 24 hr patch; Place 1 patch on the skin over 24 hours every 24 hours  •  nicotine (NICODERM CQ) 14 mg/24hr TD 24 hr patch; Place 1 patch on the skin over 24 hours every 24 hours  •  nicotine (NICODERM CQ) 7 mg/24hr TD 24 hr patch; Place 1 patch on the skin over 24 hours every 24 hours    Sinusitis, unspecified chronicity, unspecified location  Chronic symptoms of sinus congestion.  Patient is reluctant to proceed with ENT exam.  Start saline rinses daytime and Flonase nighttime.  Orders:  •  fluticasone (FLONASE) 50 mcg/act  nasal spray; 2 sprays into each nostril daily    Age-related osteoporosis without current pathological fracture  Evaluated by endocrinology November 2024.  Declined Rx.  Will continue with calcium rich diet, vitamin D supplements and weightbearing and strengthening exercises.       Myalgia  Chronic symptoms of myalgias have improved.  Patient uses OTC supplements.  Recent evaluation by rheumatology.  No evidence of connective tissue disease.  Continue PRN Flexeril.  She utilizes daily magnesium and may try magnesium oil           Return in about 6 months (around 8/3/2025) for follow up, Medicare wellness.    Patient Instructions   Prescription for nicotine replacement patches sent  12-hour fasting blood work  Please limit calcium tablet to 1 daily and consume calcium rich  Please walk and do strength training exercises for your arms  Referral for colonoscopy  Please use Flonase at night and saline nasal spray throughout the day.  If right sided sinus pressure is recurrent-we will reach out to ENT doctor.  Please let me know  Wixela should be used twice a day.  If breathing symptoms are well-controlled you may reduce dose to once a day  May consider trial of magnesium oil for chronic muscle aches    History of Present Illness      Started OTC supplement for neuropathy and reports improved symptoms of chronic myalgias.   Evaluated by rheumatology, no evidence of CTD.   Remains on Flexeril, magnesium tablets, feels better.  Muscle cramping and painful spasms have diminished    Recent evaluation by [facility] endocrinology due to diagnosis of osteoporosis.  Patient has declined Rx.     She follows with [facility] pulmonology, smokes half a pack daily.  Chantix and Wellbutrin caused side effects.  Interested in nicotine replacement patches.  Last CT chest summer 2024, will be following up annually as per Dr. Prather's directions.     Right upper quadrant ultrasound June 2024: 2 small stable gallbladder polyps with no  concerning findings.     GERD symptoms have been well-controlled, no Rx    Last colonoscopy 2017, past due, history of diverticulosis and one 5 mm polyp    Current Rx include aspirin, lovastatin and Wixela, uses Wixela once a day.  Admits to occasional shortness of breath.     Complains of right-sided sinus pressure which is usually worse in the morning.  Patient has Flonase on hand but does not use it on a regular basis.  She has been trying Vicks OTC.     Patient is requesting refill of nitroglycerin, she keeps Rx on hand.  Never used it.  Denies symptoms of angina, palpitations or dizziness.     Ultrasound right upper quadrant ultrasound June 2024, 2 small stable gallbladder polyps, no concerning finding           Review of Systems   Constitutional: Negative.    HENT:  Positive for congestion and sinus pressure.    Eyes: Negative.    Respiratory:  Positive for shortness of breath.    Cardiovascular: Negative.    Gastrointestinal: Negative.    Endocrine: Negative.    Musculoskeletal:  Positive for myalgias.   Allergic/Immunologic: Negative.    Neurological: Negative.    Hematological: Negative.    Psychiatric/Behavioral: Negative.       Past Medical History:   Diagnosis Date   • COPD (chronic obstructive pulmonary disease) (HCC)    • GERD (gastroesophageal reflux disease)    • Hypercholesterolemia    • Iliotibial band tendonitis     Last Assessed: 6/5/2015   • Major depressive disorder, recurrent (HCC) 05/09/2013    Per CMS ICD 10 Guidelines--Per Physician     • Migraine    • Postprocedural pneumothorax 07/09/2021   • Prinzmetal angina (HCC)     Last Assessed: 5/23/2017   • Sciatica     Last Assessed: 11/10/2014   • Thrush of mouth and esophagus  (HCC) 06/30/2021   • Tobacco abuse 04/19/2018   • Vertigo     Last Assessed: 4/12/2017     Past Surgical History:   Procedure Laterality Date   • COLONOSCOPY  06/2011    Complete: Dr. Jurado - due in 5 years, Colonoscopy Nov 2017, Diverticulosis, 5 mm polyp, Due in 5  years   • ESOPHAGOGASTRODUODENOSCOPY      Diagnostic; Last Assessed: 2014   • FLEXOR TENDON REPAIR      left finger   • HYSTERECTOMY      @ [age]r 28   • IR CHEST TUBE PLACEMENT  2021   • GA LARYNGOSCOPY W/WO TRACHEOSCOPY DX EXCEPT  N/A 2019    Procedure: LARYNGOSCOPY DIRECT, FLEXIBLE BRONCHOSCOPY, FLEXIBLE ESOPHAGOSCOPY;  Surgeon: Jerry Hopper MD;  Location: [facility];  Service: ENT   • SHOULDER ARTHROSCOPY Right    • SHOULDER SURGERY     • TOTAL ABDOMINAL HYSTERECTOMY W/ BILATERAL SALPINGOOPHORECTOMY      endometriosis; Last Assessed: 2015     Family History   Problem Relation Age of Onset   • Lung cancer Mother 54   • Other Father         Dyslipidemia   • Diabetes Sister    • No Known Problems Sister    • No Known Problems Sister    • No Known Problems Daughter    • No Known Problems Daughter    • Ovarian cancer Maternal Grandmother 53   • No Known Problems Maternal Grandfather    • Lung cancer Paternal Grandmother    • No Known Problems Paternal Grandfather    • No Known Problems Maternal Aunt    • No Known Problems Paternal Aunt    • No Known Problems Paternal Aunt    • No Known Problems Paternal Aunt    • Hypertension Family    • Breast cancer Other         doesn't know age     Social History     Tobacco Use   • Smoking status: Every Day     Current packs/day: 0.00     Average packs/day: 0.5 packs/day for 51.5 years (25.7 ttl pk-yrs)     Types: Cigarettes     Start date:      Last attempt to quit: 2021     Years since quitting: 3.6   • Smokeless tobacco: Never   Vaping Use   • Vaping status: Former   • Quit date: 2021   Substance and Sexual Activity   • Alcohol use: No   • Drug use: No   • Sexual activity: Not on file     Current Outpatient Medications on File Prior to Visit   Medication Sig   • albuterol (2.5 mg/3 mL) 0.083 % nebulizer solution NEBULIZE 1 VIAL EVERY 4 HOURS AS NEEDED FOR  WHEEZING OR SHORTNESS OF BREATH   • Albuterol Sulfate (ProAir RespiClick) 108 (90  "Base) MCG/ACT AEPB Inhale 2 puffs 4 (four) times a day as needed (wheezing and cough)   • aspirin (ECOTRIN LOW STRENGTH) 81 mg EC tablet Take 1 tablet by mouth daily   • Black Currant Seed Oil 500 MG CAPS Take 1,000 mg by mouth   • Calcium Carbonate (CALCIUM 600 PO) Take by mouth   • Cholecalciferol (Vitamin D3) 50 MCG (2000 UT) CHEW Chew   • cyclobenzaprine (FLEXERIL) 10 mg tablet Take half a tablet twice a day and 1 tablet at bedtime as needed for painful muscle spasms   • lovastatin (MEVACOR) 20 mg tablet Take 1 tablet (20 mg total) by mouth daily at bedtime   • Magnesium 250 MG TABS Take by mouth   • Potassium Gluconate 80 MG TABS Take by mouth   • zinc gluconate 50 mg tablet Take 50 mg by mouth daily   • Fluticasone-Salmeterol (Advair) 250-50 mcg/dose inhaler Inhale 1 puff 2 (two) times a day Rinse mouth after use.     Allergies   Allergen Reactions   • Darvon [Propoxyphene] Itching   • Crestor [Rosuvastatin] Myalgia     Immunization History   Administered Date(s) Administered   • INFLUENZA 08/20/2015, 11/02/2017   • Influenza Quadrivalent Preservative Free 3 years and older IM 08/17/2016, 11/02/2017   • Influenza, recombinant, quadrivalent,injectable, preservative free 10/30/2018, 10/02/2019, 10/26/2020   • Influenza, seasonal, injectable 1960, 09/01/2011, 01/24/2013   • Pneumococcal Polysaccharide PPV23 11/15/2006, 11/02/2017     Objective   /80   Pulse 73   Temp 97.6 °F (36.4 °C) (Temporal)   Resp 18   Ht 5' 1\" (1.549 m)   Wt 68 kg (150 lb)   SpO2 96%   BMI 28.34 kg/m²     Physical Exam  Vitals and nursing note reviewed.   Constitutional:       General: She is not in acute distress.     Appearance: Normal appearance. She is well-developed. She is not ill-appearing.   HENT:      Head: Normocephalic and atraumatic.   Eyes:      General: No scleral icterus.     Conjunctiva/sclera: Conjunctivae normal.   Neck:      Thyroid: No thyromegaly.      Vascular: No carotid bruit.   Cardiovascular:     "  Rate and Rhythm: Normal rate and regular rhythm.      Heart sounds: Normal heart sounds. No murmur heard.  Pulmonary:      Effort: Pulmonary effort is normal. No respiratory distress.      Breath sounds: Normal breath sounds. No wheezing.   Abdominal:      General: Bowel sounds are normal. There is no abdominal bruit.   Musculoskeletal:         General: Normal range of motion.      Cervical back: Neck supple.      Right lower leg: No edema.      Left lower leg: No edema.   Neurological:      General: No focal deficit present.      Mental Status: She is alert and oriented to person, place, and time.      Cranial Nerves: No cranial nerve deficit.      Coordination: Coordination normal.   Psychiatric:         Mood and Affect: Mood normal.         Behavior: Behavior normal.

## 2025-02-03 NOTE — PATIENT INSTRUCTIONS
Prescription for nicotine replacement patches sent  12-hour fasting blood work  Please limit calcium tablet to 1 daily and consume calcium rich  Please walk and do strength training exercises for your arms  Referral for colonoscopy  Please use Flonase at night and saline nasal spray throughout the day.  If right sided sinus pressure is recurrent-we will reach out to ENT doctor.  Please let me know  Wixela should be used twice a day.  If breathing symptoms are well-controlled you may reduce dose to once a day  May consider trial of magnesium oil for chronic muscle aches

## 2025-02-03 NOTE — ASSESSMENT & PLAN NOTE
Patient follows with [facility] pulmonology  Advair discus, uses once a day, should increase dose to twice a day if needed  Annual CT chest monitoring

## 2025-02-05 NOTE — ASSESSMENT & PLAN NOTE
Asymptomatic.  No angina, dyspnea or palpitations.  On aspirin and statin.  Keeps nitroglycerin on hand.  Never uses Rx.  Orders:  •  nitroglycerin (NITROSTAT) 0.4 mg SL tablet; Place 1 tablet (0.4 mg total) under the tongue every 5 (five) minutes as needed for chest pain

## 2025-02-05 NOTE — ASSESSMENT & PLAN NOTE
Evaluated by endocrinology November 2024.  Declined Rx.  Will continue with calcium rich diet, vitamin D supplements and weightbearing and strengthening exercises.

## 2025-02-05 NOTE — ASSESSMENT & PLAN NOTE
Chronic symptoms of myalgias have improved.  Patient uses OTC supplements.  Recent evaluation by rheumatology.  No evidence of connective tissue disease.  Continue PRN Flexeril.  She utilizes daily magnesium and may try magnesium oil

## 2025-06-24 DIAGNOSIS — J44.9 MODERATE COPD (CHRONIC OBSTRUCTIVE PULMONARY DISEASE) (HCC): ICD-10-CM

## 2025-06-25 RX ORDER — FLUTICASONE PROPIONATE AND SALMETEROL 250; 50 UG/1; UG/1
POWDER RESPIRATORY (INHALATION)
Qty: 60 BLISTER | Refills: 5 | Status: SHIPPED | OUTPATIENT
Start: 2025-06-25

## 2025-07-31 ENCOUNTER — HOSPITAL ENCOUNTER (OUTPATIENT)
Dept: CT IMAGING | Facility: HOSPITAL | Age: 65
Discharge: HOME | End: 2025-07-31
Attending: INTERNAL MEDICINE
Payer: MEDICARE

## 2025-07-31 DIAGNOSIS — R91.1 SOLITARY PULMONARY NODULE: ICD-10-CM

## 2025-07-31 PROCEDURE — 71250 CT THORAX DX C-: CPT

## 2025-08-01 ENCOUNTER — TELEPHONE (OUTPATIENT)
Dept: FAMILY MEDICINE CLINIC | Facility: CLINIC | Age: 65
End: 2025-08-01

## 2025-08-01 ENCOUNTER — APPOINTMENT (OUTPATIENT)
Dept: LAB | Facility: CLINIC | Age: 65
End: 2025-08-01
Payer: MEDICARE

## 2025-08-01 DIAGNOSIS — E78.2 MIXED HYPERLIPIDEMIA: Chronic | ICD-10-CM

## 2025-08-01 DIAGNOSIS — R73.9 HYPERGLYCEMIA: ICD-10-CM

## 2025-08-01 DIAGNOSIS — R73.9 HYPERGLYCEMIA: Primary | ICD-10-CM

## 2025-08-01 LAB
ALBUMIN SERPL BCG-MCNC: 4 G/DL (ref 3.5–5)
ALP SERPL-CCNC: 118 U/L (ref 34–104)
ALT SERPL W P-5'-P-CCNC: 29 U/L (ref 7–52)
ANION GAP SERPL CALCULATED.3IONS-SCNC: 7 MMOL/L (ref 4–13)
AST SERPL W P-5'-P-CCNC: 23 U/L (ref 13–39)
BASOPHILS # BLD AUTO: 0.05 THOUSANDS/ÂΜL (ref 0–0.1)
BASOPHILS NFR BLD AUTO: 1 % (ref 0–1)
BILIRUB SERPL-MCNC: 0.5 MG/DL (ref 0.2–1)
BUN SERPL-MCNC: 10 MG/DL (ref 5–25)
CALCIUM SERPL-MCNC: 9 MG/DL (ref 8.4–10.2)
CHLORIDE SERPL-SCNC: 103 MMOL/L (ref 96–108)
CHOLEST SERPL-MCNC: 236 MG/DL (ref ?–200)
CK SERPL-CCNC: 96 U/L (ref 26–192)
CO2 SERPL-SCNC: 27 MMOL/L (ref 21–32)
CREAT SERPL-MCNC: 0.72 MG/DL (ref 0.6–1.3)
EOSINOPHIL # BLD AUTO: 0.2 THOUSAND/ÂΜL (ref 0–0.61)
EOSINOPHIL NFR BLD AUTO: 2 % (ref 0–6)
ERYTHROCYTE [DISTWIDTH] IN BLOOD BY AUTOMATED COUNT: 13 % (ref 11.6–15.1)
EST. AVERAGE GLUCOSE BLD GHB EST-MCNC: 134 MG/DL
GFR SERPL CREATININE-BSD FRML MDRD: 87 ML/MIN/1.73SQ M
GLUCOSE P FAST SERPL-MCNC: 137 MG/DL (ref 65–99)
HBA1C MFR BLD: 6.3 %
HCT VFR BLD AUTO: 51.6 % (ref 34.8–46.1)
HDLC SERPL-MCNC: 46 MG/DL
HGB BLD-MCNC: 16.9 G/DL (ref 11.5–15.4)
IMM GRANULOCYTES # BLD AUTO: 0.03 THOUSAND/UL (ref 0–0.2)
IMM GRANULOCYTES NFR BLD AUTO: 0 % (ref 0–2)
LDLC SERPL CALC-MCNC: 160 MG/DL (ref 0–100)
LYMPHOCYTES # BLD AUTO: 2.43 THOUSANDS/ÂΜL (ref 0.6–4.47)
LYMPHOCYTES NFR BLD AUTO: 28 % (ref 14–44)
MCH RBC QN AUTO: 29.9 PG (ref 26.8–34.3)
MCHC RBC AUTO-ENTMCNC: 32.8 G/DL (ref 31.4–37.4)
MCV RBC AUTO: 91 FL (ref 82–98)
MONOCYTES # BLD AUTO: 0.66 THOUSAND/ÂΜL (ref 0.17–1.22)
MONOCYTES NFR BLD AUTO: 8 % (ref 4–12)
NEUTROPHILS # BLD AUTO: 5.23 THOUSANDS/ÂΜL (ref 1.85–7.62)
NEUTS SEG NFR BLD AUTO: 61 % (ref 43–75)
NRBC BLD AUTO-RTO: 0 /100 WBCS
PLATELET # BLD AUTO: 184 THOUSANDS/UL (ref 149–390)
PMV BLD AUTO: 12.8 FL (ref 8.9–12.7)
POTASSIUM SERPL-SCNC: 4.1 MMOL/L (ref 3.5–5.3)
PROT SERPL-MCNC: 6.8 G/DL (ref 6.4–8.4)
RBC # BLD AUTO: 5.65 MILLION/UL (ref 3.81–5.12)
SODIUM SERPL-SCNC: 137 MMOL/L (ref 135–147)
TRIGL SERPL-MCNC: 152 MG/DL (ref ?–150)
TSH SERPL DL<=0.05 MIU/L-ACNC: 2.69 UIU/ML (ref 0.45–4.5)
WBC # BLD AUTO: 8.6 THOUSAND/UL (ref 4.31–10.16)

## 2025-08-01 PROCEDURE — 80053 COMPREHEN METABOLIC PANEL: CPT

## 2025-08-01 PROCEDURE — 80061 LIPID PANEL: CPT

## 2025-08-01 PROCEDURE — 85025 COMPLETE CBC W/AUTO DIFF WBC: CPT

## 2025-08-01 PROCEDURE — 84443 ASSAY THYROID STIM HORMONE: CPT

## 2025-08-01 PROCEDURE — 83036 HEMOGLOBIN GLYCOSYLATED A1C: CPT

## 2025-08-01 PROCEDURE — 36415 COLL VENOUS BLD VENIPUNCTURE: CPT

## 2025-08-01 PROCEDURE — 82550 ASSAY OF CK (CPK): CPT

## 2025-08-04 ENCOUNTER — OFFICE VISIT (OUTPATIENT)
Dept: FAMILY MEDICINE CLINIC | Facility: CLINIC | Age: 65
End: 2025-08-04
Payer: MEDICARE

## 2025-08-04 VITALS
SYSTOLIC BLOOD PRESSURE: 128 MMHG | WEIGHT: 147.4 LBS | HEART RATE: 67 BPM | TEMPERATURE: 98 F | HEIGHT: 61 IN | DIASTOLIC BLOOD PRESSURE: 82 MMHG | BODY MASS INDEX: 27.83 KG/M2 | OXYGEN SATURATION: 94 % | RESPIRATION RATE: 16 BRPM

## 2025-08-04 DIAGNOSIS — R91.1 SOLITARY PULMONARY NODULE: ICD-10-CM

## 2025-08-04 DIAGNOSIS — R73.9 HYPERGLYCEMIA: ICD-10-CM

## 2025-08-04 DIAGNOSIS — I25.10 ATHEROSCLEROSIS OF CORONARY ARTERY OF NATIVE HEART WITHOUT ANGINA PECTORIS, UNSPECIFIED VESSEL OR LESION TYPE: Primary | ICD-10-CM

## 2025-08-04 DIAGNOSIS — E78.2 MIXED HYPERLIPIDEMIA: Chronic | ICD-10-CM

## 2025-08-04 DIAGNOSIS — Z00.00 MEDICARE ANNUAL WELLNESS VISIT, SUBSEQUENT: ICD-10-CM

## 2025-08-04 DIAGNOSIS — Z12.31 ENCOUNTER FOR SCREENING MAMMOGRAM FOR BREAST CANCER: ICD-10-CM

## 2025-08-04 DIAGNOSIS — Z78.9 STATIN INTOLERANCE: ICD-10-CM

## 2025-08-04 DIAGNOSIS — J44.9 MODERATE COPD (CHRONIC OBSTRUCTIVE PULMONARY DISEASE) (HCC): ICD-10-CM

## 2025-08-04 DIAGNOSIS — M81.0 AGE-RELATED OSTEOPOROSIS WITHOUT CURRENT PATHOLOGICAL FRACTURE: ICD-10-CM

## 2025-08-04 PROCEDURE — G2211 COMPLEX E/M VISIT ADD ON: HCPCS | Performed by: FAMILY MEDICINE

## 2025-08-04 PROCEDURE — 99214 OFFICE O/P EST MOD 30 MIN: CPT | Performed by: FAMILY MEDICINE

## 2025-08-04 PROCEDURE — G0439 PPPS, SUBSEQ VISIT: HCPCS | Performed by: FAMILY MEDICINE

## 2025-08-04 RX ORDER — ALBUTEROL SULFATE 90 UG/1
2 INHALANT RESPIRATORY (INHALATION) EVERY 4 HOURS PRN
Qty: 8.5 G | Refills: 5 | Status: SHIPPED | OUTPATIENT
Start: 2025-08-04

## 2025-08-04 RX ORDER — MULTIVITAMIN WITH IRON
TABLET ORAL DAILY
COMMUNITY

## 2025-08-04 RX ORDER — EZETIMIBE 10 MG/1
10 TABLET ORAL DAILY
Qty: 30 TABLET | Refills: 5 | Status: SHIPPED | OUTPATIENT
Start: 2025-08-04 | End: 2026-07-30

## 2025-08-04 RX ORDER — FLUTICASONE PROPIONATE AND SALMETEROL 250; 50 UG/1; UG/1
1 POWDER RESPIRATORY (INHALATION) 2 TIMES DAILY
Qty: 60 BLISTER | Refills: 5 | Status: SHIPPED | OUTPATIENT
Start: 2025-08-04

## 2025-08-12 ENCOUNTER — DOCUMENTATION (OUTPATIENT)
Dept: PULMONOLOGY | Facility: CLINIC | Age: 65
End: 2025-08-12

## 2025-08-19 ENCOUNTER — APPOINTMENT (OUTPATIENT)
Dept: RADIOLOGY | Facility: HOSPITAL | Age: 65
End: 2025-08-19
Attending: INTERNAL MEDICINE
Payer: MEDICARE

## 2025-08-19 ENCOUNTER — ANESTHESIA (OUTPATIENT)
Dept: PERIOP | Facility: HOSPITAL | Age: 65
End: 2025-08-19
Payer: MEDICARE

## 2025-08-19 ENCOUNTER — HOSPITAL ENCOUNTER (OUTPATIENT)
Dept: PERIOP | Facility: HOSPITAL | Age: 65
Setting detail: OUTPATIENT SURGERY
Discharge: HOME | End: 2025-08-19
Attending: INTERNAL MEDICINE
Payer: MEDICARE

## 2025-08-19 ENCOUNTER — DOCUMENTATION (OUTPATIENT)
Dept: HEMATOLOGY ONCOLOGY | Facility: CLINIC | Age: 65
End: 2025-08-19

## 2025-08-19 ENCOUNTER — HOSPITAL ENCOUNTER (OUTPATIENT)
Dept: RADIOLOGY | Facility: HOSPITAL | Age: 65
Setting detail: OUTPATIENT SURGERY
Discharge: HOME | End: 2025-08-19
Payer: MEDICARE

## 2025-08-19 ENCOUNTER — HOSPITAL ENCOUNTER (OUTPATIENT)
Dept: RADIOLOGY | Facility: HOSPITAL | Age: 65
Discharge: HOME | End: 2025-08-19
Attending: INTERNAL MEDICINE
Payer: MEDICARE

## 2025-08-19 ENCOUNTER — ANESTHESIA EVENT (OUTPATIENT)
Dept: PERIOP | Facility: HOSPITAL | Age: 65
End: 2025-08-19
Payer: MEDICARE

## 2025-08-19 VITALS
BODY MASS INDEX: 26.87 KG/M2 | TEMPERATURE: 97.1 F | DIASTOLIC BLOOD PRESSURE: 63 MMHG | OXYGEN SATURATION: 90 % | RESPIRATION RATE: 20 BRPM | SYSTOLIC BLOOD PRESSURE: 121 MMHG | HEART RATE: 83 BPM | WEIGHT: 146 LBS | HEIGHT: 62 IN

## 2025-08-19 DIAGNOSIS — J44.9 COPD, MODERATE (HCC): Primary | ICD-10-CM

## 2025-08-19 DIAGNOSIS — C34.12 MALIGNANT NEOPLASM OF UPPER LOBE OF LEFT LUNG (HCC): ICD-10-CM

## 2025-08-19 DIAGNOSIS — R91.1 PULMONARY NODULE: ICD-10-CM

## 2025-08-19 DIAGNOSIS — F17.210 CIGARETTE NICOTINE DEPENDENCE WITHOUT COMPLICATION: ICD-10-CM

## 2025-08-19 PROCEDURE — 71045 X-RAY EXAM CHEST 1 VIEW: CPT

## 2025-08-19 PROCEDURE — 31653 BRONCH EBUS SAMPLNG 3/> NODE: CPT | Performed by: INTERNAL MEDICINE

## 2025-08-19 PROCEDURE — 31628 BRONCHOSCOPY/LUNG BX EACH: CPT | Performed by: INTERNAL MEDICINE

## 2025-08-19 PROCEDURE — 31629 BRONCHOSCOPY/NEEDLE BX EACH: CPT | Performed by: INTERNAL MEDICINE

## 2025-08-19 PROCEDURE — 88173 CYTOPATH EVAL FNA REPORT: CPT | Performed by: PATHOLOGY

## 2025-08-19 PROCEDURE — 31654 BRONCH EBUS IVNTJ PERPH LES: CPT | Performed by: INTERNAL MEDICINE

## 2025-08-19 PROCEDURE — 88305 TISSUE EXAM BY PATHOLOGIST: CPT | Performed by: PATHOLOGY

## 2025-08-19 PROCEDURE — 31627 NAVIGATIONAL BRONCHOSCOPY: CPT | Performed by: INTERNAL MEDICINE

## 2025-08-19 PROCEDURE — 31624 DX BRONCHOSCOPE/LAVAGE: CPT | Performed by: INTERNAL MEDICINE

## 2025-08-19 PROCEDURE — 88341 IMHCHEM/IMCYTCHM EA ADD ANTB: CPT | Performed by: PATHOLOGY

## 2025-08-19 PROCEDURE — 88112 CYTOPATH CELL ENHANCE TECH: CPT | Performed by: PATHOLOGY

## 2025-08-19 PROCEDURE — 88342 IMHCHEM/IMCYTCHM 1ST ANTB: CPT | Performed by: PATHOLOGY

## 2025-08-19 PROCEDURE — 88333 PATH CONSLTJ SURG CYTO XM 1: CPT | Performed by: PATHOLOGY

## 2025-08-19 PROCEDURE — 88172 CYTP DX EVAL FNA 1ST EA SITE: CPT | Performed by: PATHOLOGY

## 2025-08-19 RX ORDER — METHYLPREDNISOLONE SODIUM SUCCINATE 125 MG/2ML
INJECTION, POWDER, LYOPHILIZED, FOR SOLUTION INTRAMUSCULAR; INTRAVENOUS AS NEEDED
Status: DISCONTINUED | OUTPATIENT
Start: 2025-08-19 | End: 2025-08-19

## 2025-08-19 RX ORDER — FENTANYL CITRATE 50 UG/ML
INJECTION, SOLUTION INTRAMUSCULAR; INTRAVENOUS AS NEEDED
Status: DISCONTINUED | OUTPATIENT
Start: 2025-08-19 | End: 2025-08-19

## 2025-08-19 RX ORDER — MIDAZOLAM HYDROCHLORIDE 2 MG/2ML
INJECTION, SOLUTION INTRAMUSCULAR; INTRAVENOUS AS NEEDED
Status: DISCONTINUED | OUTPATIENT
Start: 2025-08-19 | End: 2025-08-19

## 2025-08-19 RX ORDER — LEVALBUTEROL INHALATION SOLUTION 1.25 MG/3ML
1.25 SOLUTION RESPIRATORY (INHALATION) ONCE
Status: COMPLETED | OUTPATIENT
Start: 2025-08-19 | End: 2025-08-19

## 2025-08-19 RX ORDER — PROPOFOL 10 MG/ML
INJECTION, EMULSION INTRAVENOUS AS NEEDED
Status: DISCONTINUED | OUTPATIENT
Start: 2025-08-19 | End: 2025-08-19

## 2025-08-19 RX ORDER — LIDOCAINE HYDROCHLORIDE 10 MG/ML
INJECTION, SOLUTION EPIDURAL; INFILTRATION; INTRACAUDAL; PERINEURAL AS NEEDED
Status: DISCONTINUED | OUTPATIENT
Start: 2025-08-19 | End: 2025-08-19

## 2025-08-19 RX ORDER — SODIUM CHLORIDE, SODIUM LACTATE, POTASSIUM CHLORIDE, CALCIUM CHLORIDE 600; 310; 30; 20 MG/100ML; MG/100ML; MG/100ML; MG/100ML
20 INJECTION, SOLUTION INTRAVENOUS CONTINUOUS
Status: DISCONTINUED | OUTPATIENT
Start: 2025-08-19 | End: 2025-08-23 | Stop reason: HOSPADM

## 2025-08-19 RX ORDER — FENTANYL CITRATE/PF 50 MCG/ML
25 SYRINGE (ML) INJECTION
Status: DISCONTINUED | OUTPATIENT
Start: 2025-08-19 | End: 2025-08-19 | Stop reason: HOSPADM

## 2025-08-19 RX ORDER — METOCLOPRAMIDE HYDROCHLORIDE 5 MG/ML
10 INJECTION INTRAMUSCULAR; INTRAVENOUS ONCE AS NEEDED
Status: DISCONTINUED | OUTPATIENT
Start: 2025-08-19 | End: 2025-08-19 | Stop reason: HOSPADM

## 2025-08-19 RX ORDER — PREDNISONE 20 MG/1
40 TABLET ORAL DAILY
Qty: 10 TABLET | Refills: 0 | Status: SHIPPED | OUTPATIENT
Start: 2025-08-19

## 2025-08-19 RX ORDER — ROCURONIUM BROMIDE 10 MG/ML
INJECTION, SOLUTION INTRAVENOUS AS NEEDED
Status: DISCONTINUED | OUTPATIENT
Start: 2025-08-19 | End: 2025-08-19

## 2025-08-19 RX ORDER — ONDANSETRON 2 MG/ML
INJECTION INTRAMUSCULAR; INTRAVENOUS AS NEEDED
Status: DISCONTINUED | OUTPATIENT
Start: 2025-08-19 | End: 2025-08-19

## 2025-08-19 RX ORDER — EPHEDRINE SULFATE 50 MG/ML
INJECTION INTRAVENOUS AS NEEDED
Status: DISCONTINUED | OUTPATIENT
Start: 2025-08-19 | End: 2025-08-19

## 2025-08-19 RX ORDER — ALBUTEROL SULFATE 0.83 MG/ML
2.5 SOLUTION RESPIRATORY (INHALATION) ONCE AS NEEDED
Status: COMPLETED | OUTPATIENT
Start: 2025-08-19 | End: 2025-08-19

## 2025-08-19 RX ORDER — ONDANSETRON 2 MG/ML
4 INJECTION INTRAMUSCULAR; INTRAVENOUS ONCE AS NEEDED
Status: DISCONTINUED | OUTPATIENT
Start: 2025-08-19 | End: 2025-08-19 | Stop reason: HOSPADM

## 2025-08-19 RX ADMIN — EPHEDRINE SULFATE 5 MG: 50 INJECTION, SOLUTION INTRAVENOUS at 08:55

## 2025-08-19 RX ADMIN — ALBUTEROL SULFATE 2.5 MG: 2.5 SOLUTION RESPIRATORY (INHALATION) at 10:30

## 2025-08-19 RX ADMIN — FENTANYL CITRATE 25 MCG: 50 INJECTION INTRAMUSCULAR; INTRAVENOUS at 09:50

## 2025-08-19 RX ADMIN — FENTANYL CITRATE 25 MCG: 50 INJECTION INTRAMUSCULAR; INTRAVENOUS at 08:20

## 2025-08-19 RX ADMIN — FENTANYL CITRATE 50 MCG: 50 INJECTION INTRAMUSCULAR; INTRAVENOUS at 08:14

## 2025-08-19 RX ADMIN — MIDAZOLAM 2 MG: 1 INJECTION INTRAMUSCULAR; INTRAVENOUS at 08:07

## 2025-08-19 RX ADMIN — PHENYLEPHRINE HYDROCHLORIDE 100 MCG: 50 INJECTION INTRAVENOUS at 08:42

## 2025-08-19 RX ADMIN — EPHEDRINE SULFATE 10 MG: 50 INJECTION, SOLUTION INTRAVENOUS at 08:57

## 2025-08-19 RX ADMIN — LEVALBUTEROL HYDROCHLORIDE 1.25 MG: 1.25 SOLUTION RESPIRATORY (INHALATION) at 07:20

## 2025-08-19 RX ADMIN — SUGAMMADEX 200 MG: 100 INJECTION, SOLUTION INTRAVENOUS at 10:00

## 2025-08-19 RX ADMIN — LEVALBUTEROL HYDROCHLORIDE 1.25 MG: 1.25 SOLUTION RESPIRATORY (INHALATION) at 07:42

## 2025-08-19 RX ADMIN — PHENYLEPHRINE HYDROCHLORIDE 100 MCG: 50 INJECTION INTRAVENOUS at 08:45

## 2025-08-19 RX ADMIN — PROPOFOL 150 MG: 10 INJECTION, EMULSION INTRAVENOUS at 08:20

## 2025-08-19 RX ADMIN — PHENYLEPHRINE HYDROCHLORIDE 200 MCG: 50 INJECTION INTRAVENOUS at 08:51

## 2025-08-19 RX ADMIN — PHENYLEPHRINE HYDROCHLORIDE 100 MCG: 50 INJECTION INTRAVENOUS at 08:27

## 2025-08-19 RX ADMIN — ROCURONIUM BROMIDE 50 MG: 10 INJECTION, SOLUTION INTRAVENOUS at 08:21

## 2025-08-19 RX ADMIN — ONDANSETRON 4 MG: 2 INJECTION INTRAMUSCULAR; INTRAVENOUS at 09:50

## 2025-08-19 RX ADMIN — PHENYLEPHRINE HYDROCHLORIDE 200 MCG: 50 INJECTION INTRAVENOUS at 08:37

## 2025-08-19 RX ADMIN — PROPOFOL 150 MCG/KG/MIN: 10 INJECTION, EMULSION INTRAVENOUS at 08:24

## 2025-08-19 RX ADMIN — PHENYLEPHRINE HYDROCHLORIDE 200 MCG: 50 INJECTION INTRAVENOUS at 08:30

## 2025-08-19 RX ADMIN — LIDOCAINE HYDROCHLORIDE 50 MG: 10 INJECTION, SOLUTION EPIDURAL; INFILTRATION; INTRACAUDAL; PERINEURAL at 08:20

## 2025-08-19 RX ADMIN — METHYLPREDNISOLONE SODIUM SUCCINATE 125 MG: 125 INJECTION, POWDER, FOR SOLUTION INTRAMUSCULAR; INTRAVENOUS at 08:14

## 2025-08-19 RX ADMIN — SODIUM CHLORIDE, SODIUM LACTATE, POTASSIUM CHLORIDE, AND CALCIUM CHLORIDE: .6; .31; .03; .02 INJECTION, SOLUTION INTRAVENOUS at 09:29

## 2025-08-19 RX ADMIN — PHENYLEPHRINE HYDROCHLORIDE 100 MCG: 50 INJECTION INTRAVENOUS at 08:49

## 2025-08-19 RX ADMIN — EPHEDRINE SULFATE 10 MG: 50 INJECTION, SOLUTION INTRAVENOUS at 09:07

## 2025-08-19 RX ADMIN — ROCURONIUM BROMIDE 20 MG: 10 INJECTION, SOLUTION INTRAVENOUS at 08:50

## 2025-08-19 RX ADMIN — ROCURONIUM BROMIDE 30 MG: 10 INJECTION, SOLUTION INTRAVENOUS at 09:08

## 2025-08-19 RX ADMIN — SODIUM CHLORIDE, SODIUM LACTATE, POTASSIUM CHLORIDE, AND CALCIUM CHLORIDE: .6; .31; .03; .02 INJECTION, SOLUTION INTRAVENOUS at 08:07

## 2025-08-21 ENCOUNTER — RESULTS FOLLOW-UP (OUTPATIENT)
Dept: PULMONOLOGY | Facility: CLINIC | Age: 65
End: 2025-08-21

## 2025-08-21 DIAGNOSIS — R91.1 PULMONARY NODULE: Primary | ICD-10-CM

## 2025-08-22 ENCOUNTER — DOCUMENTATION (OUTPATIENT)
Dept: HEMATOLOGY ONCOLOGY | Facility: CLINIC | Age: 65
End: 2025-08-22

## (undated) DEVICE — SCD SEQUENTIAL COMPRESSION COMFORT SLEEVE MEDIUM KNEE LENGTH: Brand: KENDALL SCD

## (undated) DEVICE — ANTI-FOG SOLUTION WITH FOAM PAD: Brand: DEVON

## (undated) DEVICE — GLOVE SRG BIOGEL 7.5

## (undated) DEVICE — TELFA NON-ADHERENT ABSORBENT DRESSING: Brand: TELFA

## (undated) DEVICE — STERILIZATION TEETH GUARDS

## (undated) DEVICE — BETHLEHEM UNIVERSAL OUTPATIENT: Brand: CARDINAL HEALTH

## (undated) DEVICE — NEEDLE 18 G X 1 1/2 SAFETY

## (undated) DEVICE — SPECIMEN CONTAINER STERILE PEEL PACK

## (undated) DEVICE — TUBING SUCTION 5MM X 12 FT

## (undated) DEVICE — SYRINGE 3ML LL

## (undated) DEVICE — SYRINGE 10ML LL CONTROL TOP

## (undated) DEVICE — NEURO PATTIES 1/2 X 1 1/2